# Patient Record
Sex: FEMALE | Race: WHITE | NOT HISPANIC OR LATINO | Employment: PART TIME | URBAN - METROPOLITAN AREA
[De-identification: names, ages, dates, MRNs, and addresses within clinical notes are randomized per-mention and may not be internally consistent; named-entity substitution may affect disease eponyms.]

---

## 2017-01-18 ENCOUNTER — APPOINTMENT (OUTPATIENT)
Dept: LAB | Facility: MEDICAL CENTER | Age: 37
End: 2017-01-18
Attending: NURSE PRACTITIONER
Payer: MEDICAID

## 2017-01-23 ENCOUNTER — HOSPITAL ENCOUNTER (OUTPATIENT)
Dept: LAB | Facility: MEDICAL CENTER | Age: 37
End: 2017-01-23
Attending: NURSE PRACTITIONER
Payer: MEDICAID

## 2017-01-23 LAB
ALBUMIN SERPL BCP-MCNC: 4.3 G/DL (ref 3.2–4.9)
ALBUMIN/GLOB SERPL: 1.3 G/DL
ALP SERPL-CCNC: 44 U/L (ref 30–99)
ALT SERPL-CCNC: 9 U/L (ref 2–50)
ANION GAP SERPL CALC-SCNC: 6 MMOL/L (ref 0–11.9)
AST SERPL-CCNC: 12 U/L (ref 12–45)
BASOPHILS # BLD AUTO: 0.03 K/UL (ref 0–0.12)
BASOPHILS NFR BLD AUTO: 0.6 % (ref 0–1.8)
BILIRUB SERPL-MCNC: 0.3 MG/DL (ref 0.1–1.5)
BUN SERPL-MCNC: 10 MG/DL (ref 8–22)
CALCIUM SERPL-MCNC: 9.1 MG/DL (ref 8.5–10.5)
CHLORIDE SERPL-SCNC: 107 MMOL/L (ref 96–112)
CHOLEST SERPL-MCNC: 222 MG/DL (ref 100–199)
CO2 SERPL-SCNC: 22 MMOL/L (ref 20–33)
CREAT SERPL-MCNC: 0.82 MG/DL (ref 0.5–1.4)
EOSINOPHIL # BLD: 0.07 K/UL (ref 0–0.51)
EOSINOPHIL NFR BLD AUTO: 1.3 % (ref 0–6.9)
ERYTHROCYTE [DISTWIDTH] IN BLOOD BY AUTOMATED COUNT: 44.8 FL (ref 35.9–50)
GLOBULIN SER CALC-MCNC: 3.2 G/DL (ref 1.9–3.5)
GLUCOSE SERPL-MCNC: 78 MG/DL (ref 65–99)
HCT VFR BLD AUTO: 40 % (ref 37–47)
HDLC SERPL-MCNC: 56 MG/DL
HGB BLD-MCNC: 12.7 G/DL (ref 12–16)
IMM GRANULOCYTES # BLD AUTO: 0.02 K/UL (ref 0–0.11)
IMM GRANULOCYTES NFR BLD AUTO: 0.4 % (ref 0–0.9)
LDLC SERPL CALC-MCNC: 127 MG/DL
LYMPHOCYTES # BLD: 1.78 K/UL (ref 1–4.8)
LYMPHOCYTES NFR BLD AUTO: 33 % (ref 22–41)
MCH RBC QN AUTO: 28.9 PG (ref 27–33)
MCHC RBC AUTO-ENTMCNC: 31.8 G/DL (ref 33.6–35)
MCV RBC AUTO: 90.9 FL (ref 81.4–97.8)
MONOCYTES # BLD: 0.57 K/UL (ref 0–0.85)
MONOCYTES NFR BLD AUTO: 10.6 % (ref 0–13.4)
NEUTROPHILS # BLD: 2.93 K/UL (ref 2–7.15)
NEUTROPHILS NFR BLD AUTO: 54.1 % (ref 44–72)
NRBC # BLD AUTO: 0 K/UL
NRBC BLD-RTO: 0 /100 WBC
PLATELET # BLD AUTO: 254 K/UL (ref 164–446)
PMV BLD AUTO: 10.8 FL (ref 9–12.9)
POTASSIUM SERPL-SCNC: 4.2 MMOL/L (ref 3.6–5.5)
PROT SERPL-MCNC: 7.5 G/DL (ref 6–8.2)
RBC # BLD AUTO: 4.4 M/UL (ref 4.2–5.4)
SODIUM SERPL-SCNC: 135 MMOL/L (ref 135–145)
T4 FREE SERPL-MCNC: 0.69 NG/DL (ref 0.53–1.43)
TRIGL SERPL-MCNC: 194 MG/DL (ref 0–149)
TSH SERPL DL<=0.005 MIU/L-ACNC: 1.43 UIU/ML (ref 0.3–3.7)
WBC # BLD AUTO: 5.4 K/UL (ref 4.8–10.8)

## 2017-01-23 PROCEDURE — 36415 COLL VENOUS BLD VENIPUNCTURE: CPT

## 2017-01-23 PROCEDURE — 80053 COMPREHEN METABOLIC PANEL: CPT

## 2017-01-23 PROCEDURE — 84443 ASSAY THYROID STIM HORMONE: CPT

## 2017-01-23 PROCEDURE — 85025 COMPLETE CBC W/AUTO DIFF WBC: CPT

## 2017-01-23 PROCEDURE — 80061 LIPID PANEL: CPT

## 2017-01-23 PROCEDURE — 84439 ASSAY OF FREE THYROXINE: CPT

## 2017-04-21 ENCOUNTER — HOSPITAL ENCOUNTER (OUTPATIENT)
Dept: LAB | Facility: MEDICAL CENTER | Age: 37
End: 2017-04-21
Payer: MEDICAID

## 2017-04-21 LAB
HBV SURFACE AG SER QL: NEGATIVE
HCV AB SER QL: NEGATIVE
HIV 1+2 AB+HIV1 P24 AG SERPL QL IA: NON REACTIVE
TREPONEMA PALLIDUM IGG+IGM AB [PRESENCE] IN SERUM OR PLASMA BY IMMUNOASSAY: NON REACTIVE

## 2017-04-21 PROCEDURE — 86803 HEPATITIS C AB TEST: CPT

## 2017-04-21 PROCEDURE — 87389 HIV-1 AG W/HIV-1&-2 AB AG IA: CPT

## 2017-04-21 PROCEDURE — 87340 HEPATITIS B SURFACE AG IA: CPT

## 2017-04-21 PROCEDURE — 86780 TREPONEMA PALLIDUM: CPT

## 2017-04-21 PROCEDURE — 86694 HERPES SIMPLEX NES ANTBDY: CPT | Mod: 91

## 2017-04-21 PROCEDURE — 36415 COLL VENOUS BLD VENIPUNCTURE: CPT

## 2017-04-24 LAB
HSV1+2 IGG SER IA-ACNC: >22.4 IV
HSV1+2 IGM SER IA-ACNC: 0.96 IV

## 2017-05-09 ENCOUNTER — HOSPITAL ENCOUNTER (EMERGENCY)
Facility: MEDICAL CENTER | Age: 37
End: 2017-05-09
Attending: EMERGENCY MEDICINE
Payer: MEDICAID

## 2017-05-09 VITALS
OXYGEN SATURATION: 99 % | BODY MASS INDEX: 19.56 KG/M2 | DIASTOLIC BLOOD PRESSURE: 67 MMHG | HEIGHT: 60 IN | SYSTOLIC BLOOD PRESSURE: 106 MMHG | RESPIRATION RATE: 18 BRPM | WEIGHT: 99.65 LBS | TEMPERATURE: 97.9 F | HEART RATE: 75 BPM

## 2017-05-09 DIAGNOSIS — N39.0 URINARY TRACT INFECTION WITH HEMATURIA, SITE UNSPECIFIED: ICD-10-CM

## 2017-05-09 DIAGNOSIS — R31.9 URINARY TRACT INFECTION WITH HEMATURIA, SITE UNSPECIFIED: ICD-10-CM

## 2017-05-09 LAB
ALBUMIN SERPL BCP-MCNC: 4.3 G/DL (ref 3.2–4.9)
ALBUMIN/GLOB SERPL: 1.7 G/DL
ALP SERPL-CCNC: 45 U/L (ref 30–99)
ALT SERPL-CCNC: 5 U/L (ref 2–50)
ANION GAP SERPL CALC-SCNC: 8 MMOL/L (ref 0–11.9)
APPEARANCE UR: CLEAR
AST SERPL-CCNC: 9 U/L (ref 12–45)
BASOPHILS # BLD AUTO: 0.5 % (ref 0–1.8)
BASOPHILS # BLD: 0.04 K/UL (ref 0–0.12)
BILIRUB SERPL-MCNC: 0.2 MG/DL (ref 0.1–1.5)
BUN SERPL-MCNC: 18 MG/DL (ref 8–22)
CALCIUM SERPL-MCNC: 9.4 MG/DL (ref 8.5–10.5)
CHLORIDE SERPL-SCNC: 105 MMOL/L (ref 96–112)
CO2 SERPL-SCNC: 23 MMOL/L (ref 20–33)
COLOR UR AUTO: YELLOW
CREAT SERPL-MCNC: 0.65 MG/DL (ref 0.5–1.4)
EOSINOPHIL # BLD AUTO: 0.07 K/UL (ref 0–0.51)
EOSINOPHIL NFR BLD: 0.9 % (ref 0–6.9)
ERYTHROCYTE [DISTWIDTH] IN BLOOD BY AUTOMATED COUNT: 43.7 FL (ref 35.9–50)
GFR SERPL CREATININE-BSD FRML MDRD: >60 ML/MIN/1.73 M 2
GLOBULIN SER CALC-MCNC: 2.6 G/DL (ref 1.9–3.5)
GLUCOSE SERPL-MCNC: 91 MG/DL (ref 65–99)
GLUCOSE UR QL STRIP.AUTO: 250 MG/DL
HCG SERPL QL: NEGATIVE
HCT VFR BLD AUTO: 38.4 % (ref 37–47)
HGB BLD-MCNC: 12.7 G/DL (ref 12–16)
IMM GRANULOCYTES # BLD AUTO: 0.02 K/UL (ref 0–0.11)
IMM GRANULOCYTES NFR BLD AUTO: 0.2 % (ref 0–0.9)
KETONES UR QL STRIP.AUTO: >=160 MG/DL
LEUKOCYTE ESTERASE UR QL STRIP.AUTO: ABNORMAL
LYMPHOCYTES # BLD AUTO: 2.07 K/UL (ref 1–4.8)
LYMPHOCYTES NFR BLD: 25.6 % (ref 22–41)
MCH RBC QN AUTO: 28.5 PG (ref 27–33)
MCHC RBC AUTO-ENTMCNC: 33.1 G/DL (ref 33.6–35)
MCV RBC AUTO: 86.3 FL (ref 81.4–97.8)
MONOCYTES # BLD AUTO: 0.3 K/UL (ref 0–0.85)
MONOCYTES NFR BLD AUTO: 3.7 % (ref 0–13.4)
NEUTROPHILS # BLD AUTO: 5.58 K/UL (ref 2–7.15)
NEUTROPHILS NFR BLD: 69.1 % (ref 44–72)
NITRITE UR QL STRIP.AUTO: POSITIVE
NRBC # BLD AUTO: 0 K/UL
NRBC BLD AUTO-RTO: 0 /100 WBC
PH UR STRIP.AUTO: 5 [PH]
PLATELET # BLD AUTO: 330 K/UL (ref 164–446)
PMV BLD AUTO: 9.9 FL (ref 9–12.9)
POTASSIUM SERPL-SCNC: 3 MMOL/L (ref 3.6–5.5)
PROT SERPL-MCNC: 6.9 G/DL (ref 6–8.2)
PROT UR QL STRIP: >=300 MG/DL
RBC # BLD AUTO: 4.45 M/UL (ref 4.2–5.4)
RBC UR QL AUTO: ABNORMAL
SODIUM SERPL-SCNC: 136 MMOL/L (ref 135–145)
SP GR UR: 1.02
WBC # BLD AUTO: 8.1 K/UL (ref 4.8–10.8)

## 2017-05-09 PROCEDURE — 700102 HCHG RX REV CODE 250 W/ 637 OVERRIDE(OP): Performed by: EMERGENCY MEDICINE

## 2017-05-09 PROCEDURE — 96365 THER/PROPH/DIAG IV INF INIT: CPT

## 2017-05-09 PROCEDURE — 84703 CHORIONIC GONADOTROPIN ASSAY: CPT

## 2017-05-09 PROCEDURE — 700105 HCHG RX REV CODE 258: Performed by: EMERGENCY MEDICINE

## 2017-05-09 PROCEDURE — 85025 COMPLETE CBC W/AUTO DIFF WBC: CPT

## 2017-05-09 PROCEDURE — 700111 HCHG RX REV CODE 636 W/ 250 OVERRIDE (IP): Performed by: EMERGENCY MEDICINE

## 2017-05-09 PROCEDURE — 36415 COLL VENOUS BLD VENIPUNCTURE: CPT

## 2017-05-09 PROCEDURE — 87086 URINE CULTURE/COLONY COUNT: CPT

## 2017-05-09 PROCEDURE — 81002 URINALYSIS NONAUTO W/O SCOPE: CPT

## 2017-05-09 PROCEDURE — 80053 COMPREHEN METABOLIC PANEL: CPT

## 2017-05-09 PROCEDURE — A9270 NON-COVERED ITEM OR SERVICE: HCPCS | Performed by: EMERGENCY MEDICINE

## 2017-05-09 PROCEDURE — 99284 EMERGENCY DEPT VISIT MOD MDM: CPT

## 2017-05-09 RX ORDER — OXYCODONE HYDROCHLORIDE AND ACETAMINOPHEN 5; 325 MG/1; MG/1
1 TABLET ORAL ONCE
Status: COMPLETED | OUTPATIENT
Start: 2017-05-09 | End: 2017-05-09

## 2017-05-09 RX ORDER — ONDANSETRON 4 MG/1
4 TABLET, FILM COATED ORAL EVERY 4 HOURS PRN
Qty: 20 TAB | Refills: 0 | Status: SHIPPED | OUTPATIENT
Start: 2017-05-09 | End: 2017-08-03

## 2017-05-09 RX ORDER — SULFAMETHOXAZOLE AND TRIMETHOPRIM 800; 160 MG/1; MG/1
1 TABLET ORAL 2 TIMES DAILY
Qty: 6 TAB | Refills: 0 | Status: SHIPPED | OUTPATIENT
Start: 2017-05-09 | End: 2017-05-12

## 2017-05-09 RX ORDER — CEFTRIAXONE 1 G/1
1 INJECTION, POWDER, FOR SOLUTION INTRAMUSCULAR; INTRAVENOUS ONCE
Status: COMPLETED | OUTPATIENT
Start: 2017-05-09 | End: 2017-05-09

## 2017-05-09 RX ORDER — ONDANSETRON 4 MG/1
4 TABLET, ORALLY DISINTEGRATING ORAL ONCE
Status: COMPLETED | OUTPATIENT
Start: 2017-05-09 | End: 2017-05-09

## 2017-05-09 RX ORDER — SODIUM CHLORIDE 9 MG/ML
1000 INJECTION, SOLUTION INTRAVENOUS ONCE
Status: COMPLETED | OUTPATIENT
Start: 2017-05-09 | End: 2017-05-09

## 2017-05-09 RX ORDER — OXYCODONE HYDROCHLORIDE AND ACETAMINOPHEN 5; 325 MG/1; MG/1
1-2 TABLET ORAL EVERY 4 HOURS PRN
Qty: 20 TAB | Refills: 0 | Status: SHIPPED | OUTPATIENT
Start: 2017-05-09 | End: 2017-08-03

## 2017-05-09 RX ADMIN — ONDANSETRON 4 MG: 4 TABLET, ORALLY DISINTEGRATING ORAL at 13:57

## 2017-05-09 RX ADMIN — OXYCODONE HYDROCHLORIDE AND ACETAMINOPHEN 1 TABLET: 5; 325 TABLET ORAL at 13:57

## 2017-05-09 RX ADMIN — CEFTRIAXONE 1 G: 1 INJECTION, POWDER, FOR SOLUTION INTRAMUSCULAR; INTRAVENOUS at 14:19

## 2017-05-09 RX ADMIN — SODIUM CHLORIDE 1000 ML: 9 INJECTION, SOLUTION INTRAVENOUS at 14:19

## 2017-05-09 NOTE — ED AVS SNAPSHOT
5/9/2017    Danielle Bacrenas  [address]  [place] [address]    Dear Danielle:    [facility] wants to ensure your discharge home is safe and you or your loved ones have had all of your questions answered regarding your care after you leave the hospital.    Below is a list of resources and contact information should you have any questions regarding your hospital stay, follow-up instructions, or active medical symptoms.    Questions or Concerns Regarding… Contact   Medical Questions Related to Your Discharge  (7 days a week, 8am-5pm) Contact a Nurse Care Coordinator   [phone]   Medical Questions Not Related to Your Discharge  (24 hours a day / 7 days a week)  Contact the Nurse Health Line   [phone]    Medications or Discharge Instructions Refer to your discharge packet   or contact your [facility] Primary Care Provider   [phone]   Follow-up Appointment(s) Schedule your appointment via Corebook   or contact Scheduling [phone]   Billing Review your statement via Corebook  or contact Billing [phone]   Medical Records Review your records via Corebook   or contact Medical Records [phone]     You may receive a telephone call within two days of discharge. This call is to make certain you understand your discharge instructions and have the opportunity to have any questions answered. You can also easily access your medical information, test results and upcoming appointments via the Corebook free online health management tool. You can learn more and sign up at [employer]/Corebook. For assistance setting up your Corebook account, please call [phone].    Once again, we want to ensure your discharge home is safe and that you have a clear understanding of any next steps in your care. If you have any questions or concerns, please do not hesitate to contact us, we are here for you. Thank you for choosing [facility] for your healthcare needs.    Sincerely,    Your [facility] Healthcare Team

## 2017-05-09 NOTE — ED AVS SNAPSHOT
Ungalli Access Code: [email or web address]  Expires: 6/8/2017  3:31 PM    Ungalli  A secure, online tool to manage your health information     [employer] Ungalli® is a secure, online tool that connects you to your personalized health information from the privacy of your home -- day or night - making it very easy for you to manage your healthcare. Once the activation process is completed, you can even access your medical information using the Ungalli john, which is available for free in the Apple John store or Google Play store.     Ungalli provides the following levels of access (as shown below):   My Chart Features   [facility] Primary Care Doctor [facility]  Specialists [facility]  Urgent  Care Non-[facility]  Primary Care  Doctor   Email your healthcare team securely and privately 24/7 X X X X   Manage appointments: schedule your next appointment; view details of past/upcoming appointments X      Request prescription refills. X      View recent personal medical records, including lab and immunizations X X X X   View health record, including health history, allergies, medications X X X X   Read reports about your outpatient visits, procedures, consult and ER notes X X X X   See your discharge summary, which is a recap of your hospital and/or ER visit that includes your diagnosis, lab results, and care plan. X X       How to register for Ungalli:  1. Go to  [email or web address].  2. Click on the Sign Up Now box, which takes you to the New Member Sign Up page. You will need to provide the following information:  a. Enter your Ungalli Access Code exactly as it appears at the top of this page. (You will not need to use this code after you’ve completed the sign-up process. If you do not sign up before the expiration date, you must request a new code.)   b. Enter your date of birth.   c. Enter your home email address.   d. Click Submit, and follow the next screen’s instructions.  3. Create a Ungalli ID. This will be your Ungalli  login ID and cannot be changed, so think of one that is secure and easy to remember.  4. Create a [employer] password. You can change your password at any time.  5. Enter your Password Reset Question and Answer. This can be used at a later time if you forget your password.   6. Enter your e-mail address. This allows you to receive e-mail notifications when new information is available in [employer].  7. Click Sign Up. You can now view your health information.    For assistance activating your [employer] account, call [phone]

## 2017-05-09 NOTE — DISCHARGE INSTRUCTIONS
Urinary Tract Infection  Urinary tract infections (UTIs) can develop anywhere along your urinary tract. Your urinary tract is your body's drainage system for removing wastes and extra water. Your urinary tract includes two kidneys, two ureters, a bladder, and a urethra. Your kidneys are a pair of bean-shaped organs. Each kidney is about the size of your fist. They are located below your ribs, one on each side of your spine.  CAUSES  Infections are caused by microbes, which are microscopic organisms, including fungi, viruses, and bacteria. These organisms are so small that they can only be seen through a microscope. Bacteria are the microbes that most commonly cause UTIs.  SYMPTOMS   Symptoms of UTIs may vary by age and gender of the patient and by the location of the infection. Symptoms in young women typically include a frequent and intense urge to urinate and a painful, burning feeling in the bladder or urethra during urination. Older women and men are more likely to be tired, shaky, and weak and have muscle aches and abdominal pain. A fever may mean the infection is in your kidneys. Other symptoms of a kidney infection include pain in your back or sides below the ribs, nausea, and vomiting.  DIAGNOSIS  To diagnose a UTI, your caregiver will ask you about your symptoms. Your caregiver also will ask to provide a urine sample. The urine sample will be tested for bacteria and white blood cells. White blood cells are made by your body to help fight infection.  TREATMENT   Typically, UTIs can be treated with medication. Because most UTIs are caused by a bacterial infection, they usually can be treated with the use of antibiotics. The choice of antibiotic and length of treatment depend on your symptoms and the type of bacteria causing your infection.  HOME CARE INSTRUCTIONS  · If you were prescribed antibiotics, take them exactly as your caregiver instructs you. Finish the medication even if you feel better after you  have only taken some of the medication.  · Drink enough water and fluids to keep your urine clear or pale yellow.  · Avoid caffeine, tea, and carbonated beverages. They tend to irritate your bladder.  · Empty your bladder often. Avoid holding urine for long periods of time.  · Empty your bladder before and after sexual intercourse.  · After a bowel movement, women should cleanse from front to back. Use each tissue only once.  SEEK MEDICAL CARE IF:   · You have back pain.  · You develop a fever.  · Your symptoms do not begin to resolve within 3 days.  SEEK IMMEDIATE MEDICAL CARE IF:   · You have severe back pain or lower abdominal pain.  · You develop chills.  · You have nausea or vomiting.  · You have continued burning or discomfort with urination.  MAKE SURE YOU:   · Understand these instructions.  · Will watch your condition.  · Will get help right away if you are not doing well or get worse.     This information is not intended to replace advice given to you by your health care provider. Make sure you discuss any questions you have with your health care provider.     Document Released: 09/27/2006 Document Revised: 01/08/2016 Document Reviewed: 01/25/2013  [employer] Interactive Patient Education ©2016 [employer]

## 2017-05-09 NOTE — ED NOTES
"Pt to triage, c/o \" painful urination \" \" hurts only when I urinate \" , pt states \" I think I have a UIT\" , \" tried OTC azos\" , pt provided urine cup and clean catch instructions in triage   "

## 2017-05-09 NOTE — ED AVS SNAPSHOT
Home Care Instructions                                                                                                                Danielle Barcenas   MRN: [id number]    Department:  [facility], Emergency Dept   Date of Visit:  5/9/2017            [facility], Emergency Dept    [address] [place] [id number]    Phone:  [phone]      You were seen by     Paulo Cornell M.D.      Your Diagnosis Was     Urinary tract infection with hematuria, site unspecified     N39.0, R31.9       These are the medications you received during your hospitalization from 05/09/2017 1237 to 05/09/2017 1532     Date/Time Order Dose Route Action    05/09/2017 1357 oxycodone-acetaminophen (PERCOCET) 5-325 MG per tablet 1 Tab 1 Tab Oral Given    05/09/2017 1357 ondansetron (ZOFRAN ODT) dispertab 4 mg 4 mg Oral Given    05/09/2017 1419 NS infusion 1,000 mL 1,000 mL Intravenous New Bag    05/09/2017 1419 cefTRIAXone (ROCEPHIN) injection 1 g 1 g Intravenous Given      Follow-up Information     1. Follow up with [facility].    Why:  call for follow up to establish a primary care doctor    Contact information    [address] [place] [address] [phone]        2. Follow up with [facility], Emergency Dept.    Specialty:  Emergency Medicine    Why:  If symptoms worsen    Contact information    [address] [place] [id number] [phone]      Medication Information     Review all of your home medications and newly ordered medications with your primary doctor and/or pharmacist as soon as possible. Follow medication instructions as directed by your doctor and/or pharmacist.     Please keep your complete medication list with you and share with your physician. Update the information when medications are discontinued, doses are changed, or new medications (including over-the-counter products) are added; and carry medication information at all  times in the event of emergency situations.               Medication List      START taking these medications        Instructions    Morning Afternoon Evening Bedtime    ondansetron 4 MG Tabs tablet   Commonly known as:  ZOFRAN        Take 1 Tab by mouth every four hours as needed for Nausea/Vomiting.   Dose:  4 mg                        sulfamethoxazole-trimethoprim 800-160 MG tablet   Commonly known as:  BACTRIM DS        Take 1 Tab by mouth 2 times a day for 3 days.   Dose:  1 Tab                          ASK your doctor about these medications        Instructions    Morning Afternoon Evening Bedtime    alprazolam 0.5 MG Tabs   Commonly known as:  XANAX        Take 1 Tab by mouth at bedtime as needed for Sleep.   Dose:  0.5 mg                        cyclobenzaprine 10 MG Tabs   Commonly known as:  FLEXERIL        Take 1 Tab by mouth 3 times a day as needed for Moderate Pain.   Dose:  10 mg                        escitalopram 10 MG Tabs   Commonly known as:  LEXAPRO        Take 1 Tab by mouth every day.   Dose:  10 mg                        * NUVARING VA        Insert  in vagina.                        * ethinyl estradiol-etonogestrel 0.12-0.015 MG/24HR vaginal ring   Commonly known as:  NUVARING        Use as directed                        hydrALAZINE 10 MG Tabs   Commonly known as:  APRESOLINE        Take 10 mg by mouth 3 times a day.   Dose:  10 mg                        * hydrocodone-acetaminophen 5-325 MG Tabs per tablet   Commonly known as:  NORCO        Take 1-2 Tabs by mouth every four hours as needed.   Dose:  1-2 Tab                        * hydrocodone-acetaminophen 5-325 MG Tabs per tablet   Commonly known as:  NORCO        Take 1-2 Tabs by mouth every 6 hours as needed.   Dose:  1-2 Tab                        * hydrocodone/acetaminophen  MG Tabs   Commonly known as:  NORCO        Doctor's comments:  For new neck injuryTo last 30 days, no refills   Take 1 Tab by mouth every 8 hours as needed.      Dose:  1 Tab                        * hydrocodone-acetaminophen 5-325 MG Tabs per tablet   Commonly known as:  NORCO        Take 1 Tab by mouth every four hours as needed.   Dose:  1 Tab                        * hydrocodone/acetaminophen  MG Tabs   Commonly known as:  NORCO        Doctor's comments:  No refills.   Take 1 Tab by mouth every 24 hours as needed for Moderate Pain or Severe Pain.   Dose:  1 Tab                        hydrOXYzine 25 MG Tabs   Commonly known as:  ATARAX        Take 1 Tab by mouth at bedtime as needed for Anxiety (sleep).   Dose:  25 mg                        * ibuprofen 800 MG Tabs   Commonly known as:  MOTRIN        Take 0.5 Tabs by mouth every 8 hours as needed.   Dose:  400 mg                        * ibuprofen 800 MG Tabs   Commonly known as:  MOTRIN        Take 1 Tab by mouth every 8 hours as needed.   Dose:  800 mg                        lorazepam 1 MG Tabs   Commonly known as:  ATIVAN        Take 1 mg by mouth every four hours as needed for Anxiety.   Dose:  1 mg                        methocarbamol 500 MG Tabs   Commonly known as:  ROBAXIN        Take 1 Tab by mouth 3 times a day.   Dose:  500 mg                        * oxycodone-acetaminophen 5-325 MG Tabs   What changed:  Another medication with the same name was added. Make sure you understand how and when to take each.   Last time this was given:  1 Tab on 5/9/2017  1:57 PM   Commonly known as:  PERCOCET   Ask about: Which instructions should I use?        Take 1-2 Tabs by mouth every four hours as needed.   Dose:  1-2 Tab                        * oxycodone-acetaminophen 5-325 MG Tabs   What changed:  You were already taking a medication with the same name, and this prescription was added. Make sure you understand how and when to take each.   Last time this was given:  1 Tab on 5/9/2017  1:57 PM   Commonly known as:  PERCOCET   Ask about: Which instructions should I use?        Take 1-2 Tabs by mouth every four hours  as needed.   Dose:  1-2 Tab                        paroxetine 20 MG Tabs   Commonly known as:  PAXIL        Take 20 mg by mouth every day.   Dose:  20 mg                        propranolol 20 MG Tabs   Commonly known as:  INDERAL        Take 20 mg by mouth 2 times a day.   Dose:  20 mg                        TOPAMAX 50 MG tablet   Generic drug:  topiramate        Take 50 mg by mouth every day.   Dose:  50 mg                        tramadol 50 MG Tabs   Commonly known as:  ULTRAM        Take 1 Tab by mouth every four hours as needed.   Dose:  50 mg                        * Notice:  This list has 11 medication(s) that are the same as other medications prescribed for you. Read the directions carefully, and ask your doctor or other care provider to review them with you.         Where to Get Your Medications      You can get these medications from any pharmacy     Bring a paper prescription for each of these medications    - ondansetron 4 MG Tabs tablet  - oxycodone-acetaminophen 5-325 MG Tabs  - sulfamethoxazole-trimethoprim 800-160 MG tablet            Procedures and tests performed during your visit     CBC WITH DIFFERENTIAL    COMP METABOLIC PANEL    ESTIMATED GFR    HCG QUAL SERUM    IV Saline Lock    POC UA    URINE CULTURE(NEW)        Discharge Instructions       Urinary Tract Infection  Urinary tract infections (UTIs) can develop anywhere along your urinary tract. Your urinary tract is your body's drainage system for removing wastes and extra water. Your urinary tract includes two kidneys, two ureters, a bladder, and a urethra. Your kidneys are a pair of bean-shaped organs. Each kidney is about the size of your fist. They are located below your ribs, one on each side of your spine.  CAUSES  Infections are caused by microbes, which are microscopic organisms, including fungi, viruses, and bacteria. These organisms are so small that they can only be seen through a microscope. Bacteria are the microbes that most  commonly cause UTIs.  SYMPTOMS   Symptoms of UTIs may vary by age and gender of the patient and by the location of the infection. Symptoms in young women typically include a frequent and intense urge to urinate and a painful, burning feeling in the bladder or urethra during urination. Older women and men are more likely to be tired, shaky, and weak and have muscle aches and abdominal pain. A fever may mean the infection is in your kidneys. Other symptoms of a kidney infection include pain in your back or sides below the ribs, nausea, and vomiting.  DIAGNOSIS  To diagnose a UTI, your caregiver will ask you about your symptoms. Your caregiver also will ask to provide a urine sample. The urine sample will be tested for bacteria and white blood cells. White blood cells are made by your body to help fight infection.  TREATMENT   Typically, UTIs can be treated with medication. Because most UTIs are caused by a bacterial infection, they usually can be treated with the use of antibiotics. The choice of antibiotic and length of treatment depend on your symptoms and the type of bacteria causing your infection.  HOME CARE INSTRUCTIONS  · If you were prescribed antibiotics, take them exactly as your caregiver instructs you. Finish the medication even if you feel better after you have only taken some of the medication.  · Drink enough water and fluids to keep your urine clear or pale yellow.  · Avoid caffeine, tea, and carbonated beverages. They tend to irritate your bladder.  · Empty your bladder often. Avoid holding urine for long periods of time.  · Empty your bladder before and after sexual intercourse.  · After a bowel movement, women should cleanse from front to back. Use each tissue only once.  SEEK MEDICAL CARE IF:   · You have back pain.  · You develop a fever.  · Your symptoms do not begin to resolve within 3 days.  SEEK IMMEDIATE MEDICAL CARE IF:   · You have severe back pain or lower abdominal pain.  · You develop  chills.  · You have nausea or vomiting.  · You have continued burning or discomfort with urination.  MAKE SURE YOU:   · Understand these instructions.  · Will watch your condition.  · Will get help right away if you are not doing well or get worse.     This information is not intended to replace advice given to you by your health care provider. Make sure you discuss any questions you have with your health care provider.     Document Released: 09/27/2006 Document Revised: 01/08/2016 Document Reviewed: 01/25/2013  Elsevier Interactive Patient Education ©2016 [employer]            Patient Information     Patient Information    Following emergency treatment: all patient requiring follow-up care must return either to a private physician or a clinic if your condition worsens before you are able to obtain further medical attention, please return to the emergency room.     Billing Information    At [facility], we work to make the billing process streamlined for our patients.  Our Representatives are here to answer any questions you may have regarding your hospital bill.  If you have insurance coverage and have supplied your insurance information to us, we will submit a claim to your insurer on your behalf.  Should you have any questions regarding your bill, we can be reached online or by phone as follows:  Online: You are able pay your bills online or live chat with our representatives about any billing questions you may have. We are here to help Monday - Friday from 8:00am to 7:30pm and 9:00am - 12:00pm on Saturdays.  Please visit [email or web address]  for more information.   Phone:  [phone] or [phone]    Please note that your emergency physician, surgeon, pathologist, radiologist, anesthesiologist, and other specialists are not employed by [facility] and will therefore bill separately for their services.  Please contact them directly for any questions concerning their bills  at the numbers below:     Emergency Physician Services:  [phone]  [facility]:  [phone]  [facility]:  [phone]  [facility]:  [phone]    1. Your final bill may vary from the amount quoted upon discharge if all procedures are not complete at that time, or if your doctor has additional procedures of which we are not aware. You will receive an additional bill if you return to the Emergency Department at [facility] for suture removal regardless of the facility of which the sutures were placed.     2. Please arrange for settlement of this account at the emergency registration.    3. All self-pay accounts are due in full at the time of treatment.  If you are unable to meet this obligation then payment is expected within 4-5 days.     4. If you have had radiology studies (CT, X-ray, Ultrasound, MRI), you have received a preliminary result during your emergency department visit. Please contact the radiology department [phone] to receive a copy of your final result. Please discuss the Final result with your primary physician or with the follow up physician provided.     Crisis Hotline:  [place] Crisis Hotline:  0-455-FXVDTPV or 1-431.745.2926  [place] Crisis Hotline:    1-911.745.3430 or 777-494-4406         ED Discharge Follow Up Questions    1. In order to provide you with very good care, we would like to follow up with a phone call in the next few days.  May we have your permission to contact you?     YES /  NO    2. What is the best phone number to call you? (       )_____-__________    3. What is the best time to call you?      Morning  /  Afternoon  /  Evening                   Patient Signature:  ____________________________________________________________    Date:  ____________________________________________________________

## 2017-05-09 NOTE — ED PROVIDER NOTES
ED Provider Note    Scribed for Paulo Cornell M.D. by Ck Jimenez. 5/9/2017, 1:46 PM.    Primary care provider: Pcp Pt States None  Means of arrival: Walk-in  History obtained from: Patient  History limited by: None    CHIEF COMPLAINT  Chief Complaint   Patient presents with   • Difficulty Urinating       HPI  Danielle Barcenas is a 36 y.o. female who presents to the Emergency Department with difficulty urinating over the past three weeks.  The patient states that she has been looking for a primary care physician, but has not been able to be seen.  She has had associated fevers, abdominal pain, back pain, nausea and vomiting but no headaches.  The patient has never had any type of urinary infection.  The pain is localized, and does not radiate.  She notes that pregnancy is possible, but has not taken any pregnancy tests recently.  The patient's last menstrual period was two weeks ago which she reports was normal.  Since the event she has participated in sexual intercourse.  She has been taking AZO at home but has had very little relief of her symptoms.    REVIEW OF SYSTEMS  Pertinent positives include difficulty urinating, fevers, abdominal pain, back pain, nausea, vomiting. Pertinent negatives include no headaches. As above, all other systems reviewed and are negative.   See HPI for further details.     PAST MEDICAL HISTORY   No significant past medical history    SURGICAL HISTORY   has past surgical history that includes wrist orif (Left); gyn surgery; and other orthopedic surgery.    SOCIAL HISTORY  Social History   Substance Use Topics   • Smoking status: Never Smoker    • Alcohol Use: No      History   Drug Use No       FAMILY HISTORY  Family History   Problem Relation Age of Onset   • Stroke Maternal Grandfather        CURRENT MEDICATIONS  No current facility-administered medications on file prior to encounter.     Current Outpatient Prescriptions on File Prior to Encounter   Medication Sig  Dispense Refill   • propranolol (INDERAL) 20 MG Tab Take 20 mg by mouth 2 times a day.     • topiramate (TOPAMAX) 50 MG tablet Take 50 mg by mouth every day.     • paroxetine (PAXIL) 20 MG Tab Take 20 mg by mouth every day.     • hydrocodone/acetaminophen (NORCO)  MG Tab Take 1 Tab by mouth every 24 hours as needed for Moderate Pain or Severe Pain. 30 Tab 0   • lorazepam (ATIVAN) 1 MG Tab Take 1 mg by mouth every four hours as needed for Anxiety.     • hydrocodone-acetaminophen (NORCO) 5-325 MG Tab per tablet Take 1-2 Tabs by mouth every four hours as needed.     • hydrALAZINE (APRESOLINE) 10 MG Tab Take 10 mg by mouth 3 times a day.     • Etonogestrel-Ethinyl Estradiol (NUVARING VA) Insert  in vagina.     • cyclobenzaprine (FLEXERIL) 10 MG Tab Take 1 Tab by mouth 3 times a day as needed for Moderate Pain. 20 Tab 0   • ibuprofen (MOTRIN) 800 MG Tab Take 1 Tab by mouth every 8 hours as needed. 30 Tab 3   • hydrocodone-acetaminophen (NORCO) 5-325 MG Tab per tablet Take 1 Tab by mouth every four hours as needed. 6 Tab 0   • methocarbamol (ROBAXIN) 500 MG Tab Take 1 Tab by mouth 3 times a day. 90 Tab 1   • ibuprofen (MOTRIN) 800 MG Tab Take 0.5 Tabs by mouth every 8 hours as needed. 30 Tab 3   • hydrocodone/acetaminophen (NORCO)  MG Tab Take 1 Tab by mouth every 8 hours as needed. 45 Tab 0   • oxycodone-acetaminophen (PERCOCET) 5-325 MG Tab Take 1-2 Tabs by mouth every four hours as needed. 20 Tab 0   • hydrOXYzine (ATARAX) 25 MG Tab Take 1 Tab by mouth at bedtime as needed for Anxiety (sleep). 30 Tab 1   • alprazolam (XANAX) 0.5 MG Tab Take 1 Tab by mouth at bedtime as needed for Sleep. 12 Tab 0   • escitalopram (LEXAPRO) 10 MG Tab Take 1 Tab by mouth every day. 30 Tab 1   • ethinyl estradiol-etonogestrel (NUVARING) 0.12-0.015 MG/24HR vaginal ring Use as directed 1 Each 1   • tramadol (ULTRAM) 50 MG Tab Take 1 Tab by mouth every four hours as needed. 30 Tab 0   • hydrocodone-acetaminophen (NORCO) 5-325 MG  Tab per tablet Take 1-2 Tabs by mouth every 6 hours as needed. 30 Tab 0       ALLERGIES  No Known Allergies    PHYSICAL EXAM  VITAL SIGNS: /67 mmHg  Pulse 87  Temp(Src) 36.6 °C (97.9 °F) (Temporal)  Resp 18  Ht 1.524 m (5')  Wt 45.2 kg (99 lb 10.4 oz)  BMI 19.46 kg/m2  SpO2 95%  Vitals reviewed.  Constitutional: Alert in no apparent distress.  HENT: No signs of trauma, Bilateral external ears normal, Nose normal.   Eyes: Pupils are equal and reactive, Conjunctiva normal, Non-icteric.   Neck: Normal range of motion, No tenderness, Supple, No stridor.   Lymphatic: No lymphadenopathy noted.   Cardiovascular: Regular rate and rhythm, no murmurs.   Thorax & Lungs: Normal breath sounds, No respiratory distress, No wheezing, No chest tenderness.   Abdomen: Bowel sounds normal, Soft, No tenderness, No peritoneal signs, No masses, No pulsatile masses.   Skin: Warm, Dry, No erythema, No rash.   Back: No bony tenderness, No CVA tenderness.   Extremities: Intact distal pulses, No edema, No tenderness, No cyanosis  Musculoskeletal: Good range of motion in all major joints. No tenderness to palpation or major deformities noted.   Neurologic: Alert , Normal motor function, Normal sensory function, No focal deficits noted.   Psychiatric: Affect normal, Judgment normal, Mood normal.     DIAGNOSTIC STUDIES / PROCEDURES    LABS  Labs Reviewed   CBC WITH DIFFERENTIAL - Abnormal; Notable for the following:     MCHC 33.1 (*)     All other components within normal limits   COMP METABOLIC PANEL - Abnormal; Notable for the following:     Potassium 3.0 (*)     AST(SGOT) 9 (*)     All other components within normal limits   POC UA - Abnormal; Notable for the following:     POC Glucose 250 (*)     POC Ketones >=160 (*)     POC Blood Trace-intact (*)     POC Protein >=300 (*)     POC Nitrites Positive (*)     POC Leukocyte Esterase Large (*)     All other components within normal limits   URINE CULTURE(NEW)    Narrative:      Indication for culture:->Emergency Room Patient   HCG QUAL SERUM   ESTIMATED GFR   POC URINALYSIS      All labs reviewed by me.    COURSE & MEDICAL DECISION MAKING  Nursing notes, VS, PMSFHx reviewed in chart.  Differential diagnoses include but not limited to: pregnancy, pylonephritis, ectopic pregnancy, intrauterine pregnancy, dehydration     1:46 PM Patient seen and examined at bedside. Ordered for a Urine Culture, POC UA, POC Urinalysis to evaluate. Patient will be treated with Percocet 5-325 mg 1 tab, Zofran 4 mg for her symptoms.      2:03 PM - The test on her urine was not accurate due to the AZO she has been taking.  We will perform a blood draw at this time.  Ordered NS 1000 mL, Rocephin 1 g,  CBC with differential, CMP, HCG Qual Serum    The patient will return for new or worsening symptoms and is stable at the time of discharge.      DISPOSITION:  Patient will be discharged home in stable condition.    FOLLOW UP:  [address] [id number] [phone]    call for follow up to establish a primary care doctor    [facility], Emergency Dept  [address] [facility] [address] [phone]    If symptoms worsen      OUTPATIENT MEDICATIONS:  New Prescriptions    ONDANSETRON (ZOFRAN) 4 MG TAB TABLET    Take 1 Tab by mouth every four hours as needed for Nausea/Vomiting.    OXYCODONE-ACETAMINOPHEN (PERCOCET) 5-325 MG TAB    Take 1-2 Tabs by mouth every four hours as needed.    SULFAMETHOXAZOLE-TRIMETHOPRIM (BACTRIM DS) 800-160 MG TABLET    Take 1 Tab by mouth 2 times a day for 3 days.           FINAL IMPRESSION  1. Urinary tract infection with hematuria, site unspecified          Ck BARBOZA (Eugenio), am scribing for, and in the presence of, Paulo Cornell M.D..    Electronically signed by: Ck Wakefield), 5/9/2017    Paulo BARBOZA M.D. personally performed the services described in this documentation, as scribed by  Ck Jimenez in my presence, and it is both accurate and complete.    The note accurately reflects work and decisions made by me.  Paulo Cornell  5/9/2017  3:32 PM

## 2017-05-11 LAB
BACTERIA UR CULT: NORMAL
SIGNIFICANT IND 70042: NORMAL
SITE SITE: NORMAL
SOURCE SOURCE: NORMAL

## 2017-07-19 ENCOUNTER — HOSPITAL ENCOUNTER (EMERGENCY)
Dept: HOSPITAL 8 - ED | Age: 37
Discharge: HOME | End: 2017-07-19
Payer: MEDICAID

## 2017-07-19 VITALS — SYSTOLIC BLOOD PRESSURE: 103 MMHG | DIASTOLIC BLOOD PRESSURE: 66 MMHG

## 2017-07-19 VITALS — WEIGHT: 94.8 LBS | HEIGHT: 60 IN | BODY MASS INDEX: 18.61 KG/M2
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
## 2017-07-19 DIAGNOSIS — S60.212A: Primary | ICD-10-CM
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
## 2017-07-19 PROCEDURE — 99284 EMERGENCY DEPT VISIT MOD MDM: CPT

## 2017-08-03 ENCOUNTER — OFFICE VISIT (OUTPATIENT)
Dept: MEDICAL GROUP | Facility: MEDICAL CENTER | Age: 37
End: 2017-08-03
Attending: NURSE PRACTITIONER
Payer: MEDICAID

## 2017-08-03 VITALS
WEIGHT: 93 LBS | HEIGHT: 60 IN | HEART RATE: 82 BPM | RESPIRATION RATE: 20 BRPM | TEMPERATURE: 98.1 F | OXYGEN SATURATION: 97 % | BODY MASS INDEX: 18.26 KG/M2

## 2017-08-03 DIAGNOSIS — Z78.9 HISTORY OF MEASLES, MUMPS, RUBELLA (MMR) VACCINATION UNKNOWN: ICD-10-CM

## 2017-08-03 DIAGNOSIS — Z23 NEED FOR TDAP VACCINATION: ICD-10-CM

## 2017-08-03 DIAGNOSIS — M25.532 CHRONIC PAIN OF LEFT WRIST: ICD-10-CM

## 2017-08-03 DIAGNOSIS — F99 PSYCHIATRIC DISTURBANCE: ICD-10-CM

## 2017-08-03 DIAGNOSIS — G89.29 CHRONIC PAIN OF LEFT WRIST: ICD-10-CM

## 2017-08-03 DIAGNOSIS — Z00.00 ROUTINE HEALTH MAINTENANCE: ICD-10-CM

## 2017-08-03 PROCEDURE — 90715 TDAP VACCINE 7 YRS/> IM: CPT | Performed by: NURSE PRACTITIONER

## 2017-08-03 PROCEDURE — 90471 IMMUNIZATION ADMIN: CPT | Performed by: NURSE PRACTITIONER

## 2017-08-03 PROCEDURE — 99214 OFFICE O/P EST MOD 30 MIN: CPT | Mod: 25 | Performed by: NURSE PRACTITIONER

## 2017-08-03 PROCEDURE — 99212 OFFICE O/P EST SF 10 MIN: CPT | Mod: 25 | Performed by: NURSE PRACTITIONER

## 2017-08-03 RX ORDER — CLONAZEPAM 0.5 MG/1
0.25 TABLET ORAL 2 TIMES DAILY
COMMUNITY
End: 2017-08-03 | Stop reason: SDUPTHER

## 2017-08-03 RX ORDER — PAROXETINE HYDROCHLORIDE 20 MG/1
20 TABLET, FILM COATED ORAL DAILY
Qty: 30 TAB | Refills: 0 | Status: SHIPPED | OUTPATIENT
Start: 2017-08-03 | End: 2018-09-24 | Stop reason: SDUPTHER

## 2017-08-03 RX ORDER — CLONAZEPAM 0.5 MG/1
0.25 TABLET ORAL 2 TIMES DAILY
Qty: 60 TAB | Refills: 0 | Status: SHIPPED | OUTPATIENT
Start: 2017-08-03 | End: 2019-11-12

## 2017-08-03 RX ORDER — QUETIAPINE FUMARATE 25 MG/1
25 TABLET, FILM COATED ORAL 2 TIMES DAILY
COMMUNITY
End: 2017-08-03 | Stop reason: SDUPTHER

## 2017-08-03 RX ORDER — QUETIAPINE FUMARATE 25 MG/1
25 TABLET, FILM COATED ORAL 2 TIMES DAILY
Qty: 60 TAB | Refills: 0 | Status: SHIPPED | OUTPATIENT
Start: 2017-08-03 | End: 2018-09-24 | Stop reason: SDUPTHER

## 2017-08-03 RX ORDER — TOPIRAMATE 50 MG/1
50 TABLET, FILM COATED ORAL DAILY
Qty: 60 TAB | Refills: 0 | Status: SHIPPED | OUTPATIENT
Start: 2017-08-03 | End: 2018-09-24 | Stop reason: SDUPTHER

## 2017-08-03 ASSESSMENT — PATIENT HEALTH QUESTIONNAIRE - PHQ9: CLINICAL INTERPRETATION OF PHQ2 SCORE: 0

## 2017-08-03 NOTE — MR AVS SNAPSHOT
Danielle Pulliam Narinder   8/3/2017 3:10 PM   Office Visit   MRN: [id number]    [facility]   Dept Phone:  [phone]    Description:  Female : 1980   Provider:  SILVINO Valladares           Reason for Visit     Medication Refill     Anxiety     Depression     Difficulty Sleeping           Allergies as of 8/3/2017     No Known Allergies      You were diagnosed with     Psychiatric disturbance   [739016]       History of measles, mumps, rubella (MMR) vaccination unknown   [8655332]       Need for Tdap vaccination   [469470]       Routine health maintenance   [904306]       Chronic pain of left wrist   [8489943]         Vital Signs     Pulse Temperature Respirations Height Weight Body Mass Index    82 36.7 °C (98.1 °F) 20 1.524 m (5') 42.185 kg (93 lb) 18.16 kg/m2    Oxygen Saturation Last Menstrual Period Breastfeeding? Smoking Status          97% 2017 No Never Smoker         Basic Information     Date Of Birth Sex Race Ethnicity Preferred Language    1980 Female White Non- English      Problem List              ICD-10-CM Priority Class Noted - Resolved    Routine health maintenance Z00.00   2016 - Present    Anxiety F41.9   2016 - Present    Encounter for birth control Z30.9   2016 - Present    Chronic pain of left wrist M25.532, G89.29   2016 - Present    Neck pain, acute M54.2   2016 - Present    Psychiatric disturbance F99   8/3/2017 - Present      Health Maintenance        Date Due Completion Dates    PAP SMEAR 2003 ---    IMM INFLUENZA (1) 2016    IMM DTaP/Tdap/Td Vaccine (2 - Td) 8/3/2027 8/3/2017            Current Immunizations     Influenza Vaccine Adult HD 2016    Tdap Vaccine 8/3/2017    Tuberculin Skin Test 2016      Below and/or attached are the medications your provider expects you to take. Review all of your home medications and newly ordered medications with your provider and/or pharmacist.  Follow medication instructions as directed by your provider and/or pharmacist. Please keep your medication list with you and share with your provider. Update the information when medications are discontinued, doses are changed, or new medications (including over-the-counter products) are added; and carry medication information at all times in the event of emergency situations     Allergies:  No Known Allergies          Medications  Valid as of: August 03, 2017 -  4:13 PM    Generic Name Brand Name Tablet Size Instructions for use    ClonazePAM (Tab) KLONOPIN 0.5 MG Take 0.5 Tabs by mouth 2 times a day.        PARoxetine HCl (Tab) PAXIL 20 MG Take 1 Tab by mouth every day.        QUEtiapine Fumarate (Tab) SEROQUEL 25 MG Take 1 Tab by mouth 2 times a day.        Topiramate (Tab) TOPAMAX 50 MG Take 1 Tab by mouth every day.        TraMADol HCl (Tab) ULTRAM 50 MG Take 1 Tab by mouth every four hours as needed.        .                 Medicines prescribed today were sent to:     [address] [address]    Phone: [phone] Fax: [phone]    Open 24 Hours?: No      Medication refill instructions:       If your prescription bottle indicates you have medication refills left, it is not necessary to call your provider’s office. Please contact your pharmacy and they will refill your medication.    If your prescription bottle indicates you do not have any refills left, you may request refills at any time through one of the following ways: The online [facility] system (except Urgent Care), by calling your provider’s office, or by asking your pharmacy to contact your provider’s office with a refill request. Medication refills are processed only during regular business hours and may not be available until the next business day. Your provider may request additional information or to have a follow-up visit with you prior to refilling your medication.   *Please Note:  Medication refills are assigned a new Rx number when refilled electronically. Your pharmacy may indicate that no refills were authorized even though a new prescription for the same medication is available at the pharmacy. Please request the medicine by name with the pharmacy before contacting your provider for a refill.        Your To Do List     Future Labs/Procedures Complete By Expires    MISCELLANEOUS LAB TEST ([facility]/Other)  As directed 8/3/2018    Comments:    Mumps measles rubella titer      Referral     A referral request has been sent to our patient care coordination department. Please allow 3-5 business days for us to process this request and contact you either by phone or mail. If you do not hear from us by the 5th business day, please call us at [phone].        Other Notes About Your Plan     12/14/16 UDS pos for Paxil only.  8/31/16 [facility] UDS shows: Positive for Lexapro, Norco, Percocet, Tramadol, Xanax, Nortriptyline.  12/13/16  UDS pending           [facility] Access Code: Activation code not generated  Current [facility] Status: Active

## 2017-08-03 NOTE — ASSESSMENT & PLAN NOTE
Pt has hx of Anxiety, Depression, Insomnia.  Previously with Psychiatry-Atilio [facility] and Abilio  shows  Rx for pain meds (Tramadol) and Clonazepam prescribed by him. See Abilio   In addition she has been on Paxil, Seroquel, Toparimate, and as late as 7/31/17 Xanax.  Pt reports she lost her ability to see Atilio for Psychiatry (and pain meds) due to him no longer accepting her insurance.  Reports out of her meds for about 5 days.    Has not seen Atilio since July.    I instructed her I would fill her Psych meds for 1 month, but if she needed another months worth prior to re-establishing with Atilio under her anticipated change to HPN, she would need a return appt here and I would recommend a decrease in her Clonazepam.  Pt shows understanding.

## 2017-08-03 NOTE — ASSESSMENT & PLAN NOTE
Pt reports she has been getting Tramadol from Atilio for her chronic left wrist pain, but since he no longer accepts her insurance  She last filledTramadol 50 mg # 120 on 6/29/17. She is asking for refill until she has change in Insurance to HPN and plans to go back   And see Atilio as her provide anticipated for 9/1/17.  I instructed her that I would not be filling it today as she is on multiple psychiatric meds and a benzodiazapine and I do not believe it is a safe combination. In addition I previously had recommended both Ortho and Pain Management, but Danielle had chose to seek pain meds from her Psychiatry provider instead of these specialists.
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
## 2017-08-03 NOTE — ASSESSMENT & PLAN NOTE
"Pt reports 2 weeks ago had vaginal swabs, urine and \"Pap Smear\" done at [facility] as she was  Having slight 'funny smell\" and itching. States was told tests were negative for STD, but awaiting Pap Smear and HPV results.  Was given Diflucan and has not taken yet.  Encouraged her to take medicine as directed by the urgent care provider and call for results of pap and if needed   Any assistance from us to call here.  "

## 2017-08-27 ENCOUNTER — HOSPITAL ENCOUNTER (EMERGENCY)
Facility: MEDICAL CENTER | Age: 37
End: 2017-08-28
Attending: EMERGENCY MEDICINE
Payer: MEDICAID

## 2017-08-27 ENCOUNTER — APPOINTMENT (OUTPATIENT)
Dept: RADIOLOGY | Facility: MEDICAL CENTER | Age: 37
End: 2017-08-27
Attending: EMERGENCY MEDICINE
Payer: MEDICAID

## 2017-08-27 DIAGNOSIS — R10.84 GENERALIZED ABDOMINAL PAIN: ICD-10-CM

## 2017-08-27 LAB
ALBUMIN SERPL BCP-MCNC: 4.3 G/DL (ref 3.2–4.9)
ALBUMIN/GLOB SERPL: 1.5 G/DL
ALP SERPL-CCNC: 33 U/L (ref 30–99)
ALT SERPL-CCNC: 6 U/L (ref 2–50)
ANION GAP SERPL CALC-SCNC: 8 MMOL/L (ref 0–11.9)
APPEARANCE UR: ABNORMAL
APPEARANCE UR: CLEAR
AST SERPL-CCNC: 10 U/L (ref 12–45)
BACTERIA #/AREA URNS HPF: ABNORMAL /HPF
BASOPHILS # BLD AUTO: 0.4 % (ref 0–1.8)
BASOPHILS # BLD: 0.03 K/UL (ref 0–0.12)
BILIRUB SERPL-MCNC: 0.3 MG/DL (ref 0.1–1.5)
BILIRUB UR QL STRIP.AUTO: NEGATIVE
BILIRUB UR QL STRIP.AUTO: NEGATIVE
BUN SERPL-MCNC: 19 MG/DL (ref 8–22)
CALCIUM SERPL-MCNC: 9.4 MG/DL (ref 8.5–10.5)
CHLORIDE SERPL-SCNC: 114 MMOL/L (ref 96–112)
CO2 SERPL-SCNC: 20 MMOL/L (ref 20–33)
COLOR UR: YELLOW
COLOR UR: YELLOW
CREAT SERPL-MCNC: 0.84 MG/DL (ref 0.5–1.4)
CULTURE IF INDICATED INDCX: NO UA CULTURE
CULTURE IF INDICATED INDCX: YES UA CULTURE
EOSINOPHIL # BLD AUTO: 0.02 K/UL (ref 0–0.51)
EOSINOPHIL NFR BLD: 0.3 % (ref 0–6.9)
EPI CELLS #/AREA URNS HPF: ABNORMAL /HPF
ERYTHROCYTE [DISTWIDTH] IN BLOOD BY AUTOMATED COUNT: 43 FL (ref 35.9–50)
GFR SERPL CREATININE-BSD FRML MDRD: >60 ML/MIN/1.73 M 2
GLOBULIN SER CALC-MCNC: 2.9 G/DL (ref 1.9–3.5)
GLUCOSE SERPL-MCNC: 92 MG/DL (ref 65–99)
GLUCOSE UR STRIP.AUTO-MCNC: NEGATIVE MG/DL
GLUCOSE UR STRIP.AUTO-MCNC: NEGATIVE MG/DL
HCG UR QL: NEGATIVE
HCT VFR BLD AUTO: 36.8 % (ref 37–47)
HGB BLD-MCNC: 12 G/DL (ref 12–16)
IMM GRANULOCYTES # BLD AUTO: 0.03 K/UL (ref 0–0.11)
IMM GRANULOCYTES NFR BLD AUTO: 0.4 % (ref 0–0.9)
KETONES UR STRIP.AUTO-MCNC: ABNORMAL MG/DL
KETONES UR STRIP.AUTO-MCNC: NEGATIVE MG/DL
LEUKOCYTE ESTERASE UR QL STRIP.AUTO: ABNORMAL
LEUKOCYTE ESTERASE UR QL STRIP.AUTO: NEGATIVE
LIPASE SERPL-CCNC: 26 U/L (ref 11–82)
LYMPHOCYTES # BLD AUTO: 1.89 K/UL (ref 1–4.8)
LYMPHOCYTES NFR BLD: 26.2 % (ref 22–41)
MCH RBC QN AUTO: 29.1 PG (ref 27–33)
MCHC RBC AUTO-ENTMCNC: 32.6 G/DL (ref 33.6–35)
MCV RBC AUTO: 89.1 FL (ref 81.4–97.8)
MICRO URNS: ABNORMAL
MICRO URNS: ABNORMAL
MONOCYTES # BLD AUTO: 0.45 K/UL (ref 0–0.85)
MONOCYTES NFR BLD AUTO: 6.2 % (ref 0–13.4)
MUCOUS THREADS #/AREA URNS HPF: ABNORMAL /HPF
NEUTROPHILS # BLD AUTO: 4.8 K/UL (ref 2–7.15)
NEUTROPHILS NFR BLD: 66.5 % (ref 44–72)
NITRITE UR QL STRIP.AUTO: NEGATIVE
NITRITE UR QL STRIP.AUTO: NEGATIVE
NRBC # BLD AUTO: 0 K/UL
NRBC BLD AUTO-RTO: 0 /100 WBC
PH UR STRIP.AUTO: 6 [PH]
PH UR STRIP.AUTO: 7 [PH]
PLATELET # BLD AUTO: 328 K/UL (ref 164–446)
PMV BLD AUTO: 9.9 FL (ref 9–12.9)
POTASSIUM SERPL-SCNC: 3.5 MMOL/L (ref 3.6–5.5)
PROT SERPL-MCNC: 7.2 G/DL (ref 6–8.2)
PROT UR QL STRIP: NEGATIVE MG/DL
PROT UR QL STRIP: NEGATIVE MG/DL
RBC # BLD AUTO: 4.13 M/UL (ref 4.2–5.4)
RBC # URNS HPF: ABNORMAL /HPF
RBC UR QL AUTO: NEGATIVE
RBC UR QL AUTO: NEGATIVE
SODIUM SERPL-SCNC: 142 MMOL/L (ref 135–145)
SP GR UR REFRACTOMETRY: 1.01
SP GR UR STRIP.AUTO: 1.01
SP GR UR STRIP.AUTO: 1.02
UROBILINOGEN UR STRIP.AUTO-MCNC: 1 MG/DL
UROBILINOGEN UR STRIP.AUTO-MCNC: NORMAL MG/DL
WBC # BLD AUTO: 7.2 K/UL (ref 4.8–10.8)
WBC #/AREA URNS HPF: ABNORMAL /HPF

## 2017-08-27 PROCEDURE — 96374 THER/PROPH/DIAG INJ IV PUSH: CPT | Mod: XU

## 2017-08-27 PROCEDURE — 74177 CT ABD & PELVIS W/CONTRAST: CPT

## 2017-08-27 PROCEDURE — 85025 COMPLETE CBC W/AUTO DIFF WBC: CPT

## 2017-08-27 PROCEDURE — 81001 URINALYSIS AUTO W/SCOPE: CPT

## 2017-08-27 PROCEDURE — 99285 EMERGENCY DEPT VISIT HI MDM: CPT

## 2017-08-27 PROCEDURE — 81025 URINE PREGNANCY TEST: CPT

## 2017-08-27 PROCEDURE — 87086 URINE CULTURE/COLONY COUNT: CPT

## 2017-08-27 PROCEDURE — 700111 HCHG RX REV CODE 636 W/ 250 OVERRIDE (IP): Performed by: EMERGENCY MEDICINE

## 2017-08-27 PROCEDURE — 36415 COLL VENOUS BLD VENIPUNCTURE: CPT

## 2017-08-27 PROCEDURE — 80053 COMPREHEN METABOLIC PANEL: CPT

## 2017-08-27 PROCEDURE — 83690 ASSAY OF LIPASE: CPT

## 2017-08-27 PROCEDURE — 81003 URINALYSIS AUTO W/O SCOPE: CPT | Mod: 59

## 2017-08-27 RX ADMIN — FENTANYL CITRATE 50 MCG: 50 INJECTION, SOLUTION INTRAMUSCULAR; INTRAVENOUS at 21:05

## 2017-08-27 ASSESSMENT — LIFESTYLE VARIABLES: DO YOU DRINK ALCOHOL: NO

## 2017-08-27 ASSESSMENT — PAIN SCALES - GENERAL: PAINLEVEL_OUTOF10: 7

## 2017-08-27 NOTE — ED NOTES
Pt comes in complaining of neck pain, trouble sleeping, and lower pelvic pain. Pt also reporting trouble with urination. Pt also reporting no BM for 4 days.

## 2017-08-28 VITALS
WEIGHT: 95.46 LBS | BODY MASS INDEX: 18.74 KG/M2 | OXYGEN SATURATION: 97 % | HEIGHT: 60 IN | DIASTOLIC BLOOD PRESSURE: 67 MMHG | HEART RATE: 61 BPM | SYSTOLIC BLOOD PRESSURE: 108 MMHG | RESPIRATION RATE: 14 BRPM | TEMPERATURE: 99.6 F

## 2017-08-28 LAB
BACTERIA UR CULT: NORMAL
SIGNIFICANT IND 70042: NORMAL
SITE SITE: NORMAL
SOURCE SOURCE: NORMAL

## 2017-08-28 RX ORDER — DOCUSATE SODIUM 100 MG/1
100 CAPSULE, LIQUID FILLED ORAL 2 TIMES DAILY
Qty: 60 CAP | Refills: 0 | Status: SHIPPED | OUTPATIENT
Start: 2017-08-28 | End: 2019-11-12

## 2017-08-28 NOTE — ED PROVIDER NOTES
ED Provider Note    CHIEF COMPLAINT  Chief Complaint   Patient presents with   • Painful Urination   • Weakness   • Neck Pain       HPI  Danielle Barcenas is a 37 y.o. female who presentsTo the emergency department with abdominal discomfort. The patient states over the last several days she's had cramping abdominal pain. She states the pain is in the lower quadrants. She has been constipated and has not stooled in that time.. She also has associated nausea as well as periodic fevers. The patient states she also has pain with urination. She is not having irregular vaginal discharge nor bleeding. She is also some pain throughout her neck. She does not have any headaches. She says she's also had some generalized weakness.    REVIEW OF SYSTEMS  See HPI for further details. All other systems are negative.     PAST MEDICAL HISTORY  No past medical history on file.    SOCIAL HISTORY  Social History     Social History   • Marital status: Single     Spouse name: N/A   • Number of children: N/A   • Years of education: N/A     Social History Main Topics   • Smoking status: Current Every Day Smoker     Packs/day: 0.50     Types: Cigarettes   • Smokeless tobacco: Current User   • Alcohol use No   • Drug use: No   • Sexual activity: Yes     Other Topics Concern   • Not on file     Social History Narrative    ** Merged History Encounter **                PHYSICAL EXAM  VITAL SIGNS: /67   Pulse 73   Temp 37.6 °C (99.6 °F) (Temporal)   Resp 14   Ht 1.524 m (5')   Wt 43.3 kg (95 lb 7.4 oz)   LMP 08/01/2017 (Within Days)   SpO2 100%   BMI 18.64 kg/m²   Constitutional: Well developed, Well nourished, No acute distress, Non-toxic appearance.   HENT: Normocephalic, Atraumatic, tympanic membranes are intact and nonerythematous bilaterally, Oropharynx moist without exudates or erythema, Nose normal.   Eyes: PERRLA, EOMI, Conjunctiva normal.  Neck: Supple without meningismus  Lymphatic: No lymphadenopathy noted.    Cardiovascular: Normal heart rate, Normal rhythm, No murmurs, No rubs, No gallops.   Thorax & Lungs: Normal breath sounds, No respiratory distress, No wheezing, No chest tenderness.   Abdomen:Diffuse abdominal discomfort, no rebound, no guarding, normal bowel sounds   Skin: Warm, Dry, No erythema, No rash.   Back: No tenderness, No CVA tenderness.   Extremities: Atraumatic with symmetric distal pulses, No edema, No tenderness, No cyanosis, No clubbing.   Neurologic: Alert & oriented x 3, cranial nerves II through XII are intact, Normal motor function, Normal sensory function, No focal deficits noted.   Psychiatric: Affect normal, Judgment normal, Mood normal.     RADIOLOGY/PROCEDURES  CT-ABDOMEN-PELVIS WITH   Final Result      1.  Moderate amount of stool in the sigmoid colon.            COURSE & MEDICAL DECISION MAKING  Pertinent Labs & Imaging studies reviewed. (See chart for details)  This a 37-year-old female who presents with abdominal discomfort. As for the source suspect this is from constipation as she does have a large amount of stool in her colon. The patient does not have a leukocytosis or otherwise inflammatory processes visualized on the CT scan. Initially her urinalysis was contaminated and therefore repeated the urinalysis which appears clear. The patient is not pregnant. The patient will be discharged on Colace with instructions to utilize enemas. She will follow up with GI consult and says she has had recurrent abdominal pain for quite some time. She'll return to the emergency department if she is acutely worse.    FINAL IMPRESSION  1. Abdominal pain  2. Suspect secondary to constipation     Disposition  The patient will be discharged in stable condition    Electronically signed by: Barak Gorman, 8/27/2017 8:38 PM

## 2017-08-28 NOTE — DISCHARGE INSTRUCTIONS
Abdominal Pain (Nonspecific)  Your exam might not show the exact reason you have abdominal pain. Since there are many different causes of abdominal pain, another checkup and more tests may be needed. It is very important to follow up for lasting (persistent) or worsening symptoms. A possible cause of abdominal pain in any person who still has his or her appendix is acute appendicitis. Appendicitis is often hard to diagnose. Normal blood tests, urine tests, ultrasound, and CT scans do not completely rule out early appendicitis or other causes of abdominal pain. Sometimes, only the changes that happen over time will allow appendicitis and other causes of abdominal pain to be determined. Other potential problems that may require surgery may also take time to become more apparent. Because of this, it is important that you follow all of the instructions below.  HOME CARE INSTRUCTIONS   · Rest as much as possible.   · Do not eat solid food until your pain is gone.   · While adults or children have pain: A diet of water, weak decaffeinated tea, broth or bouillon, gelatin, oral rehydration solutions (ORS), frozen ice pops, or ice chips may be helpful.   · When pain is gone in adults or children: Start a light diet (dry toast, crackers, applesauce, or white rice). Increase the diet slowly as long as it does not bother you. Eat no dairy products (including cheese and eggs) and no spicy, fatty, fried, or high-fiber foods.   · Use no alcohol, caffeine, or cigarettes.   · Take your regular medicines unless your caregiver told you not to.   · Take any prescribed medicine as directed.   · Only take over-the-counter or prescription medicines for pain, discomfort, or fever as directed by your caregiver. Do not give aspirin to children.   If your caregiver has given you a follow-up appointment, it is very important to keep that appointment. Not keeping the appointment could result in a permanent injury and/or lasting (chronic) pain  and/or disability. If there is any problem keeping the appointment, you must call to reschedule.   SEEK IMMEDIATE MEDICAL CARE IF:   · Your pain is not gone in 24 hours.   · Your pain becomes worse, changes location, or feels different.   · You or your child has an oral temperature above 102° F (38.9° C), not controlled by medicine.   · Your baby is older than 3 months with a rectal temperature of 102° F (38.9° C) or higher.   · Your baby is 3 months old or younger with a rectal temperature of 100.4° F (38° C) or higher.   · You have shaking chills.   · You keep throwing up (vomiting) or cannot drink liquids.   · There is blood in your vomit or you see blood in your bowel movements.   · Your bowel movements become dark or black.   · You have frequent bowel movements.   · Your bowel movements stop (become blocked) or you cannot pass gas.   · You have bloody, frequent, or painful urination.   · You have yellow discoloration in the skin or whites of the eyes.   · Your stomach becomes bloated or bigger.   · You have dizziness or fainting.   · You have chest or back pain.   MAKE SURE YOU:   · Understand these instructions.   · Will watch your condition.   · Will get help right away if you are not doing well or get worse.   Document Released: 12/18/2006 Document Revised: 03/11/2013 Document Reviewed: 11/15/2010  Exit[employer]® Patient Information ©2013 [employer].

## 2017-08-28 NOTE — ED NOTES
Pt discharged home. Explained discharge and medication instructions. Questions and comments addressed. Pt verbalized understanding of instructions. Pt advised to follow-up with GI consultants or return to ED for any new or worsening of symptoms. Pt is ambulating well and steady on feet. VS stable. Pt ambulatory to ED lobby now.

## 2017-08-28 NOTE — ED NOTES
Pt ambulated to [facility]. Pt changed into gown and placed on monitor.  Agree with triage note.   Chart up and ready for ERP.

## 2017-10-03 ENCOUNTER — HOSPITAL ENCOUNTER (EMERGENCY)
Dept: HOSPITAL 8 - ED | Age: 37
Discharge: HOME | End: 2017-10-03
Payer: MEDICAID

## 2017-10-03 VITALS — BODY MASS INDEX: 17.79 KG/M2 | WEIGHT: 90.61 LBS | HEIGHT: 60 IN

## 2017-10-03 VITALS — DIASTOLIC BLOOD PRESSURE: 62 MMHG | SYSTOLIC BLOOD PRESSURE: 95 MMHG

## 2017-10-03 DIAGNOSIS — F17.210: ICD-10-CM
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
## 2017-10-03 DIAGNOSIS — S20.212A: Primary | ICD-10-CM

## 2017-10-03 PROCEDURE — 99284 EMERGENCY DEPT VISIT MOD MDM: CPT

## 2017-10-03 PROCEDURE — 71020: CPT

## 2018-02-01 ENCOUNTER — TELEPHONE (OUTPATIENT)
Dept: RADIOLOGY | Facility: MEDICAL CENTER | Age: 38
End: 2018-02-01

## 2018-02-02 ENCOUNTER — HOSPITAL ENCOUNTER (OUTPATIENT)
Dept: RADIOLOGY | Facility: MEDICAL CENTER | Age: 38
End: 2018-02-02
Attending: NURSE PRACTITIONER
Payer: MEDICAID

## 2018-02-02 DIAGNOSIS — N63.22 BREAST LUMP ON LEFT SIDE AT 10 O'CLOCK POSITION: ICD-10-CM

## 2018-02-02 DIAGNOSIS — N63.11 BREAST LUMP ON RIGHT SIDE AT 10 O'CLOCK POSITION: ICD-10-CM

## 2018-02-02 PROCEDURE — 77066 DX MAMMO INCL CAD BI: CPT

## 2018-02-02 PROCEDURE — 76642 ULTRASOUND BREAST LIMITED: CPT | Mod: RT

## 2018-04-18 ENCOUNTER — EH NON-PROVIDER (OUTPATIENT)
Dept: OCCUPATIONAL MEDICINE | Facility: CLINIC | Age: 38
End: 2018-04-18

## 2018-04-18 DIAGNOSIS — Z02.1 PRE-EMPLOYMENT HEALTH SCREENING EXAMINATION: ICD-10-CM

## 2018-04-18 DIAGNOSIS — Z02.1 PRE-EMPLOYMENT DRUG SCREENING: ICD-10-CM

## 2018-04-18 LAB
AMP AMPHETAMINE: NORMAL
BAR BARBITURATES: NORMAL
BZO BENZODIAZEPINES: NORMAL
COC COCAINE: NORMAL
INT CON NEG: NORMAL
INT CON POS: NORMAL
MDMA ECSTASY: NORMAL
MET METHAMPHETAMINES: NORMAL
MTD METHADONE: NORMAL
OPI OPIATES: NORMAL
OXY OXYCODONE: NORMAL
PCP PHENCYCLIDINE: NORMAL
POC URINE DRUG SCREEN OCDRS: NORMAL
THC: NORMAL

## 2018-04-18 PROCEDURE — 80305 DRUG TEST PRSMV DIR OPT OBS: CPT | Performed by: PREVENTIVE MEDICINE

## 2018-04-27 ENCOUNTER — EMPLOYEE HEALTH (OUTPATIENT)
Dept: OCCUPATIONAL MEDICINE | Facility: CLINIC | Age: 38
End: 2018-04-27

## 2018-04-27 ENCOUNTER — HOSPITAL ENCOUNTER (OUTPATIENT)
Facility: MEDICAL CENTER | Age: 38
End: 2018-04-27
Attending: PREVENTIVE MEDICINE
Payer: COMMERCIAL

## 2018-04-27 ENCOUNTER — EH NON-PROVIDER (OUTPATIENT)
Dept: OCCUPATIONAL MEDICINE | Facility: CLINIC | Age: 38
End: 2018-04-27

## 2018-04-27 VITALS
TEMPERATURE: 97.9 F | RESPIRATION RATE: 12 BRPM | SYSTOLIC BLOOD PRESSURE: 98 MMHG | DIASTOLIC BLOOD PRESSURE: 68 MMHG | BODY MASS INDEX: 18.26 KG/M2 | OXYGEN SATURATION: 98 % | WEIGHT: 93 LBS | HEART RATE: 70 BPM | HEIGHT: 60 IN

## 2018-04-27 DIAGNOSIS — Z02.1 ENCOUNTER FOR PRE-EMPLOYMENT HEALTH SCREENING EXAMINATION: ICD-10-CM

## 2018-04-27 DIAGNOSIS — Z02.89 VISIT FOR OCCUPATIONAL HEALTH EXAMINATION: ICD-10-CM

## 2018-04-27 LAB
MEV IGG SER IA-ACNC: 2.97
MUV IGG SER IA-ACNC: 0.76
RUBV AB SER QL: 175.4 IU/ML

## 2018-04-27 PROCEDURE — 86735 MUMPS ANTIBODY: CPT | Performed by: PREVENTIVE MEDICINE

## 2018-04-27 PROCEDURE — 86480 TB TEST CELL IMMUN MEASURE: CPT | Performed by: PREVENTIVE MEDICINE

## 2018-04-27 PROCEDURE — 8915 PR COMPREHENSIVE PHYSICAL: Performed by: PREVENTIVE MEDICINE

## 2018-04-27 PROCEDURE — 86762 RUBELLA ANTIBODY: CPT | Performed by: PREVENTIVE MEDICINE

## 2018-04-27 PROCEDURE — 86765 RUBEOLA ANTIBODY: CPT | Performed by: PREVENTIVE MEDICINE

## 2018-04-28 LAB
M TB TUBERC IFN-G BLD QL: NEGATIVE
M TB TUBERC IFN-G/MITOGEN IGNF BLD: 0.02
M TB TUBERC IGNF/MITOGEN IGNF CONTROL: 44.9 [IU]/ML
MITOGEN IGNF BCKGRD COR BLD-ACNC: 0.02 [IU]/ML

## 2018-04-30 ENCOUNTER — DOCUMENTATION (OUTPATIENT)
Dept: OCCUPATIONAL MEDICINE | Facility: CLINIC | Age: 38
End: 2018-04-30

## 2018-04-30 NOTE — PROGRESS NOTES
Pre-employment medical surveillance reviewed and signed. Quantiferon result documented in immunizations. Document scanned and returned to monthly assigned MA.

## 2018-08-14 ENCOUNTER — TELEPHONE (OUTPATIENT)
Dept: RADIOLOGY | Facility: MEDICAL CENTER | Age: 38
End: 2018-08-14

## 2018-08-22 ENCOUNTER — HOSPITAL ENCOUNTER (OUTPATIENT)
Dept: RADIOLOGY | Facility: MEDICAL CENTER | Age: 38
End: 2018-08-22
Attending: NURSE PRACTITIONER
Payer: MEDICAID

## 2018-08-22 DIAGNOSIS — D24.9 BENIGN NEOPLASM OF BREAST, UNSPECIFIED LATERALITY: ICD-10-CM

## 2018-08-22 PROCEDURE — 76642 ULTRASOUND BREAST LIMITED: CPT | Mod: LT

## 2018-09-24 ENCOUNTER — OFFICE VISIT (OUTPATIENT)
Dept: INTERNAL MEDICINE | Facility: MEDICAL CENTER | Age: 38
End: 2018-09-24
Payer: MEDICAID

## 2018-09-24 VITALS
SYSTOLIC BLOOD PRESSURE: 115 MMHG | HEART RATE: 74 BPM | DIASTOLIC BLOOD PRESSURE: 79 MMHG | BODY MASS INDEX: 18.46 KG/M2 | HEIGHT: 60 IN | OXYGEN SATURATION: 97 % | WEIGHT: 94 LBS | TEMPERATURE: 99.6 F

## 2018-09-24 DIAGNOSIS — G47.00 INSOMNIA, UNSPECIFIED TYPE: ICD-10-CM

## 2018-09-24 DIAGNOSIS — N93.0 PCB (POST COITAL BLEEDING): ICD-10-CM

## 2018-09-24 DIAGNOSIS — F99 PSYCHIATRIC DISTURBANCE: ICD-10-CM

## 2018-09-24 DIAGNOSIS — G43.809 OTHER MIGRAINE WITHOUT STATUS MIGRAINOSUS, NOT INTRACTABLE: ICD-10-CM

## 2018-09-24 PROCEDURE — 99204 OFFICE O/P NEW MOD 45 MIN: CPT | Mod: GC | Performed by: INTERNAL MEDICINE

## 2018-09-24 RX ORDER — PAROXETINE HYDROCHLORIDE 20 MG/1
20 TABLET, FILM COATED ORAL DAILY
Qty: 30 TAB | Refills: 1 | Status: SHIPPED | OUTPATIENT
Start: 2018-09-24 | End: 2019-11-12

## 2018-09-24 RX ORDER — QUETIAPINE FUMARATE 25 MG/1
25 TABLET, FILM COATED ORAL
Qty: 30 TAB | Refills: 1 | Status: SHIPPED | OUTPATIENT
Start: 2018-09-24 | End: 2019-11-12

## 2018-09-24 RX ORDER — TOPIRAMATE 50 MG/1
50 TABLET, FILM COATED ORAL DAILY
Qty: 30 TAB | Refills: 2 | Status: SHIPPED | OUTPATIENT
Start: 2018-09-24 | End: 2019-12-19 | Stop reason: SDUPTHER

## 2018-09-24 ASSESSMENT — PATIENT HEALTH QUESTIONNAIRE - PHQ9
CLINICAL INTERPRETATION OF PHQ2 SCORE: 2
5. POOR APPETITE OR OVEREATING: 0 - NOT AT ALL
SUM OF ALL RESPONSES TO PHQ QUESTIONS 1-9: 6

## 2018-09-24 NOTE — PATIENT INSTRUCTIONS
Refills provided for paxil, topamax, seroquel.  Follow up with psychiatry as scheduled.  Referral given to gynecology  Follow up in 5 weeks for a long visit.

## 2018-09-24 NOTE — PROGRESS NOTES
Established Patient    Danielle presents today with the following:    CC: Follow up for depression, med refills.    HPI: , 38 year old female with past medical history significant for migraine headaches, insomnia, depression with anxiety presents to the clinic for a follow up visit.    Reports that she wants to establish with a new PCP, as her doctor now is a male and would like to switch to a female physician. Her concerns today are medication refills. Her psychiatrist has retired , she had recently established with a new psychiatrist, however is still in the phase of intake and have not seen the doctor in person.    Depression : Reports that it was well controlled on paxil, has run out of it a month ago. Was unable to refill it with primary. Requesting refills as she feels better when she takes the medication. Denies SI/HI.    Post-coital bleeding : Reports recent episodes of dyspareunia and post-coital bleeding 2 weeks and a week ago, lasted a day after the intercourse. Would like to see a gynecologist, needs a referral.    Insomnia : Takes seroquel every night, helps her rest better. Requesting refills.    Migraine headaches : Well-controlled on topamax. No frequent episodes. Requesting refills.     Does not currently work, has a 4 year old boy, lives in [place]. Does not smoke, does not drink alcohol, does not use recreational drugs. LMP was 8/28, regular with normal flow.      Patient Active Problem List    Diagnosis Date Noted   • Psychiatric disturbance 08/03/2017   • Neck pain, acute 08/30/2016   • Routine health maintenance 08/08/2016   • Anxiety 08/08/2016   • Encounter for birth control 08/08/2016   • Chronic pain of left wrist 08/08/2016       Current Outpatient Prescriptions   Medication Sig Dispense Refill   • QUEtiapine (SEROQUEL) 25 MG Tab Take 1 Tab by mouth every bedtime. 30 Tab 1   • topiramate (TOPAMAX) 50 MG tablet Take 1 Tab by mouth every day. 30 Tab 2   • PARoxetine (PAXIL) 20 MG  Tab Take 1 Tab by mouth every day. 30 Tab 1   • clonazepam (KLONOPIN) 0.5 MG Tab Take 0.5 Tabs by mouth 2 times a day. 60 Tab 0   • docusate sodium (COLACE) 100 MG Cap Take 1 Cap by mouth 2 times a day. 60 Cap 0   • tramadol (ULTRAM) 50 MG Tab Take 1 Tab by mouth every four hours as needed. 30 Tab 0     No current facility-administered medications for this visit.        ROS: As per HPI. Additional pertinent symptoms as noted below.    All others negative    /79 (BP Location: Left arm, Patient Position: Sitting, BP Cuff Size: Small adult)   Pulse 74   Temp 37.6 °C (99.6 °F) (Temporal)   Ht 1.524 m (5')   Wt 42.6 kg (94 lb)   SpO2 97%   BMI 18.36 kg/m²     Physical Exam   Constitutional:  oriented to person, place, and time. No distress.   Eyes: Pupils are equal, round, and reactive to light. No scleral icterus.  Neck: Neck supple. No thyromegaly present.   Cardiovascular: Normal rate, regular rhythm and normal heart sounds.  Exam reveals no gallop and no friction rub.  No murmur heard.  Pulmonary/Chest: Breath sounds normal. Chest wall is not dull to percussion.   Musculoskeletal:   no edema.   Lymphadenopathy: no cervical adenopathy  Neurological: alert and oriented to person, place, and time.   Skin: No cyanosis. Nails show no clubbing.    Assessment and Plan    # PCB (post coital bleeding)  - 2 episodes lasting a day after intercourse in the past month  - referral given to gynecology    # Psychiatric disturbance/ Depression with anxiety  - well- controlled with Paxil, denies SI/HI  - PHQ9 score - 6  - refills provided for Paxil  - follow up with psychiatry as scheduled    # Other migraine without status migrainosus, not intractable  - well- controlled with medication  - refills provided for topamax    # Insomnia, unspecified type  - takes seroquel once every night  - refills provided.    Follow up in 5 weeks for a long visit to establish care.     Signed by: Mitzi English M.D.

## 2019-11-05 ENCOUNTER — TELEPHONE (OUTPATIENT)
Dept: SCHEDULING | Facility: IMAGING CENTER | Age: 39
End: 2019-11-05

## 2019-11-12 ENCOUNTER — TELEPHONE (OUTPATIENT)
Dept: MEDICAL GROUP | Facility: CLINIC | Age: 39
End: 2019-11-12

## 2019-11-12 ENCOUNTER — HOSPITAL ENCOUNTER (OUTPATIENT)
Facility: MEDICAL CENTER | Age: 39
End: 2019-11-12
Attending: PHYSICIAN ASSISTANT
Payer: MEDICAID

## 2019-11-12 ENCOUNTER — OFFICE VISIT (OUTPATIENT)
Dept: MEDICAL GROUP | Facility: CLINIC | Age: 39
End: 2019-11-12
Payer: MEDICAID

## 2019-11-12 VITALS
RESPIRATION RATE: 18 BRPM | OXYGEN SATURATION: 100 % | HEART RATE: 52 BPM | HEIGHT: 61 IN | DIASTOLIC BLOOD PRESSURE: 64 MMHG | SYSTOLIC BLOOD PRESSURE: 122 MMHG | TEMPERATURE: 99.1 F | BODY MASS INDEX: 21.52 KG/M2 | WEIGHT: 114 LBS

## 2019-11-12 DIAGNOSIS — Z00.00 ENCOUNTER FOR MEDICAL EXAMINATION TO ESTABLISH CARE: ICD-10-CM

## 2019-11-12 DIAGNOSIS — N94.6 MENSTRUAL PAIN: ICD-10-CM

## 2019-11-12 DIAGNOSIS — R51.9 CHRONIC INTRACTABLE HEADACHE, UNSPECIFIED HEADACHE TYPE: ICD-10-CM

## 2019-11-12 DIAGNOSIS — N94.10 DYSPAREUNIA IN FEMALE: ICD-10-CM

## 2019-11-12 DIAGNOSIS — N92.0 MENORRHAGIA WITH REGULAR CYCLE: ICD-10-CM

## 2019-11-12 DIAGNOSIS — G89.29 CHRONIC INTRACTABLE HEADACHE, UNSPECIFIED HEADACHE TYPE: ICD-10-CM

## 2019-11-12 DIAGNOSIS — Z30.011 ENCOUNTER FOR INITIAL PRESCRIPTION OF CONTRACEPTIVE PILLS: ICD-10-CM

## 2019-11-12 DIAGNOSIS — K59.00 CONSTIPATION, UNSPECIFIED CONSTIPATION TYPE: ICD-10-CM

## 2019-11-12 LAB
INT CON NEG: NEGATIVE
INT CON POS: POSITIVE
POC URINE PREGNANCY TEST: NEGATIVE

## 2019-11-12 PROCEDURE — 81025 URINE PREGNANCY TEST: CPT | Performed by: PHYSICIAN ASSISTANT

## 2019-11-12 PROCEDURE — 87591 N.GONORRHOEAE DNA AMP PROB: CPT

## 2019-11-12 PROCEDURE — 99204 OFFICE O/P NEW MOD 45 MIN: CPT | Performed by: PHYSICIAN ASSISTANT

## 2019-11-12 PROCEDURE — 87491 CHLMYD TRACH DNA AMP PROBE: CPT

## 2019-11-12 RX ORDER — LEVONORGESTREL / ETHINYL ESTRADIOL AND ETHINYL ESTRADIOL 150-30(84)
1 KIT ORAL DAILY
Qty: 91 TAB | Refills: 1 | Status: SHIPPED | OUTPATIENT
Start: 2019-11-12 | End: 2019-11-19

## 2019-11-12 RX ORDER — LEVONORGESTREL / ETHINYL ESTRADIOL AND ETHINYL ESTRADIOL 150-30(84)
1 KIT ORAL DAILY
Qty: 91 TAB | Refills: 1 | Status: SHIPPED | OUTPATIENT
Start: 2019-11-12 | End: 2019-11-12 | Stop reason: SDUPTHER

## 2019-11-12 NOTE — ASSESSMENT & PLAN NOTE
This has been problematic for the past couple of years, the patient states she wakes up with it every morning, has tried midol with some relief.   She feels that topiramate used to work but after stopping the medicine while pregnant and then after her c section, this is no longer working. She feels the pain is a band across her forehead and goes down her bilateral temples. Around 10am daily it seems to be much worse, as the midol wears off.

## 2019-11-12 NOTE — ASSESSMENT & PLAN NOTE
Since her  section in May 2019, this patient states she has had severe constipation requiring daily stool softeners.  She has increased her vegetable intake, consumes about 6 large glasses of water per day and does walk frequently to attempt to alleviate this without good relief.  She has tried milk of magnesia with relief for 1 day but with constipation following a very crampy evacuation of her bowels.

## 2019-11-12 NOTE — ASSESSMENT & PLAN NOTE
See all other notes from today. Patient wishes to restart birth control to attempt to reduce all associated symptoms with abnormal menstrual cycles.

## 2019-11-12 NOTE — ASSESSMENT & PLAN NOTE
This is a new patient, who lives in [place], [place] and drove about 1.5 hours to this clinic to be seen as this was the first available provider within a 3 month wait.

## 2019-11-12 NOTE — ASSESSMENT & PLAN NOTE
Since her  section in May 2019, this patient has noticed increased sharp pains with sexual intercourse.

## 2019-11-12 NOTE — ASSESSMENT & PLAN NOTE
This patient states that since her  section in May 2019, she has had nearly constant spotting and extremely heavy menstrual cycles with severe cramping.  She had been using the NuvaRing as a hormonal birth control but states that she continued to spot through this hormonal medication.  She states her sister uses Seasonique with excellent control of menstrual cycles.

## 2019-11-12 NOTE — PROGRESS NOTES
Chief Complaint   Patient presents with   • Procedure     scarring from c esction       HISTORY OF THE PRESENT ILLNESS: This is a 39 y.o. female new patient to our practice who presents today for evaluation and management of:    Chronic intractable headache  This has been problematic for the past couple of years, the patient states she wakes up with it every morning, has tried midol with some relief.   She feels that topiramate used to work but after stopping the medicine while pregnant and then after her c section, this is no longer working. She feels the pain is a band across her forehead and goes down her bilateral temples. Around 10am daily it seems to be much worse, as the midol wears off.     Menstrual pain  Since his  section in May 2019, this patient states she has had severe cramping and pain with her menstrual cycles.  She states that this never occurred until her most recent  section and she believes it is due to the type of repair that occurred on her uterus.    Menorrhagia with regular cycle  This patient states that since her  section in May 2019, she has had nearly constant spotting and extremely heavy menstrual cycles with severe cramping.  She had been using the NuvaRing as a hormonal birth control but states that she continued to spot through this hormonal medication.  She states her sister uses Seasonique with excellent control of menstrual cycles.    Dyspareunia in female  Since her  section in May 2019, this patient has noticed increased sharp pains with sexual intercourse.    Constipation  Since her  section in May 2019, this patient states she has had severe constipation requiring daily stool softeners.  She has increased her vegetable intake, consumes about 6 large glasses of water per day and does walk frequently to attempt to alleviate this without good relief.  She has tried milk of magnesia with relief for 1 day but with constipation following a  very crampy evacuation of her bowels.    Encounter for birth control  See all other notes from today. Patient wishes to restart birth control to attempt to reduce all associated symptoms with abnormal menstrual cycles.     Encounter for medical examination to establish care  This is a new patient, who lives in [place], [place] and drove about 1.5 hours to this clinic to be seen as this was the first available provider within a 3 month wait.       No past medical history on file.    Past Surgical History:   Procedure Laterality Date   • GYN SURGERY     • OTHER ORTHOPEDIC SURGERY     • WRIST ORIF Left     orif  after horse riding accident       Family Status   Relation Name Status   • Mo  Alive   • Fa  Alive   • MGFa       Family History   Problem Relation Age of Onset   • Stroke Maternal Grandfather        Social History     Tobacco Use   • Smoking status: Current Every Day Smoker     Packs/day: 0.50     Types: Cigarettes   • Smokeless tobacco: Current User   • Tobacco comment: Vape daily    Substance Use Topics   • Alcohol use: No     Alcohol/week: 0.0 oz   • Drug use: No       Allergies: Toradol    Current Outpatient Medications Ordered in Epic   Medication Sig Dispense Refill   • Levonorgest-Eth Estrad 91-Day 0.15-0.03 &0.01 MG Tab Take 1 Tab by mouth every day. 91 Tab 1   • topiramate (TOPAMAX) 50 MG tablet Take 1 Tab by mouth every day. 30 Tab 2     No current Epic-ordered facility-administered medications on file.      Review of Systems: See HPI above.   Constitutional: Negative for fever, chills, unplanned weight change and malaise/fatigue.   HENT: Negative for ear pain or tinnitus, nosebleeds, congestion, sore throat and neck pain.    Eyes: Negative for blurred or decreased vision.   Respiratory: Negative for cough, sputum production, shortness of breath and wheezing.    Cardiovascular: Negative for chest pain, palpitations, orthopnea, syncope and leg swelling.   Gastrointestinal: Negative for  "heartburn, nausea, vomiting. POsitive for left sided lower abdominal pain and lower frontal abdominal pain  Genitourinary: Negative for dysuria, urgency and frequency. See above.   Musculoskeletal: Negative for myalgias, back pain and joint pain. Positive for cramping as noted above.   Skin: Negative for rash, itching, nail changes or skin changes.   Neurological: Negative for dizziness, tremors, sensory change, focal weakness, tingling and headaches.   Endo/Heme/Allergies: Does not bruise/bleed easily.    Psychiatric/Behavioral: Negative for depression, suicidal ideas and memory loss. The patient is nervous/anxious and does not have insomnia.  Pt does not use recreational drugs or excessive alcohol.       Exam:  /64 (BP Location: Right arm, Patient Position: Sitting, BP Cuff Size: Adult)   Pulse (!) 52   Temp 37.3 °C (99.1 °F)   Resp 18   Ht 1.549 m (5' 1\")   Wt 51.7 kg (114 lb)   SpO2 100%   Body mass index is 21.54 kg/m².  General:  Healthy-Appearing female in NAD  Eyes: Conjunctiva clear, lids without ptosis, pupils equal and reactive to light accommodation.  ENMT: Nose and lips without deformity. Nasal mucosa and septum pink without evidence of purulent drainage.  Neck: Supple without masses upon palpation. Thyroid is not visibly enlarged.  Pulmonary: Normal effort. No rales, ronchi, or wheezing upon auscultation.   Cardiovascular: Regular rate and rhythm without murmur. Carotid and radial pulses are intact and equal bilaterally.   Extremities: No clubbing or cyanosis. Bilateral upper and lower extremities without edema.   GI: Normoactive bowel sounds in all 4 quadrants. nondistended. Without palpable hepatosplenomegaly. Palpable rubbery abnormalities under scar tissue of cesarian section and proximal toward umbilicus.   Neurologic: Deep tendon reflexes 2+/4 bilaterally.   Skin: Warm and dry.  No obvious lesions.  Musculoskeletal: Normal gait.   Psych: Normal mood and affect. Alert and oriented x3. " Judgment and insight is normal.      Medical Decision Making & Discussion:   1. Chronic intractable headache, unspecified headache type  Discussed how this may be hormone related, and will treat as though this is the cause for now because patient was on birth control prior to pregnancy, and is not since headaches have become worse.     2. Menorrhagia with regular cycle    - POCT Pregnancy  - Levonorgest-Eth Estrad 91-Day 0.15-0.03 &0.01 MG Tab; Take 1 Tab by mouth every day.  Dispense: 91 Tab; Refill: 1    3. Menstrual pain    - POCT Pregnancy  - Levonorgest-Eth Estrad 91-Day 0.15-0.03 &0.01 MG Tab; Take 1 Tab by mouth every day.  Dispense: 91 Tab; Refill: 1    4. Dyspareunia in female  Not likely an STD but will check, in case. Likely due to scar tissue post .   - CHLAMYDIA/GC PCR URINE OR SWAB; Future    5. Constipation, unspecified constipation type  Discussed water intake, daily senna tea and vegetable intake. Patient will try this and will not hold her stool in.     6. Encounter for initial prescription of contraceptive pills  - POCT Pregnancy  - Levonorgest-Eth Estrad 91-Day 0.15-0.03 &0.01 MG Tab; Take 1 Tab by mouth every day.  Dispense: 91 Tab; Refill: 1    7. Encounter for medical examination to establish care  I am happy to participate in the care of this pleasant 39 year old Woman.           Please note that this dictation was created using voice recognition software. I have made every reasonable attempt to correct obvious errors, but I expect that there are errors of grammar and possibly content that I did not discover before finalizing the note.    Return in about 6 weeks (around 2019) for headaches and menstraul ssues. .

## 2019-11-12 NOTE — ASSESSMENT & PLAN NOTE
Since his  section in May 2019, this patient states she has had severe cramping and pain with her menstrual cycles.  She states that this never occurred until her most recent  section and she believes it is due to the type of repair that occurred on her uterus.

## 2019-11-12 NOTE — TELEPHONE ENCOUNTER
MEDICATION PRIOR AUTHORIZATION:    1. Name of Medication: Levonorgest-Eth Estrad 91-Day 0.15-0.03 &0.01 MG Tab        2. Requested By (Name of Pharmacy): [facility]     3. Is insurance on file current? yes    4. What is the name & phone number of the 3rd party payor?   Covermymeds  Key: [id number]

## 2019-11-13 DIAGNOSIS — N94.10 DYSPAREUNIA IN FEMALE: ICD-10-CM

## 2019-11-13 LAB
C TRACH DNA SPEC QL NAA+PROBE: NEGATIVE
N GONORRHOEA DNA SPEC QL NAA+PROBE: NEGATIVE
SPECIMEN SOURCE: NORMAL

## 2019-11-14 ENCOUNTER — TELEPHONE (OUTPATIENT)
Dept: MEDICAL GROUP | Facility: CLINIC | Age: 39
End: 2019-11-14

## 2019-11-14 NOTE — TELEPHONE ENCOUNTER
Phone Number Called: [phone] (home)       Call outcome: left message for patient to call back regarding message below    Message: patient advised and will follow up as needed.

## 2019-11-14 NOTE — TELEPHONE ENCOUNTER
----- Message from Gayathri Rivas P.A.-C. sent at 11/14/2019  8:17 AM PST -----  I reviewed STD testing. Everything is within normal limits and looks good. Return for follow up as scheduled.

## 2019-11-18 ENCOUNTER — TELEPHONE (OUTPATIENT)
Dept: MEDICAL GROUP | Facility: CLINIC | Age: 39
End: 2019-11-18

## 2019-11-18 DIAGNOSIS — N94.10 DYSPAREUNIA IN FEMALE: ICD-10-CM

## 2019-11-18 DIAGNOSIS — N92.0 MENORRHAGIA WITH REGULAR CYCLE: ICD-10-CM

## 2019-11-19 RX ORDER — LEVONORGESTREL AND ETHINYL ESTRADIOL 0.15-0.03
1 KIT ORAL DAILY
Qty: 84 TAB | Refills: 3 | Status: SHIPPED | OUTPATIENT
Start: 2019-11-19 | End: 2019-12-19

## 2019-11-19 RX ORDER — LEVONORGESTREL / ETHINYL ESTRADIOL AND ETHINYL ESTRADIOL 150-30(84)
1 KIT ORAL DAILY
Qty: 91 TAB | Refills: 3 | Status: SHIPPED | OUTPATIENT
Start: 2019-11-19 | End: 2019-11-19

## 2019-11-19 NOTE — TELEPHONE ENCOUNTER
DOCUMENTATION OF PAR STATUS:    1. Name of Medication & Dose: Levonorgest-Eth Estrad 91-Day 0.15-0.03 &0.01 MG Tab          2. Name of Prescription Coverage Company & phone #: Medicaid    3. Date Prior Auth Submitted: 11/9/19    4. What information was given to obtain insurance decision? All information on menstrual issues    5. Prior Auth Status? Denied    6. Patient Notified: no    Insurance will not cover this medication, see denial letter in media for covered alternatives

## 2019-11-26 ENCOUNTER — TELEPHONE (OUTPATIENT)
Dept: MEDICAL GROUP | Facility: CLINIC | Age: 39
End: 2019-11-26

## 2019-11-26 DIAGNOSIS — N92.0 MENORRHAGIA WITH REGULAR CYCLE: ICD-10-CM

## 2019-11-26 RX ORDER — LEVONORGESTREL AND ETHINYL ESTRADIOL 0.15-0.03
1 KIT ORAL DAILY
Qty: 84 TAB | Refills: 1 | Status: SHIPPED | OUTPATIENT
Start: 2019-11-26 | End: 2019-12-19

## 2019-11-26 NOTE — TELEPHONE ENCOUNTER
Pt. LVM stating that insurance will not approve levonorgestrel-ethinyl estradiol (SEASONALE) 0.15-0.03 MG per tablet [[id number]]     Called and spoke with pt. Her insurance will approve 3 different medications:   Rafat Hernandez    She would like to try one of these three.    Updated her pharmacy to [address] As requested from patient.

## 2019-11-26 NOTE — TELEPHONE ENCOUNTER
Phone Number Called: [phone] (home)     Call outcome: spoke to patient regarding message below    Message: Pt. Informed. Instructed to call pharmacy first prior to driving down there and to call if she has any other issues with meds.

## 2019-11-26 NOTE — TELEPHONE ENCOUNTER
I have sent the generic of the name brands requested. I'm not sure why it's not covered as a generic.

## 2019-12-19 ENCOUNTER — OFFICE VISIT (OUTPATIENT)
Dept: MEDICAL GROUP | Facility: CLINIC | Age: 39
End: 2019-12-19
Payer: MEDICAID

## 2019-12-19 VITALS
DIASTOLIC BLOOD PRESSURE: 60 MMHG | TEMPERATURE: 97.7 F | HEART RATE: 72 BPM | OXYGEN SATURATION: 100 % | SYSTOLIC BLOOD PRESSURE: 90 MMHG | HEIGHT: 60 IN | BODY MASS INDEX: 22.19 KG/M2 | WEIGHT: 113 LBS

## 2019-12-19 DIAGNOSIS — F41.9 ANXIETY: ICD-10-CM

## 2019-12-19 DIAGNOSIS — F33.1 MODERATE EPISODE OF RECURRENT MAJOR DEPRESSIVE DISORDER (HCC): ICD-10-CM

## 2019-12-19 DIAGNOSIS — G47.00 INSOMNIA, UNSPECIFIED TYPE: ICD-10-CM

## 2019-12-19 DIAGNOSIS — F99 PSYCHIATRIC DISTURBANCE: ICD-10-CM

## 2019-12-19 DIAGNOSIS — N92.0 MENORRHAGIA WITH REGULAR CYCLE: ICD-10-CM

## 2019-12-19 DIAGNOSIS — N94.10 DYSPAREUNIA IN FEMALE: ICD-10-CM

## 2019-12-19 PROBLEM — Z00.00 ENCOUNTER FOR MEDICAL EXAMINATION TO ESTABLISH CARE: Status: RESOLVED | Noted: 2019-11-12 | Resolved: 2019-12-19

## 2019-12-19 PROBLEM — N94.6 MENSTRUAL PAIN: Status: RESOLVED | Noted: 2019-11-12 | Resolved: 2019-12-19

## 2019-12-19 LAB
INT CON NEG: NEGATIVE
INT CON POS: POSITIVE
POC URINE PREGNANCY TEST: NORMAL

## 2019-12-19 PROCEDURE — 81025 URINE PREGNANCY TEST: CPT | Performed by: PHYSICIAN ASSISTANT

## 2019-12-19 PROCEDURE — 99213 OFFICE O/P EST LOW 20 MIN: CPT | Performed by: PHYSICIAN ASSISTANT

## 2019-12-19 RX ORDER — PAROXETINE 10 MG/1
TABLET, FILM COATED ORAL
Qty: 141 TAB | Refills: 0 | Status: SHIPPED | OUTPATIENT
Start: 2019-12-19 | End: 2020-01-21

## 2019-12-19 RX ORDER — TRAZODONE HYDROCHLORIDE 50 MG/1
50-100 TABLET ORAL
Qty: 180 TAB | Refills: 1 | Status: SHIPPED | OUTPATIENT
Start: 2019-12-19 | End: 2020-01-21

## 2019-12-19 RX ORDER — TOPIRAMATE 100 MG/1
50-100 TABLET, FILM COATED ORAL DAILY
Qty: 90 TAB | Refills: 1 | Status: SHIPPED | OUTPATIENT
Start: 2019-12-19 | End: 2020-01-21

## 2019-12-19 RX ORDER — LEVONORGESTREL AND ETHINYL ESTRADIOL 0.15-0.03
KIT ORAL
COMMUNITY
Start: 2019-11-26 | End: 2019-12-19 | Stop reason: SDUPTHER

## 2019-12-19 RX ORDER — NORGESTIMATE AND ETHINYL ESTRADIOL 7DAYSX3 28
1 KIT ORAL DAILY
Qty: 28 TAB | Refills: 5 | Status: SHIPPED | OUTPATIENT
Start: 2019-12-19 | End: 2019-12-19

## 2019-12-19 RX ORDER — LEVONORGESTREL AND ETHINYL ESTRADIOL 0.15-0.03
1 KIT ORAL DAILY
Qty: 84 TAB | Refills: 1 | Status: SHIPPED | OUTPATIENT
Start: 2019-12-19 | End: 2020-01-21

## 2019-12-19 RX ORDER — TOPIRAMATE 100 MG/1
TABLET, FILM COATED ORAL
COMMUNITY
Start: 2019-10-29 | End: 2019-12-19

## 2019-12-19 NOTE — ASSESSMENT & PLAN NOTE
This is a chronic condition, new to this provider and currently not treated with medication.  This patient has a history of being seen by psychiatry for this problem as well as anxiety and insomnia. She is requesting to restart the previous medication she was on, Paxil as she did not have any adverse side effects with this and felt that it worked very well for her.  She denies suicidal ideas at this time.

## 2019-12-19 NOTE — ASSESSMENT & PLAN NOTE
See depression note.  Severe at this time.  Patient states she has had 3 panic attacks in the past 4 days

## 2019-12-19 NOTE — PROGRESS NOTES
Chief Complaint   Patient presents with   • Follow-Up     6 week follow up. Would like to discuss restarting medications.       HISTORY OF PRESENT ILLNESS: Patient is a 39 y.o. female established patient who presents today for evaluation and management of:    Moderate episode of recurrent major depressive disorder (HCC)  This is a chronic condition, new to this provider and currently not treated with medication.  This patient has a history of being seen by psychiatry for this problem as well as anxiety and insomnia. She is requesting to restart the previous medication she was on, Paxil as she did not have any adverse side effects with this and felt that it worked very well for her.  She denies suicidal ideas at this time.    Menorrhagia with regular cycle  Unable to obtain Seasonique due to insurance issues.  kurvelo is a similar medication with monthly dosing rather than 3-month dosing and was approved by her insurance.  She has been taking this for about 4 days without any adverse side effects thus far and wishes to continue this medication at this time.    Anxiety  See depression note.  Severe at this time.  Patient states she has had 3 panic attacks in the past 4 days       Patient Active Problem List    Diagnosis Date Noted   • Chronic intractable headache 11/12/2019   • Menorrhagia with regular cycle 11/12/2019   • Dyspareunia in female 11/12/2019   • Constipation 11/12/2019   • Moderate episode of recurrent major depressive disorder (HCC) 08/03/2017   • Anxiety 08/08/2016       Allergies:Toradol    Current Outpatient Medications   Medication Sig Dispense Refill   • PARoxetine (PAXIL) 10 MG Tab Take 1 Tab by mouth every day for 7 days, THEN 2 Tabs every day for 7 days, THEN 4 Tabs every day for 30 days. 141 Tab 0   • traZODone (DESYREL) 50 MG Tab Take 1-2 Tabs by mouth every bedtime. 180 Tab 1   • topiramate (TOPAMAX) 100 MG Tab Take 0.5-1 Tabs by mouth every day. 90 Tab 1   • levonorgestrel-ethinyl estradiol  (KURVELO) 0.15-30 MG-MCG per tablet Take 1 Tab by mouth every day. 84 Tab 1     No current facility-administered medications for this visit.        Social History     Tobacco Use   • Smoking status: Current Every Day Smoker     Packs/day: 0.50     Types: Cigarettes   • Smokeless tobacco: Current User   • Tobacco comment: Vape daily    Substance Use Topics   • Alcohol use: No     Alcohol/week: 0.0 oz   • Drug use: No       Family Status   Relation Name Status   • Mo  Alive   • Fa  Alive   • MGFa     • Child  Alive   • Child  Alive     Family History   Problem Relation Age of Onset   • Stroke Maternal Grandfather        Review of Systems: See HPI above.   Constitutional: Negative for fever, chills, weight loss.   HENT: Negative for ear pain, nosebleeds, congestion, sore throat and neck pain.    Eyes: Negative for blurred vision.   Respiratory: Negative for shortness of breath  Cardiovascular: Negative for chest pain,   Gastrointestinal: Negative for heartburn, nausea, vomiting and abdominal pain.   Genitourinary: Negative for dysuria, urgency and frequency.   Musculoskeletal: Negative for myalgias, back pain and joint pain.   Skin: Negative for rash and itching.   Neurological: Negative for dizziness, tingling, tremors, sensory change, focal weakness and headaches.   Endo/Heme/Allergies: Does not bruise/bleed easily.   Psychiatric/Behavioral: Positive for depression without suicidal ideas and memory loss.  The patient is nervous/anxious and does have severe insomnia.      Exam:  BP (!) 90/60   Pulse 72   Temp 36.5 °C (97.7 °F)   Ht 1.524 m (5')   Wt 51.3 kg (113 lb)   SpO2 100%   Body mass index is 22.07 kg/m².  General:  Healthy-Appearing female in NAD  Head: is grossly normal.  Neck: Supple without masses. Thyroid is not visibly enlarged.  Pulmonary: Clear to ausculation. Normal effort. No rales, ronchi, or wheezing.  Cardiovascular: Regular rate and rhythm without murmur. Carotid pulses are intact and  equal bilaterally.  Extremities: no clubbing, cyanosis, or edema.  Behavioral: Patient is somewhat tearful, shaky and anxious during history taking today.  She has normal affect with somewhat depressed mood.    Medical decision-making and discussion:  1. Psychiatric disturbance    - PARoxetine (PAXIL) 10 MG Tab; Take 1 Tab by mouth every day for 7 days, THEN 2 Tabs every day for 7 days, THEN 4 Tabs every day for 30 days.  Dispense: 141 Tab; Refill: 0  - topiramate (TOPAMAX) 100 MG Tab; Take 0.5-1 Tabs by mouth every day.  Dispense: 90 Tab; Refill: 1    2. Anxiety    - traZODone (DESYREL) 50 MG Tab; Take 1-2 Tabs by mouth every bedtime.  Dispense: 180 Tab; Refill: 1    3. Moderate episode of recurrent major depressive disorder (HCC)    - traZODone (DESYREL) 50 MG Tab; Take 1-2 Tabs by mouth every bedtime.  Dispense: 180 Tab; Refill: 1    4. Insomnia, unspecified type    - traZODone (DESYREL) 50 MG Tab; Take 1-2 Tabs by mouth every bedtime.  Dispense: 180 Tab; Refill: 1    5. Menorrhagia with regular cycle    - POCT Pregnancy  - levonorgestrel-ethinyl estradiol (KURVELO) 0.15-30 MG-MCG per tablet; Take 1 Tab by mouth every day.  Dispense: 84 Tab; Refill: 1    6. Dyspareunia in female    - POCT Pregnancy      Please note that this dictation was created using voice recognition software. I have made every reasonable attempt to correct obvious errors, but I expect that there are errors of grammar and possibly content that I did not discover before finalizing the note.      Return in about 4 weeks (around 1/16/2020) for Depression.

## 2019-12-19 NOTE — ASSESSMENT & PLAN NOTE
Unable to obtain Seasonique due to insurance issues.  kurvelo is a similar medication with monthly dosing rather than 3-month dosing and was approved by her insurance.  She has been taking this for about 4 days without any adverse side effects thus far and wishes to continue this medication at this time.

## 2019-12-21 ENCOUNTER — PATIENT MESSAGE (OUTPATIENT)
Dept: MEDICAL GROUP | Facility: CLINIC | Age: 39
End: 2019-12-21

## 2019-12-23 NOTE — TELEPHONE ENCOUNTER
From: Danielle Barcenas  To: Gayathri Rivas P.A.-C.  Sent: 12/21/2019 7:40 PM PST  Subject: Prescription Question    I had an appointment with Dr. Rivas last week and my pharmacy did not receive one of the perscriptions she was sending over to treat my depression issues. Also I would like to try a different birth control then the kurvelo that I am currently using.  Thank you

## 2020-01-02 ENCOUNTER — APPOINTMENT (OUTPATIENT)
Dept: RADIOLOGY | Facility: MEDICAL CENTER | Age: 40
End: 2020-01-02
Attending: PHYSICIAN ASSISTANT
Payer: MEDICAID

## 2020-01-21 ENCOUNTER — OFFICE VISIT (OUTPATIENT)
Dept: MEDICAL GROUP | Facility: MEDICAL CENTER | Age: 40
End: 2020-01-21
Attending: INTERNAL MEDICINE
Payer: MEDICAID

## 2020-01-21 ENCOUNTER — PATIENT MESSAGE (OUTPATIENT)
Dept: MEDICAL GROUP | Facility: MEDICAL CENTER | Age: 40
End: 2020-01-21

## 2020-01-21 VITALS
DIASTOLIC BLOOD PRESSURE: 68 MMHG | HEIGHT: 60 IN | TEMPERATURE: 99 F | BODY MASS INDEX: 22.38 KG/M2 | OXYGEN SATURATION: 94 % | RESPIRATION RATE: 16 BRPM | WEIGHT: 114 LBS | HEART RATE: 68 BPM | SYSTOLIC BLOOD PRESSURE: 100 MMHG

## 2020-01-21 DIAGNOSIS — G25.81 RESTLESS LEG SYNDROME: ICD-10-CM

## 2020-01-21 DIAGNOSIS — G89.29 CHRONIC PELVIC PAIN IN FEMALE: ICD-10-CM

## 2020-01-21 DIAGNOSIS — K59.00 CONSTIPATION, UNSPECIFIED CONSTIPATION TYPE: ICD-10-CM

## 2020-01-21 DIAGNOSIS — N94.10 DYSPAREUNIA IN FEMALE: ICD-10-CM

## 2020-01-21 DIAGNOSIS — F41.9 ANXIETY: ICD-10-CM

## 2020-01-21 DIAGNOSIS — F51.01 PRIMARY INSOMNIA: ICD-10-CM

## 2020-01-21 DIAGNOSIS — R10.2 CHRONIC PELVIC PAIN IN FEMALE: ICD-10-CM

## 2020-01-21 DIAGNOSIS — Z30.016 ENCOUNTER FOR INITIAL PRESCRIPTION OF TRANSDERMAL PATCH HORMONAL CONTRACEPTIVE DEVICE: ICD-10-CM

## 2020-01-21 DIAGNOSIS — G43.019 INTRACTABLE MIGRAINE WITHOUT AURA AND WITHOUT STATUS MIGRAINOSUS: ICD-10-CM

## 2020-01-21 DIAGNOSIS — F33.1 MODERATE EPISODE OF RECURRENT MAJOR DEPRESSIVE DISORDER (HCC): ICD-10-CM

## 2020-01-21 PROBLEM — R51.9 CHRONIC INTRACTABLE HEADACHE: Status: RESOLVED | Noted: 2019-11-12 | Resolved: 2020-01-21

## 2020-01-21 PROCEDURE — 99213 OFFICE O/P EST LOW 20 MIN: CPT | Performed by: INTERNAL MEDICINE

## 2020-01-21 PROCEDURE — 99204 OFFICE O/P NEW MOD 45 MIN: CPT | Performed by: INTERNAL MEDICINE

## 2020-01-21 RX ORDER — IBUPROFEN 600 MG/1
600 TABLET ORAL EVERY 8 HOURS PRN
Qty: 30 TAB | Refills: 3 | Status: SHIPPED | OUTPATIENT
Start: 2020-01-21 | End: 2020-09-09 | Stop reason: SDUPTHER

## 2020-01-21 RX ORDER — POLYETHYLENE GLYCOL 3350 17 G/17G
17 POWDER, FOR SOLUTION ORAL DAILY
Qty: 1 BOTTLE | Refills: 3 | Status: SHIPPED | OUTPATIENT
Start: 2020-01-21 | End: 2020-04-22

## 2020-01-21 RX ORDER — FLUOXETINE 20 MG/1
TABLET, FILM COATED ORAL
Qty: 30 TAB | Refills: 1 | Status: SHIPPED | OUTPATIENT
Start: 2020-01-21 | End: 2020-01-21

## 2020-01-21 RX ORDER — M-VIT,TX,IRON,MINS/CALC/FOLIC 27MG-0.4MG
1 TABLET ORAL DAILY
COMMUNITY
End: 2020-04-22

## 2020-01-21 RX ORDER — FLUOXETINE HYDROCHLORIDE 20 MG/1
CAPSULE ORAL
Qty: 30 CAP | Refills: 1 | Status: SHIPPED | OUTPATIENT
Start: 2020-01-21 | End: 2020-04-22

## 2020-01-21 RX ORDER — TOPIRAMATE 100 MG/1
50 TABLET, FILM COATED ORAL DAILY
Qty: 90 TAB | Refills: 1 | Status: SHIPPED | DISCHARGE
Start: 2020-01-21 | End: 2021-02-05

## 2020-01-21 RX ORDER — NORELGESTROMIN AND ETHINYL ESTRADIOL 35; 150 UG/MG; UG/MG
1 PATCH TRANSDERMAL
Qty: 4 PATCH | Refills: 3 | Status: SHIPPED | OUTPATIENT
Start: 2020-01-21 | End: 2020-02-19

## 2020-01-21 RX ORDER — FLUOXETINE 10 MG/1
CAPSULE ORAL
Qty: 7 CAP | Refills: 0 | Status: SHIPPED | OUTPATIENT
Start: 2020-01-21 | End: 2020-02-19

## 2020-01-21 RX ORDER — GABAPENTIN 300 MG/1
300 CAPSULE ORAL NIGHTLY PRN
Qty: 30 CAP | Refills: 2 | Status: SHIPPED | OUTPATIENT
Start: 2020-01-21 | End: 2020-02-19

## 2020-01-21 ASSESSMENT — PATIENT HEALTH QUESTIONNAIRE - PHQ9
7. TROUBLE CONCENTRATING ON THINGS, SUCH AS READING THE NEWSPAPER OR WATCHING TELEVISION: NEARLY EVERY DAY
8. MOVING OR SPEAKING SO SLOWLY THAT OTHER PEOPLE COULD HAVE NOTICED. OR THE OPPOSITE, BEING SO FIGETY OR RESTLESS THAT YOU HAVE BEEN MOVING AROUND A LOT MORE THAN USUAL: NEARLY EVERY DAY
SUM OF ALL RESPONSES TO PHQ9 QUESTIONS 1 AND 2: 6
5. POOR APPETITE OR OVEREATING: NOT AT ALL
SUM OF ALL RESPONSES TO PHQ QUESTIONS 1-9: 17
2. FEELING DOWN, DEPRESSED, IRRITABLE, OR HOPELESS: NEARLY EVERY DAY
9. THOUGHTS THAT YOU WOULD BE BETTER OFF DEAD, OR OF HURTING YOURSELF: NOT AT ALL
3. TROUBLE FALLING OR STAYING ASLEEP OR SLEEPING TOO MUCH: NEARLY EVERY DAY
4. FEELING TIRED OR HAVING LITTLE ENERGY: MORE THAN HALF THE DAYS
1. LITTLE INTEREST OR PLEASURE IN DOING THINGS: NEARLY EVERY DAY
6. FEELING BAD ABOUT YOURSELF - OR THAT YOU ARE A FAILURE OR HAVE LET YOURSELF OR YOUR FAMILY DOWN: NOT AL ALL

## 2020-01-21 ASSESSMENT — PAIN SCALES - GENERAL: PAINLEVEL: 6=MODERATE PAIN

## 2020-01-21 NOTE — ASSESSMENT & PLAN NOTE
Reports significant difficulty sleeping at night.  She was started on trazodone by her last PCP however states she has had very bad nightmares when taking this.  She does sleep 8 hours a night with the trazodone but the nightmares are really bothersome to her.  In the past she was on Seroquel which she states was helpful but made her too sedated and now that she has an 8-month-old daughter she does not want to be on this medication.  She also reports a lot of difficulty stopping it in the past.  She is tried melatonin as well as sleepy time teas without improvement.  She occasionally takes Benadryl for her allergies but reports worsening of her restless leg symptoms on this.  She is trying to avoid any medication that would cause weight gain.

## 2020-01-21 NOTE — ASSESSMENT & PLAN NOTE
She is interested in starting on birth control.  She was taking OCPs but felt like this was causing her to gain weight so she stopped.  She reports heavy menses and painful menses.  In the past she has used the NuvaRing.  When she tried this again she started having a lot of cramping.  She is interested in an alternative method for birth control.  She would like to try the patch and she is open to Nexplanon but she is not interested in an IUD.  She is not interested in Depo-Provera due to weight gain side effect.

## 2020-01-21 NOTE — ASSESSMENT & PLAN NOTE
Reports that since she had her last child 8 months ago, sex has been very painful for her.  She states that her  was complicated and she had to have more extensive suturing of the uterus potentially to the abdominal wall because she was having so much bleeding.

## 2020-01-21 NOTE — ASSESSMENT & PLAN NOTE
She reports a daily migraine headaches.  States she has had daily headaches for several years now.  Currently, she is taking 50 mg of Topamax which she says prevents her from getting the significant photophobia, nausea, and vomiting which she would otherwise get with these headaches.  She is also been taking Advil 400 mg in the morning for the past 6 months.  In the past she has done better with ibuprofen 600 mg and is requesting a prescription for this.  She has never seen neurology.  Recently had her eyes checked and did need glasses.  Has not started wearing these yet.  Denies any changes in hearing, speech, numbness, weakness, tingling throughout her body.

## 2020-01-21 NOTE — PROGRESS NOTES
Danielle Barcenas is a 39 y.o. female here for depression, anxiety, headaches, abdominal pain, difficulty sleeping, chronic medical issues as below, establish care  HPI: Previous PCP was Mora Rivas in [place]    Primary insomnia  Reports significant difficulty sleeping at night.  She was started on trazodone by her last PCP however states she has had very bad nightmares when taking this.  She does sleep 8 hours a night with the trazodone but the nightmares are really bothersome to her.  In the past she was on Seroquel which she states was helpful but made her too sedated and now that she has an 8-month-old daughter she does not want to be on this medication.  She also reports a lot of difficulty stopping it in the past.  She is tried melatonin as well as sleepy time teas without improvement.  She occasionally takes Benadryl for her allergies but reports worsening of her restless leg symptoms on this.  She is trying to avoid any medication that would cause weight gain.     Moderate episode of recurrent major depressive disorder (HCC)  Reports struggling with anxiety and depression her whole life.  She has seen multiple psychiatrists in the past.  The last was in July 2019.  Most recently, she has taken Paxil which had previously been helpful for her however she states that this time around she felt it made her headaches worse so she stopped it after about a month.  She has also been on Lexapro, unsure if it was helpful.  She has taken Zoloft which caused significant nausea and vomiting.  When she was 18 to 22 years old she took Prozac and felt that this was helpful.  She is interested in seeing a therapist as well.  She denies suicidal ideation.      Intractable migraine without aura and without status migrainosus  She reports a daily migraine headaches.  States she has had daily headaches for several years now.  Currently, she is taking 50 mg of Topamax which she says prevents her from getting the  significant photophobia, nausea, and vomiting which she would otherwise get with these headaches.  She is also been taking Advil 400 mg in the morning for the past 6 months.  In the past she has done better with ibuprofen 600 mg and is requesting a prescription for this.  She has never seen neurology.  Recently had her eyes checked and did need glasses.  Has not started wearing these yet.  Denies any changes in hearing, speech, numbness, weakness, tingling throughout her body.      Restless leg syndrome  She reports that with both of her pregnancies, she had significant restless leg symptoms.  Denies anemia during the pregnancies.  She had her daughter 8 months ago and symptoms have persisted.  States that when she lays down she feels like she has to move her legs otherwise she has a very uncomfortable sensation.  She denies any pain in the legs.  This does keep her from sleeping at night.  Denies any skin rashes.  Does not currently take any medication for this.  Noted it did get worse with the Topamax and the trazodone.  She started taking the Topamax in the mornings which has helped.    Chronic pelvic pain in female  She reports that since the delivery of her daughter 8 months ago, she has had chronic pelvic pain.  She felt the pain immediately after the  especially above her  scar and more on the right.  She was told she needed to have sutures placed and they were on the right.  It is unclear exactly what happened during her  procedure but it sounds like it was a complicated procedure with quite a bit of blood loss.  We do not have records for review.  States that since this time her menstrual cycles have also been very painful.      Constipation  Reports constipation for the past several years.  She currently uses milk of magnesia about once a week but it causes a lot of bloating and cramping.  She does drink plenty of water but does not eat a lot of fiber.  She has taken a stool  softener daily in the past but did not notice that it worked very well.  She denies melena, hematochezia.      Dyspareunia in female  Reports that since she had her last child 8 months ago, sex has been very painful for her.  She states that her  was complicated and she had to have more extensive suturing of the uterus potentially to the abdominal wall because she was having so much bleeding.      Anxiety  She reports a longstanding history of anxiety.  States it mostly affects her at night and keeps her from falling asleep.  See discussion of depression above.    Encounter for initial prescription of transdermal patch hormonal contraceptive device  She is interested in starting on birth control.  She was taking OCPs but felt like this was causing her to gain weight so she stopped.  She reports heavy menses and painful menses.  In the past she has used the NuvaRing.  When she tried this again she started having a lot of cramping.  She is interested in an alternative method for birth control.  She would like to try the patch and she is open to Nexplanon but she is not interested in an IUD.  She is not interested in Depo-Provera due to weight gain side effect.    Current medicines (including changes today)  Current Outpatient Medications   Medication Sig Dispense Refill   • topiramate (TOPAMAX) 100 MG Tab Take 0.5 Tabs by mouth every day. 90 Tab 1   • therapeutic multivitamin-minerals (THERAGRAN-M) Tab Take 1 Tab by mouth every day.     • fluoxetine (PROZAC) 20 MG tablet Take 1/2 tab daily for 1 week then increase to 1 tab daily 30 Tab 1   • ibuprofen (MOTRIN) 600 MG Tab Take 1 Tab by mouth every 8 hours as needed for Moderate Pain or Headache. 30 Tab 3   • gabapentin (NEURONTIN) 300 MG Cap Take 1 Cap by mouth at bedtime as needed (insomnia). 30 Cap 2   • norelgestromin-ethinyl estradiol (ORTHO EVRA) 150-35 MCG/24HR patch Apply 1 Patch to skin as directed every 7 days. 4 Patch 3   • polyethylene glycol 3350  (MIRALAX) Powder Take 17 g by mouth every day. 1 Bottle 3     No current facility-administered medications for this visit.      She  has a past medical history of Anxiety, Depression, Insomnia, and Migraine.  She  has a past surgical history that includes wrist orif (Left); other orthopedic surgery; primary c section; and ankle orif (Left).  Social History     Tobacco Use   • Smoking status: Never Smoker   • Smokeless tobacco: Never Used   Substance Use Topics   • Alcohol use: No     Alcohol/week: 0.0 oz   • Drug use: No     Social History     Patient does not qualify to have social determinant information on file (likely too young).   Social History Narrative    ** Merged History Encounter **          Family History   Problem Relation Age of Onset   • Hyperlipidemia Mother    • Hypertension Father    • Heart Disease Father         valve problem   • Stroke Maternal Grandfather    • Diabetes Maternal Grandfather    • Cancer Brother         lymphoma   • Stroke Maternal Grandmother          ROS  As above in HPI  All other systems reviewed and are negative     Objective:     Vitals:    20 0952   BP: 100/68   Pulse: 68   Resp: 16   Temp: 37.2 °C (99 °F)   SpO2: 94%     Body mass index is 22.26 kg/m².  Physical Exam:    Constitutional: Alert, no distress.  Skin: Warm, dry, good turgor, no rashes in visible areas.  Eye: Equal, round and reactive, conjunctiva clear, lids normal.  ENMT: Lips without lesions, good dentition, oropharynx clear, TM's clear bilaterally.  Neck: Trachea midline, no masses, no thyromegaly. No cervical or supraclavicular lymphadenopathy.  Respiratory: Unlabored respiratory effort, lungs clear to auscultation, no wheezes, no ronchi.  Cardiovascular: Regular rate and rhythm, no murmurs appreciated, no lower extremity edema.  Abdomen: Soft, mild to moderate tenderness to palpation over lower abdomen just above  scar without rebound or guarding, no masses, no hepatosplenomegaly.  Psych:  Alert and oriented x3, normal affect and mood.        Assessment and Plan:   The following treatment plan was discussed    1. Primary insomnia  Uncontrolled.  Trazodone has caused worsening of the restless leg symptoms and nightmares.  Seroquel has been too sedating.  Reluctant to start her on Remeron given weight gain is side effect.  We will try her on gabapentin given comorbid restless leg symptoms  - REFERRAL TO PSYCHIATRY  - REFERRAL TO PSYCHOLOGY  - gabapentin (NEURONTIN) 300 MG Cap; Take 1 Cap by mouth at bedtime as needed (insomnia).  Dispense: 30 Cap; Refill: 2  -Follow-up 4 to 5 weeks with up titration if needed    2. Moderate episode of recurrent major depressive disorder (HCC)  Uncontrolled, side effects with Paxil this time around.  Prozac has been helpful in the past and we will restart it for her.  New referrals placed to psychiatry and therapy at her request.  - REFERRAL TO PSYCHIATRY  - REFERRAL TO PSYCHOLOGY  - fluoxetine (PROZAC) 20 MG tablet; Take 1/2 tab daily for 1 week then increase to 1 tab daily  Dispense: 30 Tab; Refill: 1    3. Anxiety  As discussed above  - REFERRAL TO PSYCHIATRY  - REFERRAL TO PSYCHOLOGY  - fluoxetine (PROZAC) 20 MG tablet; Take 1/2 tab daily for 1 week then increase to 1 tab daily  Dispense: 30 Tab; Refill: 1    4. Intractable migraine without aura and without status migrainosus  Uncontrolled.  We will continue the Topamax however also refer her to neurology given chronicity and failure to respond to prophylactic and abortive therapy.  - topiramate (TOPAMAX) 100 MG Tab; Take 0.5 Tabs by mouth every day.  Dispense: 90 Tab; Refill: 1  - ibuprofen (MOTRIN) 600 MG Tab; Take 1 Tab by mouth every 8 hours as needed for Moderate Pain or Headache.  Dispense: 30 Tab; Refill: 3  - REFERRAL TO NEUROLOGY    5. Restless leg syndrome  Uncontrolled.  We will check iron studies and also start gabapentin  - FERRITIN; Future  - IRON/TOTAL IRON BIND; Future  - CBC WITHOUT DIFFERENTIAL;  Future  - gabapentin (NEURONTIN) 300 MG Cap; Take 1 Cap by mouth at bedtime as needed (insomnia).  Dispense: 30 Cap; Refill: 2  - TSH WITH REFLEX TO FT4; Future    6. Chronic pelvic pain in female  Uncontrolled.  She is wishing to see gynecology and I have placed a referral today  - REFERRAL TO GYNECOLOGY    7. Dyspareunia in female  - REFERRAL TO GYNECOLOGY    8. Constipation, unspecified constipation type  Uncontrolled.  We will start her on MiraLAX as needed.  She can continue milk of magnesia if symptoms are severe.  We discussed increasing fiber intake and she will continue with her good hydration  - polyethylene glycol 3350 (MIRALAX) Powder; Take 17 g by mouth every day.  Dispense: 1 Bottle; Refill: 3    9. Encounter for initial prescription of transdermal patch hormonal contraceptive device  - norelgestromin-ethinyl estradiol (ORTHO EVRA) 150-35 MCG/24HR patch; Apply 1 Patch to skin as directed every 7 days.  Dispense: 4 Patch; Refill: 3        Followup: Return in about 5 weeks (around 2/25/2020), or if symptoms worsen or fail to improve, for depression, anxiety, RLS.

## 2020-01-21 NOTE — ASSESSMENT & PLAN NOTE
She reports a longstanding history of anxiety.  States it mostly affects her at night and keeps her from falling asleep.  See discussion of depression above.

## 2020-01-21 NOTE — ASSESSMENT & PLAN NOTE
Reports struggling with anxiety and depression her whole life.  She has seen multiple psychiatrists in the past.  The last was in July 2019.  Most recently, she has taken Paxil which had previously been helpful for her however she states that this time around she felt it made her headaches worse so she stopped it after about a month.  She has also been on Lexapro, unsure if it was helpful.  She has taken Zoloft which caused significant nausea and vomiting.  When she was 18 to 22 years old she took Prozac and felt that this was helpful.  She is interested in seeing a therapist as well.  She denies suicidal ideation.

## 2020-01-21 NOTE — ASSESSMENT & PLAN NOTE
Reports constipation for the past several years.  She currently uses milk of magnesia about once a week but it causes a lot of bloating and cramping.  She does drink plenty of water but does not eat a lot of fiber.  She has taken a stool softener daily in the past but did not notice that it worked very well.  She denies melena, hematochezia.

## 2020-01-21 NOTE — ASSESSMENT & PLAN NOTE
She reports that with both of her pregnancies, she had significant restless leg symptoms.  Denies anemia during the pregnancies.  She had her daughter 8 months ago and symptoms have persisted.  States that when she lays down she feels like she has to move her legs otherwise she has a very uncomfortable sensation.  She denies any pain in the legs.  This does keep her from sleeping at night.  Denies any skin rashes.  Does not currently take any medication for this.  Noted it did get worse with the Topamax and the trazodone.  She started taking the Topamax in the mornings which has helped.

## 2020-01-21 NOTE — ASSESSMENT & PLAN NOTE
She reports that since the delivery of her daughter 8 months ago, she has had chronic pelvic pain.  She felt the pain immediately after the  especially above her  scar and more on the right.  She was told she needed to have sutures placed and they were on the right.  It is unclear exactly what happened during her  procedure but it sounds like it was a complicated procedure with quite a bit of blood loss.  We do not have records for review.  States that since this time her menstrual cycles have also been very painful.

## 2020-01-22 NOTE — PATIENT COMMUNICATION
MEDICATION PRIOR AUTHORIZATION NEEDED:    1. Name of Medication: fluoxetine    2. Requested By (Name of Pharmacy): [facility]      3. Is insurance on file current? yes    4. What is the name & phone number of the 3rd party payor? [facility]     Pharmacy has indicated that this medication requires a prior auth,  [facility] will cover capsules.   Continue with prior?     MEDICATION PRIOR AUTHORIZATION NEEDED:    1. Name of Medication:ORTHO EVRA    2. Requested By (Name of Pharmacy): [facility]      3. Is insurance on file current? yes    4. What is the name & phone number of the 3rd party payor? [employer]

## 2020-01-22 NOTE — TELEPHONE ENCOUNTER
From: Danielle Barcenas  To: Josefina De Leon M.D.  Sent: 1/21/2020 12:32 PM PST  Subject: Prescription Question    Hi there, so Janny contacted me and said my insurance is having an issue with the Prozac and the Birth control. They only want to cover a capsule. Can we just start me out at a lower dose so I won't need to cut it in half? And is there another brand of the Birth control that medicaid will cover?     Thanks,    Danielle Barcenas

## 2020-01-23 ENCOUNTER — GYNECOLOGY VISIT (OUTPATIENT)
Dept: OBGYN | Facility: CLINIC | Age: 40
End: 2020-01-23

## 2020-01-23 VITALS — WEIGHT: 113 LBS | SYSTOLIC BLOOD PRESSURE: 122 MMHG | DIASTOLIC BLOOD PRESSURE: 68 MMHG | BODY MASS INDEX: 22.07 KG/M2

## 2020-01-23 DIAGNOSIS — N92.6 MISSED MENSES: ICD-10-CM

## 2020-01-23 DIAGNOSIS — F19.10 POLYSUBSTANCE ABUSE (HCC): ICD-10-CM

## 2020-01-23 DIAGNOSIS — R10.2 PELVIC PAIN: ICD-10-CM

## 2020-01-23 DIAGNOSIS — Z01.411 ENCOUNTER FOR GYNECOLOGICAL EXAMINATION WITH ABNORMAL FINDING: ICD-10-CM

## 2020-01-23 LAB
INT CON NEG: NEGATIVE
INT CON POS: POSITIVE
POC URINE PREGNANCY TEST: NEGATIVE

## 2020-01-23 NOTE — PROGRESS NOTES
Danielle Barcenas is a 39 y.o. female who presents for her Gynecologic Exam.         HPI Comments: Pt presents for well woman exam.  Patient's last menstrual period was 2019. She reports irregular menses. No clots or heavy bleeding.  She was seen by PCP two days ago and provided patch which she has not started. She reprots immediately after birth, she used Nuvaring for birth control. She was then switched to the pill 3 months ago. She states that she continued with pain and bleeding with all of these methods. Does not desire IUD but would be okay with Nexplanon. Concerned about pregnancy today.  Does not desire conception in the next year.  Patient does not report leaking of urine.    Pt has complaints of abdominal and pelvic pain that started immediately following the birth of her daughter via cesearan in May 2019. Her  was complicated by postpartum hemorrhage which she also had with her first child. She reports that she was told by her OB that a special suture was added to stop the bleeding. She did not require blood transfusion.  Patient was unable to stand upright for 4 weeks following her surgery and notes that she did not experience this much pain after the birth of her first child via .  Pain is most notable in her lower abdomen on the left side near her surgical incision. Pain worsens with the application of heat to the incisional site but is alleviated with the use of ibuprofen. Patient would rate her abdominal pain 7-8/10.     Last intercourse 2020. She does report pain with intercourse. Pt describes pain as a sharp stabbing that is worse when her legs are bent compared to when they are straight. She would rate this pain as a 7/10. Pain goes away immediately with cessation of intercourse. She reports no new sexual partner in the last 5 years.  Patient reports frequent constipation, uses milk of magnesium once weekly to obtain bowl movement every other day.  She reports  decreased sexual desire but feels that this could be related to anticipation of pain. Started neurontin a few days ago.     History of breast biopsy, has mammogram scheduled in 1 week.     Review of Systems   Pertinent positives documented in HPI and all other systems reviewed & are negative    All PMH, PSH, allergies, social history and FH reviewed and updated today:  Past Medical History:   Diagnosis Date   • Anxiety    • Depression    • Insomnia    • Migraine      Past Surgical History:   Procedure Laterality Date   • ANKLE ORIF Left    • OTHER ORTHOPEDIC SURGERY     • PRIMARY C SECTION      x2   • WRIST ORIF Left     orif 2014 after horse riding accident     Toradol  Social History     Socioeconomic History   • Marital status: Single     Spouse name: Not on file   • Number of children: Not on file   • Years of education: Not on file   • Highest education level: Not on file   Occupational History   • Not on file   Social Needs   • Financial resource strain: Not on file   • Food insecurity:     Worry: Not on file     Inability: Not on file   • Transportation needs:     Medical: Not on file     Non-medical: Not on file   Tobacco Use   • Smoking status: Never Smoker   • Smokeless tobacco: Never Used   Substance and Sexual Activity   • Alcohol use: No     Alcohol/week: 0.0 oz   • Drug use: No   • Sexual activity: Yes     Partners: Male   Lifestyle   • Physical activity:     Days per week: Not on file     Minutes per session: Not on file   • Stress: Not on file   Relationships   • Social connections:     Talks on phone: Not on file     Gets together: Not on file     Attends [employer] service: Not on file     Active member of club or organization: Not on file     Attends meetings of clubs or organizations: Not on file     Relationship status: Not on file   • Intimate partner violence:     Fear of current or ex partner: Not on file     Emotionally abused: Not on file     Physically abused: Not on file     Forced sexual  activity: Not on file   Other Topics Concern   • Not on file   Social History Narrative    ** Merged History Encounter **          Family History   Problem Relation Age of Onset   • Hyperlipidemia Mother    • Hypertension Father    • Heart Disease Father         valve problem   • Stroke Maternal Grandfather    • Diabetes Maternal Grandfather    • Cancer Brother         lymphoma   • Stroke Maternal Grandmother      Medications:   Current Outpatient Medications Ordered in Epic   Medication Sig Dispense Refill   • topiramate (TOPAMAX) 100 MG Tab Take 0.5 Tabs by mouth every day. 90 Tab 1   • therapeutic multivitamin-minerals (THERAGRAN-M) Tab Take 1 Tab by mouth every day.     • ibuprofen (MOTRIN) 600 MG Tab Take 1 Tab by mouth every 8 hours as needed for Moderate Pain or Headache. 30 Tab 3   • gabapentin (NEURONTIN) 300 MG Cap Take 1 Cap by mouth at bedtime as needed (insomnia). 30 Cap 2   • FLUoxetine (PROZAC) 20 MG Cap Take 1 cap daily after completing 1 week of 10 mg cap once daily 30 Cap 1   • norelgestromin-ethinyl estradiol (ORTHO EVRA) 150-35 MCG/24HR patch Apply 1 Patch to skin as directed every 7 days. (Patient not taking: Reported on 1/23/2020) 4 Patch 3   • polyethylene glycol 3350 (MIRALAX) Powder Take 17 g by mouth every day. (Patient not taking: Reported on 1/23/2020) 1 Bottle 3   • FLUoxetine (PROZAC) 10 MG Cap Take 1 cap daily for 1 week then increase to 20 mg cap (separate script) (Patient not taking: Reported on 1/23/2020) 7 Cap 0     No current Epic-ordered facility-administered medications on file.           Objective:   Vital measurements:  /68   Wt 51.3 kg (113 lb)   Body mass index is 22.07 kg/m². (Goal BM I>18 <25)    Physical Exam   Nursing note and vitals reviewed.  Constitutional: She is oriented to person, place, and time. She appears well-developed and well-nourished. No distress.     HEENT: Normocephalic and atraumatic. External ears normal. Nose normal. Conjunctivae and EOM are  normal. Pupils are equal, round, and reactive to light. No scleral icterus.     Neck: Normal range of motion. Neck supple. No thyromegaly present.     Pulmonary/Chest: Effort normal and breath sounds clear in all quadrants. No respiratory distress.     Cardiovascular: Regular, rate and rhythm. No JVD.    Abdominal: Soft. Bowel sounds are normal. She exhibits no distension and no mass. Tenderness noted in left lower quadrant. She has does have rebound and guarding with palpation of the abdomen most notably in the left lower quadrant.     Breast: Supple, without skin changes, dimpling. No pain with nipple manipulation. Dense tissue noted through breasts bilaterally.     Genitourinary:  Pelvic exam was performed with patient supine.  External genitalia with no abnormal pigmentation, labial fusion,rash, tenderness, lesion or injury to the labia bilaterally.  Vagina is moist with no lesions, foul discharge, erythema, or bleeding. No foreign body around the vagina or signs of injury. Tenderness noted in the vaginal vault with insertion of sterile spec and bimanual exam. Pt notes a sharp stabbing pain with any manipulation of vaginal vault.     Cervix exhibits motion tenderness, no discharge and no friability.   Uterus is anteverted not deviated, or enlarged, but is tender to palpation  Right adnexum displays no mass, no tenderness and no fullness. Difficult to assess left adnexa related to patient report of pain. No mass noted.  Able to feel  incision line at abdominal wall via palpation. Produces pain for patient. Scar tissue is noted thicker on patient left.    Rectal exam deferred    Musculoskeletal: Normal range of motion. She exhibits no edema and no tenderness.     Lymphadenopathy: She has no cervical or inguinal adenopathy.     Neurological: She is alert and oriented to person, place, and time. She exhibits normal muscle tone.     Skin: Skin is warm and dry. No rash noted. She is not diaphoretic. No  erythema. No pallor. 11cm scar noted to symphysis pubis/ superior mons pubis intersection.     Psychiatric: She has a normal mood and affect. Her behavior is normal. Judgment and thought content normal.             Assessment:     1. Encounter for gynecological examination with abnormal finding     2. Missed menses  POCT Pregnancy   3. Pelvic pain  THINPREP PAP W/HPV AND CTNG    VAGINAL PATHOGENS DNA PANEL    US-PELVIC COMPLETE (TRANSABDOMINAL/TRANSVAGINAL) (COMBO)         Plan:   1. Pap and physical exam performed. Discussed intervals of paps at current age per guidelines. Will notify of results  2. Encouraged general breast awareness and self breast exam if appropriate. Pt currently has yearly mammograms due to dense tissue and lumps noted throughout the breasts bilaterally. Hx of left breast tissue biopsy which was negative. Pt is scheduled for a yearly mammogram next week.   3. Counseling: breast self exam, mammography screening and family planning choices  5. Order provided for transvaginal ultrasound to rule out underlying pathology and cysts. GC/C and vaginal pathogens testing performed to rule out infection.  Will notify of results.   6. Keep follow up with primary care physician on Feb 19, 2020.  7. Records requested from [facility] for operative note.   8.  Following a negative UPT today, she will begin using the contraceptive patch today. Bleeding expectations reviewed with patient as she might start bleeding now or not until menses due. She is aware that she is not protected until second set of patches. Use backup if she desires before.

## 2020-01-23 NOTE — NON-PROVIDER
Pt here for NP annual exam  Pt states has not had period since 12/7/2019.   Pt is also having pelvic pain and pain with intercourse   Good# [phone]  Pharmacy verified

## 2020-01-27 DIAGNOSIS — R10.2 PELVIC PAIN: ICD-10-CM

## 2020-01-27 RX ORDER — METRONIDAZOLE 500 MG/1
500 TABLET ORAL 2 TIMES DAILY
Qty: 14 TAB | Refills: 0 | Status: SHIPPED | OUTPATIENT
Start: 2020-01-27 | End: 2020-04-22

## 2020-01-28 DIAGNOSIS — R10.2 PELVIC PAIN: ICD-10-CM

## 2020-01-28 RX ORDER — ASCORBIC ACID 500 MG
500 TABLET ORAL DAILY
Qty: 30 TAB | Refills: 5 | Status: SHIPPED | OUTPATIENT
Start: 2020-01-28 | End: 2020-03-11 | Stop reason: SDUPTHER

## 2020-01-28 RX ORDER — LANOLIN ALCOHOL/MO/W.PET/CERES
325 CREAM (GRAM) TOPICAL DAILY
Qty: 30 TAB | Refills: 5 | Status: SHIPPED | OUTPATIENT
Start: 2020-01-28 | End: 2020-08-28 | Stop reason: SDUPTHER

## 2020-01-29 ENCOUNTER — HOSPITAL ENCOUNTER (OUTPATIENT)
Dept: RADIOLOGY | Facility: MEDICAL CENTER | Age: 40
End: 2020-01-29
Attending: NURSE PRACTITIONER
Payer: MEDICAID

## 2020-01-29 ENCOUNTER — HOSPITAL ENCOUNTER (OUTPATIENT)
Dept: RADIOLOGY | Facility: MEDICAL CENTER | Age: 40
End: 2020-01-29
Attending: PHYSICIAN ASSISTANT
Payer: MEDICAID

## 2020-01-29 DIAGNOSIS — N63.0 LUMP OR MASS IN BREAST: ICD-10-CM

## 2020-01-29 DIAGNOSIS — R92.8 ABNORMAL FINDING ON BREAST IMAGING: ICD-10-CM

## 2020-01-29 PROCEDURE — G0279 TOMOSYNTHESIS, MAMMO: HCPCS

## 2020-01-29 PROCEDURE — 76642 ULTRASOUND BREAST LIMITED: CPT | Mod: LT

## 2020-01-31 ENCOUNTER — PATIENT MESSAGE (OUTPATIENT)
Dept: OBGYN | Facility: CLINIC | Age: 40
End: 2020-01-31

## 2020-01-31 ENCOUNTER — TELEPHONE (OUTPATIENT)
Dept: OBGYN | Facility: CLINIC | Age: 40
End: 2020-01-31

## 2020-01-31 RX ORDER — METRONIDAZOLE 7.5 MG/G
5 GEL TOPICAL 2 TIMES DAILY
Qty: 5 TUBE | Refills: 0 | Status: SHIPPED | OUTPATIENT
Start: 2020-01-31 | End: 2020-02-05

## 2020-01-31 NOTE — TELEPHONE ENCOUNTER
----- Message from Danielle Barcenas sent at 1/31/2020  1:19 PM PST -----  Regarding: Prescription Question  Contact: [phone]  Hi there, I know you said that I should be taking the antibiotics twice daily but they are making me very sick. Would it be okay if I take them once daily or is it crucial that they be taken two times a day to clear up the infection. Also, you had said to make another appointment with another doctor at the clinic...which doctor and what is that appointment for? I'm sorry I can't remember exactly. There was kind of a lot going on that morning.   Thank you    1/31/20 1521 Spoke with pt and reports nausea and diarrhea with Flagyl.  Consulted with Tanya Gomez and will send Rx for Vaginal gel, and instructions given to pt, she understood and agreed to plan. Adv pt no notes that she needs a follow-up appt, adv pt to have her US and will be notified if follow-up appt is needed. Pt understood and had no other questions or concerns

## 2020-02-05 DIAGNOSIS — F33.1 MODERATE EPISODE OF RECURRENT MAJOR DEPRESSIVE DISORDER (HCC): ICD-10-CM

## 2020-02-05 DIAGNOSIS — G47.00 INSOMNIA, UNSPECIFIED TYPE: ICD-10-CM

## 2020-02-05 DIAGNOSIS — F41.9 ANXIETY: ICD-10-CM

## 2020-02-05 RX ORDER — TRAZODONE HYDROCHLORIDE 50 MG/1
50-100 TABLET ORAL
Qty: 30 TAB | Refills: 3 | Status: SHIPPED | OUTPATIENT
Start: 2020-02-05 | End: 2020-04-22

## 2020-02-05 NOTE — TELEPHONE ENCOUNTER
Received request via: Patient    Was the patient seen in the last year in this department? Yes    Does the patient have an active prescription (recently filled or refills available) for medication(s) requested? No     Patient states she previously stopped medication but would like to start again. Please advise.

## 2020-02-19 ENCOUNTER — OFFICE VISIT (OUTPATIENT)
Dept: MEDICAL GROUP | Facility: MEDICAL CENTER | Age: 40
End: 2020-02-19
Attending: INTERNAL MEDICINE
Payer: MEDICAID

## 2020-02-19 ENCOUNTER — APPOINTMENT (OUTPATIENT)
Dept: RADIOLOGY | Facility: MEDICAL CENTER | Age: 40
End: 2020-02-19
Attending: ADVANCED PRACTICE MIDWIFE
Payer: MEDICAID

## 2020-02-19 VITALS
DIASTOLIC BLOOD PRESSURE: 70 MMHG | RESPIRATION RATE: 16 BRPM | TEMPERATURE: 98.4 F | OXYGEN SATURATION: 16 % | BODY MASS INDEX: 21.01 KG/M2 | SYSTOLIC BLOOD PRESSURE: 98 MMHG | HEART RATE: 70 BPM | HEIGHT: 60 IN | WEIGHT: 107 LBS

## 2020-02-19 DIAGNOSIS — F33.1 MODERATE EPISODE OF RECURRENT MAJOR DEPRESSIVE DISORDER (HCC): ICD-10-CM

## 2020-02-19 DIAGNOSIS — F41.1 GENERALIZED ANXIETY DISORDER WITH PANIC ATTACKS: ICD-10-CM

## 2020-02-19 DIAGNOSIS — F41.0 GENERALIZED ANXIETY DISORDER WITH PANIC ATTACKS: ICD-10-CM

## 2020-02-19 PROBLEM — F19.10 POLYSUBSTANCE ABUSE (HCC): Status: RESOLVED | Noted: 2020-01-23 | Resolved: 2020-02-19

## 2020-02-19 PROCEDURE — 99212 OFFICE O/P EST SF 10 MIN: CPT | Performed by: INTERNAL MEDICINE

## 2020-02-19 PROCEDURE — 99213 OFFICE O/P EST LOW 20 MIN: CPT | Performed by: INTERNAL MEDICINE

## 2020-02-19 ASSESSMENT — PAIN SCALES - GENERAL: PAINLEVEL: NO PAIN
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
## 2020-02-19 NOTE — ASSESSMENT & PLAN NOTE
She reports worsening of her depression for the same reason discussed under anxiety.  She has not noticed much improvement with being on the Prozac although this has been helpful for her in the past.  She is currently taking 20 mg daily and has been at this dose for about 3 weeks.  She denies suicidal ideation.
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
## 2020-02-26 ENCOUNTER — APPOINTMENT (OUTPATIENT)
Dept: RADIOLOGY | Facility: MEDICAL CENTER | Age: 40
End: 2020-02-26
Attending: ADVANCED PRACTICE MIDWIFE
Payer: MEDICAID

## 2020-03-11 ENCOUNTER — HOSPITAL ENCOUNTER (OUTPATIENT)
Dept: RADIOLOGY | Facility: MEDICAL CENTER | Age: 40
End: 2020-03-11
Attending: ADVANCED PRACTICE MIDWIFE
Payer: MEDICAID

## 2020-03-11 DIAGNOSIS — R10.2 PELVIC PAIN: ICD-10-CM

## 2020-03-11 PROCEDURE — 76830 TRANSVAGINAL US NON-OB: CPT

## 2020-03-12 RX ORDER — ASCORBIC ACID 500 MG
500 TABLET ORAL DAILY
Qty: 30 TAB | Refills: 11 | Status: SHIPPED | OUTPATIENT
Start: 2020-03-12 | End: 2022-06-09

## 2020-04-22 ENCOUNTER — TELEMEDICINE (OUTPATIENT)
Dept: MEDICAL GROUP | Facility: MEDICAL CENTER | Age: 40
End: 2020-04-22
Attending: INTERNAL MEDICINE
Payer: MEDICAID

## 2020-04-22 VITALS — HEIGHT: 60 IN | BODY MASS INDEX: 20.9 KG/M2

## 2020-04-22 DIAGNOSIS — J30.2 SEASONAL ALLERGIES: ICD-10-CM

## 2020-04-22 DIAGNOSIS — F51.01 PRIMARY INSOMNIA: ICD-10-CM

## 2020-04-22 DIAGNOSIS — F41.0 GENERALIZED ANXIETY DISORDER WITH PANIC ATTACKS: ICD-10-CM

## 2020-04-22 DIAGNOSIS — F41.1 GENERALIZED ANXIETY DISORDER WITH PANIC ATTACKS: ICD-10-CM

## 2020-04-22 PROBLEM — Z30.016 ENCOUNTER FOR INITIAL PRESCRIPTION OF TRANSDERMAL PATCH HORMONAL CONTRACEPTIVE DEVICE: Status: RESOLVED | Noted: 2019-11-12 | Resolved: 2020-04-22

## 2020-04-22 PROBLEM — G25.81 RESTLESS LEG SYNDROME: Status: RESOLVED | Noted: 2020-01-21 | Resolved: 2020-04-22

## 2020-04-22 PROCEDURE — 99214 OFFICE O/P EST MOD 30 MIN: CPT | Mod: CR,95 | Performed by: INTERNAL MEDICINE

## 2020-04-22 RX ORDER — SODIUM, POTASSIUM,MAG SULFATES 17.5-3.13G
SOLUTION, RECONSTITUTED, ORAL ORAL
COMMUNITY
Start: 2020-03-16 | End: 2020-04-22

## 2020-04-22 RX ORDER — BUSPIRONE HYDROCHLORIDE 10 MG/1
TABLET ORAL
COMMUNITY
Start: 2020-04-03 | End: 2020-04-22

## 2020-04-22 RX ORDER — ESTRADIOL 2 MG/1
TABLET ORAL
COMMUNITY
Start: 2020-03-16 | End: 2020-04-22

## 2020-04-22 RX ORDER — PROMETHAZINE HYDROCHLORIDE 12.5 MG/1
SUPPOSITORY RECTAL
COMMUNITY
Start: 2020-02-23 | End: 2020-04-22

## 2020-04-22 RX ORDER — MIRTAZAPINE 30 MG/1
TABLET, FILM COATED ORAL
COMMUNITY
Start: 2020-03-16 | End: 2020-04-22

## 2020-04-22 RX ORDER — TIZANIDINE 4 MG/1
TABLET ORAL
COMMUNITY
Start: 2020-04-21 | End: 2020-04-22

## 2020-04-22 RX ORDER — AMOXICILLIN AND CLAVULANATE POTASSIUM 875; 125 MG/1; MG/1
TABLET, FILM COATED ORAL
COMMUNITY
Start: 2020-02-16 | End: 2020-04-22

## 2020-04-22 RX ORDER — TRAZODONE HYDROCHLORIDE 100 MG/1
TABLET ORAL
COMMUNITY
Start: 2020-04-14 | End: 2020-04-22

## 2020-04-22 RX ORDER — VENLAFAXINE HYDROCHLORIDE 37.5 MG/1
37.5 CAPSULE, EXTENDED RELEASE ORAL DAILY
Qty: 30 CAP | Refills: 3 | Status: SHIPPED | OUTPATIENT
Start: 2020-04-22 | End: 2020-07-09 | Stop reason: SDUPTHER

## 2020-04-22 RX ORDER — ONDANSETRON 4 MG/1
TABLET, ORALLY DISINTEGRATING ORAL
COMMUNITY
Start: 2020-03-09 | End: 2020-04-22

## 2020-04-22 RX ORDER — IBUPROFEN 800 MG/1
TABLET ORAL
COMMUNITY
Start: 2020-04-15 | End: 2020-04-22

## 2020-04-22 RX ORDER — BUTALBITAL, ACETAMINOPHEN AND CAFFEINE 50; 325; 40 MG/1; MG/1; MG/1
TABLET ORAL
COMMUNITY
Start: 2020-02-14 | End: 2020-04-22

## 2020-04-22 RX ORDER — TRAZODONE HYDROCHLORIDE 150 MG/1
TABLET ORAL
COMMUNITY
Start: 2020-04-21 | End: 2020-04-22

## 2020-04-22 RX ORDER — TRAZODONE HYDROCHLORIDE 50 MG/1
50-100 TABLET ORAL
Qty: 60 TAB | Refills: 3 | Status: SHIPPED | OUTPATIENT
Start: 2020-04-22 | End: 2020-10-21 | Stop reason: SDUPTHER

## 2020-04-22 RX ORDER — MELOXICAM 7.5 MG/1
TABLET ORAL
COMMUNITY
Start: 2020-02-16 | End: 2020-04-22

## 2020-04-22 RX ORDER — HYDROXYZINE HYDROCHLORIDE 25 MG/1
TABLET, FILM COATED ORAL
COMMUNITY
Start: 2020-02-14 | End: 2020-04-22

## 2020-04-22 RX ORDER — FERROUS SULFATE 325(65) MG
TABLET ORAL DAILY
COMMUNITY
Start: 2020-03-31 | End: 2020-04-22

## 2020-04-22 RX ORDER — VENLAFAXINE HYDROCHLORIDE 75 MG/1
CAPSULE, EXTENDED RELEASE ORAL EVERY MORNING
COMMUNITY
Start: 2020-04-14 | End: 2020-04-22

## 2020-04-22 RX ORDER — VARENICLINE TARTRATE 1 MG/1
TABLET, FILM COATED ORAL
COMMUNITY
Start: 2020-02-26 | End: 2020-04-22

## 2020-04-22 RX ORDER — CLONAZEPAM 0.5 MG/1
TABLET ORAL
COMMUNITY
Start: 2020-03-16 | End: 2020-04-22

## 2020-04-22 RX ORDER — DULOXETIN HYDROCHLORIDE 20 MG/1
CAPSULE, DELAYED RELEASE ORAL
COMMUNITY
Start: 2020-02-26 | End: 2020-04-22

## 2020-04-22 RX ORDER — CLINDAMYCIN HYDROCHLORIDE 300 MG/1
CAPSULE ORAL
COMMUNITY
Start: 2020-04-15 | End: 2020-04-22

## 2020-04-22 RX ORDER — RIZATRIPTAN BENZOATE 10 MG/1
TABLET ORAL
COMMUNITY
Start: 2020-03-16 | End: 2020-04-22

## 2020-04-22 RX ORDER — VENLAFAXINE HYDROCHLORIDE 37.5 MG/1
CAPSULE, EXTENDED RELEASE ORAL EVERY MORNING
COMMUNITY
Start: 2020-02-19 | End: 2020-04-22

## 2020-04-22 RX ORDER — BUSPIRONE HYDROCHLORIDE 15 MG/1
TABLET ORAL
COMMUNITY
Start: 2020-04-11 | End: 2020-04-22

## 2020-04-22 RX ORDER — FLUTICASONE PROPIONATE 50 MCG
1 SPRAY, SUSPENSION (ML) NASAL DAILY
Qty: 16 G | Refills: 5 | Status: SHIPPED | OUTPATIENT
Start: 2020-04-22 | End: 2021-05-24 | Stop reason: SDUPTHER

## 2020-04-22 RX ORDER — PROMETHAZINE HYDROCHLORIDE 25 MG/1
TABLET ORAL
COMMUNITY
Start: 2020-02-25 | End: 2020-04-22

## 2020-04-22 RX ORDER — CELECOXIB 200 MG/1
CAPSULE ORAL
COMMUNITY
Start: 2020-04-21 | End: 2020-04-22

## 2020-04-22 RX ORDER — PROPRANOLOL HYDROCHLORIDE 10 MG/1
TABLET ORAL
COMMUNITY
Start: 2020-02-26 | End: 2020-04-22 | Stop reason: SDUPTHER

## 2020-04-22 RX ORDER — HYDROXYZINE PAMOATE 50 MG/1
CAPSULE ORAL
COMMUNITY
Start: 2020-01-30 | End: 2020-04-22

## 2020-04-22 RX ORDER — RISPERIDONE 1 MG/1
TABLET ORAL
COMMUNITY
Start: 2020-04-03 | End: 2020-04-22

## 2020-04-22 RX ORDER — CLONAZEPAM 1 MG/1
TABLET ORAL 2 TIMES DAILY PRN
COMMUNITY
Start: 2020-04-14 | End: 2020-04-22

## 2020-04-22 RX ORDER — PROPRANOLOL HYDROCHLORIDE 10 MG/1
10 TABLET ORAL 2 TIMES DAILY
Qty: 60 TAB | Refills: 3 | Status: SHIPPED | OUTPATIENT
Start: 2020-04-22 | End: 2021-02-05

## 2020-04-22 RX ORDER — TRAMADOL HYDROCHLORIDE 50 MG/1
TABLET ORAL
COMMUNITY
Start: 2020-04-21 | End: 2020-04-22

## 2020-04-22 RX ORDER — PANTOPRAZOLE SODIUM 40 MG/1
TABLET, DELAYED RELEASE ORAL
COMMUNITY
Start: 2020-04-21 | End: 2020-04-22

## 2020-04-22 RX ORDER — ONDANSETRON 8 MG/1
TABLET, ORALLY DISINTEGRATING ORAL
COMMUNITY
Start: 2020-02-23 | End: 2020-04-22

## 2020-04-22 RX ORDER — HYDROXYZINE 50 MG/1
TABLET, FILM COATED ORAL
COMMUNITY
Start: 2020-04-15 | End: 2020-04-22

## 2020-04-22 RX ORDER — ALPRAZOLAM 1 MG/1
1 TABLET ORAL 2 TIMES DAILY
Qty: 60 TAB | Refills: 0 | Status: SHIPPED | OUTPATIENT
Start: 2020-04-22 | End: 2020-05-22

## 2020-04-22 RX ORDER — ERENUMAB-AOOE 140 MG/ML
INJECTION, SOLUTION SUBCUTANEOUS
COMMUNITY
Start: 2020-02-25 | End: 2020-04-22

## 2020-04-22 RX ORDER — DICYCLOMINE HYDROCHLORIDE 10 MG/1
CAPSULE ORAL
COMMUNITY
Start: 2020-03-16 | End: 2020-04-22

## 2020-04-22 RX ORDER — AMOXICILLIN 500 MG/1
CAPSULE ORAL
COMMUNITY
Start: 2020-03-03 | End: 2020-04-22

## 2020-04-22 RX ORDER — AZITHROMYCIN 250 MG/1
TABLET, FILM COATED ORAL
COMMUNITY
Start: 2020-02-26 | End: 2020-04-22

## 2020-04-22 RX ORDER — PROMETHAZINE HYDROCHLORIDE 12.5 MG/1
TABLET ORAL
COMMUNITY
Start: 2020-03-09 | End: 2020-04-22

## 2020-04-22 RX ORDER — PREGABALIN 100 MG/1
CAPSULE ORAL
COMMUNITY
Start: 2020-04-09 | End: 2020-04-22

## 2020-04-22 RX ORDER — MIRTAZAPINE 45 MG/1
TABLET, FILM COATED ORAL
COMMUNITY
Start: 2020-04-11 | End: 2020-04-22

## 2020-04-22 NOTE — ASSESSMENT & PLAN NOTE
She reports difficulty sleeping but is doing well with trazodone.  Her psychiatrist was prescribing her 50 mg 2 tablets nightly.  She states that she did try the 100 mg and did not feel like it worked as well as the two 50 mg so she is requesting this prescription.  Additionally, it is easier for her to take a half dose if she needs to.

## 2020-04-22 NOTE — LETTER
April 22, 2020      To whom it may concern:    Danielle Barcenas is currently a patient under my care at the [facility].  At this time, she suffers from anxiety and depression and benefits from having her dog as an emotional support animal as part of her treatment.    If you have any questions or concerns, please don't hesitate to call.        Sincerely,        Josefina De Leon M.D.    Electronically Signed

## 2020-04-22 NOTE — ASSESSMENT & PLAN NOTE
She reports that her allergies have been acting up lately.  She is requesting a prescription for Flonase which has worked well for her in the past.

## 2020-04-22 NOTE — ASSESSMENT & PLAN NOTE
She reports continuing to struggle significantly with anxiety.  She states that after seeing psychiatry right after her last visit in February she has been unable to follow-up due to the coronavirus pandemic.  They did start her on propranolol 10 mg twice daily, trazodone 50 to 100 mg nightly, and venlafaxine 37.5 mg for the first week and then increased her dose to 75 mg.  With the venlafaxine, she reports doing well on the 37.5 dose but when she increased it she started feeling nauseous every time she took the medication.  She continued with it for a month but then stopped due to the nausea.  She did notice improvement in her anxiety while on it but prefers to try a lower dose.  She was also getting clonazepam and was taking 0.5 to 1 mg twice daily.  She reports that this was somewhat effective in controlling her symptoms however this last week, her 's 2 sons have moved into her house and she is having more panic attacks.  In the past she has used alprazolam which is been more effective for her.  She is requesting to temporarily switch to this medication.  She is not currently following up with psychiatry or therapy.

## 2020-04-22 NOTE — PROGRESS NOTES
Telemedicine Visit: Established Patient     This encounter was conducted via Zoom .   Verbal consent was obtained. Patient's identity was verified.    Subjective:   CC: anxiety, requesting emotional support animal letter, insomnia, allergies    Danielle Barcenas is a 39 y.o. female presenting for evaluation and management of:    Anxiety:  She reports continuing to struggle significantly with anxiety.  She states that after seeing psychiatry right after her last visit in February she has been unable to follow-up due to the coronavirus pandemic.  They did start her on propranolol 10 mg twice daily, trazodone 50 to 100 mg nightly, and venlafaxine 37.5 mg for the first week and then increased her dose to 75 mg.  With the venlafaxine, she reports doing well on the 37.5 dose but when she increased it she started feeling nauseous every time she took the medication.  She continued with it for a month but then stopped due to the nausea.  She did notice improvement in her anxiety while on it but prefers to try a lower dose.  She was also getting clonazepam and was taking 0.5 to 1 mg twice daily.  She reports that this was somewhat effective in controlling her symptoms however this last week, her 's 2 sons have moved into her house and she is having more panic attacks.  In the past she has used alprazolam which is been more effective for her.  She is requesting to temporarily switch to this medication.  She is not currently following up with psychiatry or therapy.    Seasonal allergies:  She reports that her allergies have been acting up lately.  She is requesting a prescription for Flonase which has worked well for her in the past.    Insomnia:  She reports difficulty sleeping but is doing well with trazodone.  Her psychiatrist was prescribing her 50 mg 2 tablets nightly.  She states that she did try the 100 mg and did not feel like it worked as well as the two 50 mg so she is requesting this prescription.   "Additionally, it is easier for her to take a half dose if she needs to.    ROS   Denies any recent fevers or chills. No nausea or vomiting. No chest pains or shortness of breath.     Allergies   Allergen Reactions   • Toradol Rash     Pt states \"first time I got a rash, second time I was throwing up\"       Current medicines (including changes today)  Current Outpatient Medications   Medication Sig Dispense Refill   • traZODone (DESYREL) 50 MG Tab Take 1-2 Tabs by mouth every bedtime. 60 Tab 3   • propranolol (INDERAL) 10 MG Tab Take 1 Tab by mouth 2 times a day. 60 Tab 3   • ALPRAZolam (XANAX) 1 MG Tab Take 1 Tab by mouth 2 Times a Day for 30 days. 60 Tab 0   • venlafaxine XR (EFFEXOR XR) 37.5 MG CAPSULE SR 24 HR Take 1 Cap by mouth every day. Take 1 capsule by mouth every morning 30 Cap 3   • fluticasone (FLONASE) 50 MCG/ACT nasal spray Spray 1 Spray in nose every day. 16 g 5   • ascorbic acid (VITAMIN C) 500 MG tablet Take 1 Tab by mouth every day. Take at the same time as the iron to help absorption 30 Tab 11   • ferrous sulfate 325 (65 Fe) MG EC tablet Take 1 Tab by mouth every day. 30 Tab 5   • topiramate (TOPAMAX) 100 MG Tab Take 0.5 Tabs by mouth every day. 90 Tab 1   • ibuprofen (MOTRIN) 600 MG Tab Take 1 Tab by mouth every 8 hours as needed for Moderate Pain or Headache. 30 Tab 3     No current facility-administered medications for this visit.        Patient Active Problem List    Diagnosis Date Noted   • Seasonal allergies 04/22/2020   • Generalized anxiety disorder with panic attacks 02/19/2020   • Primary insomnia 01/21/2020   • Intractable migraine without aura and without status migrainosus 01/21/2020   • Restless leg syndrome 01/21/2020   • Chronic pelvic pain in female 01/21/2020   • Menorrhagia with regular cycle 11/12/2019   • Dyspareunia in female 11/12/2019   • Constipation 11/12/2019   • Encounter for initial prescription of transdermal patch hormonal contraceptive device 11/12/2019   • Moderate " "episode of recurrent major depressive disorder (HCC) 08/03/2017       Family History   Problem Relation Age of Onset   • Hyperlipidemia Mother    • Hypertension Father    • Heart Disease Father         valve problem   • Stroke Maternal Grandfather    • Diabetes Maternal Grandfather    • Cancer Brother         lymphoma   • Stroke Maternal Grandmother        She  has a past medical history of Anxiety, Depression, Insomnia, and Migraine.  She  has a past surgical history that includes wrist orif (Left); other orthopedic surgery; primary c section; and ankle orif (Left).       Objective:   Vitals obtained by patient:  Respirations through observation: 12, Height: 5'0\" and Weight: 110 lb    Physical Exam:  Constitutional: Alert, no distress, well-groomed.  Skin: No rashes in visible areas.  Eye: Round. Conjunctiva clear, lids normal. No icterus.   ENMT: Lips pink without lesions, good dentition, moist mucous membranes. Phonation normal.  Neck: No masses, no thyromegaly. Moves freely without pain.  Respiratory: Unlabored respiratory effort, no cough or audible wheeze  Psych: Alert and oriented x3, somewhat anxious appearing      Assessment and Plan:   The following treatment plan was discussed:     1. Primary insomnia  Stable, controlled with current medications which were refilled today  - traZODone (DESYREL) 50 MG Tab; Take 1-2 Tabs by mouth every bedtime.  Dispense: 60 Tab; Refill: 3    2. Seasonal allergies  - fluticasone (FLONASE) 50 MCG/ACT nasal spray; Spray 1 Spray in nose every day.  Dispense: 16 g; Refill: 5    3. Generalized anxiety disorder with panic attacks  Uncontrolled.  I have refilled the propranolol as well as venlafaxine at a lower dose which she had previously tolerated well.  I gave her a one-month supply of Xanax 1 mg twice daily with the understanding that we will follow-up in 3 weeks via video visit to reevaluate her need for this medication.  We discussed that it should be used short-term and " next month we should plan on tapering down.  Emotional support animal letter has been written.  She was encouraged to follow-up with psychiatry as well as therapist and I have provided her with the phone number to schedule a therapist.  - propranolol (INDERAL) 10 MG Tab; Take 1 Tab by mouth 2 times a day.  Dispense: 60 Tab; Refill: 3  - ALPRAZolam (XANAX) 1 MG Tab; Take 1 Tab by mouth 2 Times a Day for 30 days.  Dispense: 60 Tab; Refill: 0  - venlafaxine XR (EFFEXOR XR) 37.5 MG CAPSULE SR 24 HR; Take 1 Cap by mouth every day. Take 1 capsule by mouth every morning  Dispense: 30 Cap; Refill: 3  -She will establish care with a therapist  -Emotional support animal letter has been written  -Follow-up in 3 weeks, plan for Xanax to taper down to 30 tablets next month        Follow-up: Return in about 3 weeks (around 5/13/2020), or if symptoms worsen or fail to improve, for anxiety.

## 2020-05-09 NOTE — ED NOTES
Pt arrives to ED via EMS after reportedly consuming xanax, oxycodone, and a gummy marijuana edible. Pt's girlfriend found him in bathroom, began CPR. When EMS arrived, pt was cyanotic w/ agonal breathing. Pt was found near straw and has powder residue on nose. Pt given 6 mg Narcan en route w/ R AC 20 G IV estb. Pt reports he does not want to hurt himself or others, reports, \"I just wanted to relax.\" Pt resps even and unlabored, CMS intact. Pt romero well, converses appropriately.   Received orders for DC. PIV removed and dressing applied. Educated on need to establish PCP. Educated regarding prescribed medications. VSS. Ambulatory to meet ride in lobby.

## 2020-05-26 DIAGNOSIS — F41.0 GENERALIZED ANXIETY DISORDER WITH PANIC ATTACKS: ICD-10-CM

## 2020-05-26 DIAGNOSIS — Z02.83 ENCOUNTER FOR DRUG SCREENING: ICD-10-CM

## 2020-05-26 DIAGNOSIS — F41.1 GENERALIZED ANXIETY DISORDER WITH PANIC ATTACKS: ICD-10-CM

## 2020-05-26 DIAGNOSIS — Z79.899 CONTROLLED SUBSTANCE AGREEMENT SIGNED: ICD-10-CM

## 2020-05-26 RX ORDER — ALPRAZOLAM 1 MG/1
1 TABLET ORAL 2 TIMES DAILY
Qty: 60 TAB | Refills: 0 | Status: SHIPPED | OUTPATIENT
Start: 2020-05-26 | End: 2020-06-25

## 2020-05-26 NOTE — PROGRESS NOTES
Danielle presented today to clinic to sign a controlled substance agreement and provide a urine sample for drug screening. Since COVID-19 she has been unable to get scheduled with psychiatry. However, she will be notified in a few weeks to get scheduled. She understands that this is a medication that should be prescribed and monitored by her psychiatrist.  She understands that this is a bridge script that is being provided to get her to her next appointment. She understands and agrees.

## 2020-07-09 DIAGNOSIS — F41.0 GENERALIZED ANXIETY DISORDER WITH PANIC ATTACKS: ICD-10-CM

## 2020-07-09 DIAGNOSIS — F41.1 GENERALIZED ANXIETY DISORDER WITH PANIC ATTACKS: ICD-10-CM

## 2020-07-10 DIAGNOSIS — F41.1 GENERALIZED ANXIETY DISORDER WITH PANIC ATTACKS: ICD-10-CM

## 2020-07-10 DIAGNOSIS — F41.0 GENERALIZED ANXIETY DISORDER WITH PANIC ATTACKS: ICD-10-CM

## 2020-07-10 DIAGNOSIS — F33.1 MODERATE EPISODE OF RECURRENT MAJOR DEPRESSIVE DISORDER (HCC): ICD-10-CM

## 2020-07-10 RX ORDER — HYDROCODONE BITARTRATE AND ACETAMINOPHEN 7.5; 325 MG/1; MG/1
TABLET ORAL
COMMUNITY
Start: 2020-04-27 | End: 2021-02-05

## 2020-07-10 RX ORDER — VENLAFAXINE HYDROCHLORIDE 37.5 MG/1
37.5 CAPSULE, EXTENDED RELEASE ORAL DAILY
Qty: 30 CAP | Refills: 0 | Status: SHIPPED | OUTPATIENT
Start: 2020-07-10 | End: 2020-09-09 | Stop reason: SDUPTHER

## 2020-07-10 RX ORDER — CLINDAMYCIN HYDROCHLORIDE 150 MG/1
CAPSULE ORAL
COMMUNITY
Start: 2020-04-27 | End: 2021-02-05

## 2020-07-10 RX ORDER — ALPRAZOLAM 1 MG/1
1 TABLET ORAL NIGHTLY PRN
Qty: 30 TAB | Refills: 0 | Status: SHIPPED | OUTPATIENT
Start: 2020-07-10 | End: 2020-08-09

## 2020-07-10 NOTE — PROGRESS NOTES
Prescription sent to the pharmacy for 1 mg Alprazolam. Titrated down to 1 mg once daily for 30 days as mentioned and planned in Dr. De Leon's last note.

## 2020-08-28 DIAGNOSIS — N92.0 MENORRHAGIA WITH REGULAR CYCLE: ICD-10-CM

## 2020-08-28 RX ORDER — LANOLIN ALCOHOL/MO/W.PET/CERES
325 CREAM (GRAM) TOPICAL DAILY
Qty: 30 TAB | Refills: 5 | Status: SHIPPED | OUTPATIENT
Start: 2020-08-28 | End: 2021-03-30 | Stop reason: SDUPTHER

## 2020-09-09 DIAGNOSIS — F41.0 GENERALIZED ANXIETY DISORDER WITH PANIC ATTACKS: ICD-10-CM

## 2020-09-09 DIAGNOSIS — G43.019 INTRACTABLE MIGRAINE WITHOUT AURA AND WITHOUT STATUS MIGRAINOSUS: ICD-10-CM

## 2020-09-09 DIAGNOSIS — F41.1 GENERALIZED ANXIETY DISORDER WITH PANIC ATTACKS: ICD-10-CM

## 2020-09-09 RX ORDER — VENLAFAXINE HYDROCHLORIDE 37.5 MG/1
5 CAPSULE, EXTENDED RELEASE ORAL DAILY
Qty: 30 CAP | Refills: 0 | Status: SHIPPED | OUTPATIENT
Start: 2020-09-09 | End: 2020-09-22 | Stop reason: SDUPTHER

## 2020-09-09 RX ORDER — IBUPROFEN 600 MG/1
600 TABLET ORAL EVERY 8 HOURS PRN
Qty: 30 TAB | Refills: 5 | Status: SHIPPED | OUTPATIENT
Start: 2020-09-09 | End: 2021-07-15 | Stop reason: SDUPTHER

## 2020-09-22 DIAGNOSIS — F41.1 GENERALIZED ANXIETY DISORDER WITH PANIC ATTACKS: ICD-10-CM

## 2020-09-22 DIAGNOSIS — F41.0 GENERALIZED ANXIETY DISORDER WITH PANIC ATTACKS: ICD-10-CM

## 2020-09-23 RX ORDER — VENLAFAXINE HYDROCHLORIDE 37.5 MG/1
37.5 CAPSULE, EXTENDED RELEASE ORAL DAILY
Qty: 30 CAP | Refills: 5 | Status: SHIPPED | OUTPATIENT
Start: 2020-09-23 | End: 2021-02-05

## 2020-10-21 DIAGNOSIS — F51.01 PRIMARY INSOMNIA: ICD-10-CM

## 2020-10-22 RX ORDER — TRAZODONE HYDROCHLORIDE 50 MG/1
50-100 TABLET ORAL
Qty: 60 TAB | Refills: 3 | Status: SHIPPED | OUTPATIENT
Start: 2020-10-22 | End: 2021-02-05

## 2020-11-09 ENCOUNTER — PATIENT MESSAGE (OUTPATIENT)
Dept: MEDICAL GROUP | Facility: MEDICAL CENTER | Age: 40
End: 2020-11-09

## 2020-11-09 DIAGNOSIS — N92.6 IRREGULAR MENSES: ICD-10-CM

## 2020-12-17 ENCOUNTER — GYNECOLOGY VISIT (OUTPATIENT)
Dept: OBGYN | Facility: CLINIC | Age: 40
End: 2020-12-17
Payer: MEDICAID

## 2020-12-17 DIAGNOSIS — F41.1 GENERALIZED ANXIETY DISORDER WITH PANIC ATTACKS: ICD-10-CM

## 2020-12-17 DIAGNOSIS — F41.0 GENERALIZED ANXIETY DISORDER WITH PANIC ATTACKS: ICD-10-CM

## 2020-12-17 DIAGNOSIS — N93.9 ABNORMAL UTERINE BLEEDING (AUB): ICD-10-CM

## 2020-12-17 DIAGNOSIS — N94.10 DYSPAREUNIA IN FEMALE: ICD-10-CM

## 2020-12-17 DIAGNOSIS — G89.29 CHRONIC PELVIC PAIN IN FEMALE: ICD-10-CM

## 2020-12-17 DIAGNOSIS — R10.2 CHRONIC PELVIC PAIN IN FEMALE: ICD-10-CM

## 2020-12-17 PROCEDURE — 58300 INSERT INTRAUTERINE DEVICE: CPT | Performed by: OBSTETRICS & GYNECOLOGY

## 2020-12-17 RX ORDER — CLONAZEPAM 1 MG/1
0.5 TABLET ORAL 2 TIMES DAILY
COMMUNITY
End: 2023-04-12

## 2020-12-17 NOTE — PROGRESS NOTES
GYN visit for irregular menses  LMP: 12/01/2020  WT: 95 lb  BP: 92/50  Last pap: 01/23/2020 negative  Good # [phone]

## 2020-12-31 NOTE — PROCEDURES
40 y.o.    Female presents here today for her IUD insertion:     No LMP recorded.      Today the patient is counseled on the risks of IUD insertion. I also discussed with the patient the risk of infection on insertion, and had asked the patient to remain on pelvic rest for one week following the insertion. We also discussed the risk of IUD expulsion, the risk of uterine perforation and IUD migration. If the IUD does migrate the patient may require a separate procedure such as a laparoscopy to retrieve the migrated IUD. I also discussed the 1% risk of pregnancy with IUD use. Also discussed with patient today increased risk of ectopic pregnancy with IUD use. Discussed specific side effects of ParaGard IUD which can be increased vaginal bleeding during menses or increased dysmenorrhea. Also discussed today the possibility that IUD may need to be removed secondary to bleeding profile or pain. Also discussed were the possibility that partner can feel the IUD during intercourse. I also discussed the side effects of Mirena which can be amenorrhea or dysfunctional uterine bleeding or spotting.  Patient had the opportunity to ask questions regarding insertion, risks and benefits, all questions are answered in their entirety.  Informed consent is signed.    Procedure note  Urine pregnancy test is negative, informed consent was previously signed  The bimanual exam is performed the uterus is noted to be 6 weeks in size and is mid position  A speculum was inserted into the vagina, the cervix was cleansed with Betadine swabs x3  Tenaculum was placed on the anterior lip of the cervix  The uterus was sounded to a depth of 7 centimeters  The IUD is placed under sterile conditions, without complications.   The strings trimmed to approximately 3 cm  Tenaculum was removed from the cervix and hemostasis was achieved with silver nitrate  The patient tolerated the procedure well    Aftercare discussed. Patient is asked to refrain  from coitus or use backup method for 7 days after insertion. She is to return for fever, worsening pelvic pain, abdominal pain, syncope, unusually heavy vaginal bleeding, suspected expulsion, foul smelling vaginal discharge, or pregnancy-like symptoms. If the patient is comfortable she may also perform a digital self examination to check for the strings, although this is not required.    Patient is asked to follow up in 4 to 6 weeks for IUD check.

## 2020-12-31 NOTE — PROGRESS NOTES
Long standing chronic pelvic pain  AUB associated with pain  Would like to try Mirena IUD  Other options reviewed and patient would like insertion today

## 2021-01-05 ENCOUNTER — GYNECOLOGY VISIT (OUTPATIENT)
Dept: OBGYN | Facility: CLINIC | Age: 41
End: 2021-01-05
Payer: MEDICAID

## 2021-01-05 VITALS
BODY MASS INDEX: 19.89 KG/M2 | DIASTOLIC BLOOD PRESSURE: 70 MMHG | WEIGHT: 101.3 LBS | HEIGHT: 60 IN | SYSTOLIC BLOOD PRESSURE: 110 MMHG

## 2021-01-05 DIAGNOSIS — Z30.014 ENCOUNTER FOR INITIAL PRESCRIPTION OF INTRAUTERINE CONTRACEPTIVE DEVICE (IUD): Primary | ICD-10-CM

## 2021-01-05 DIAGNOSIS — Z30.430 ENCOUNTER FOR INSERTION OF MIRENA IUD: ICD-10-CM

## 2021-01-05 PROCEDURE — 58300 INSERT INTRAUTERINE DEVICE: CPT | Performed by: NURSE PRACTITIONER

## 2021-01-05 NOTE — NON-PROVIDER
Patient here for a GYN follow up.   Last seen on 12/17/2020  pharmacy verified.  Patient phone #: [phone]  Mirena Insertion

## 2021-01-05 NOTE — PROCEDURES
IUD Insertion    Date/Time: 1/5/2021 11:11 AM  Performed by: SIMONE Gordillo  Authorized by: SIMONE Gordillo     Consent:     Consent obtained:  Verbal and written    Consent given by:  Patient    Procedure risks and benefits discussed: yes      Patient questions answered: yes      Patient agrees, verbalizes understanding, and wants to proceed: yes      Educational handouts given: yes      Instructions and paperwork completed: yes    Pre-procedure details:     Negative urine pregnancy test: yes    Procedure:     Pelvic exam performed: yes      Sterile speculum placed in vagina: yes      Cervix visualized: yes      Cervix cleaned and prepped in sterile fashion: yes      Tenaculum applied to cervix: yes      Dilation needed: no      Uterus sounded: yes      Uterus sound depth (cm):  9.5    IUD inserted with no complications: yes      IUD type:  Mirena        Today the patient is counseled on the risks of IUD insertion. Specifically discussed were alternative forms of birth control. I also discussed with the patient the risk of infection on insertion, and had asked the patient to remain on pelvic rest for one week following the insertion. We also discussed the risk of IUD expulsion, the risk of uterine perforation and IUD migration. If the IUD does migrate the patient may require a separate procedure such as a laparoscopy to retrieve the migrated IUD. I also discussed the 1% risk of pregnancy with IUD use. I also discussed the side effects of Mirena IUD which can be amenorrhea or dysfunctional uterine bleeding or spotting.  Patient had the opportunity to ask questions regarding insertion, risks and benefits, all questions are answered in their entirety.  Informed consent is signed    Procedure note  Urine pregnancy test is negative, informed consent was previously signed  The bimanual exam is performed the uterus is noted to be 9 weeks in size and is mid position  A speculum was inserted into the  vagina, the cervix was cleansed with Betadine swabs x3  Tenaculum was placed on the anterior lip of the cervix at 11:00 and 1:00   The uterus was sounded to 9.5 centimeters  The IUD is placed under sterile conditions: no complications  The strings trimmed to approximately 3 cm  Tenaculum was removed from the cervix and hemostasis was achieved with pressure only  The patient tolerated the procedure well      Lot: [id number]  Exp: JAN 2023      Patient is asked to followup in 4-6 weeks for IUD check. The patient is asked to remain on pelvic rest for 2-3 days.  Use a backup method for 7 days, condoms. She is asked to return sooner than 4-6 weeks for heavy vaginal bleeding uncontrolled pain or fever

## 2021-02-05 ENCOUNTER — TELEMEDICINE (OUTPATIENT)
Dept: MEDICAL GROUP | Facility: MEDICAL CENTER | Age: 41
End: 2021-02-05
Attending: INTERNAL MEDICINE
Payer: MEDICAID

## 2021-02-05 VITALS — WEIGHT: 95 LBS | RESPIRATION RATE: 20 BRPM | HEIGHT: 60 IN | BODY MASS INDEX: 18.65 KG/M2

## 2021-02-05 DIAGNOSIS — E61.1 IRON DEFICIENCY: ICD-10-CM

## 2021-02-05 PROCEDURE — 99213 OFFICE O/P EST LOW 20 MIN: CPT | Mod: 95,CR | Performed by: INTERNAL MEDICINE

## 2021-02-05 RX ORDER — VENLAFAXINE 100 MG/1
100 TABLET ORAL
COMMUNITY
Start: 2021-01-11 | End: 2021-07-14

## 2021-02-05 RX ORDER — TRAZODONE HYDROCHLORIDE 100 MG/1
TABLET ORAL
COMMUNITY
Start: 2021-01-11 | End: 2021-07-14

## 2021-02-05 NOTE — PROGRESS NOTES
"Virtual Visit: Established Patient   This visit was conducted via Zoom using secure and encrypted videoconferencing technology. The patient was in a private location in the state of [place].    The patient's identity was confirmed and verbal consent was obtained for this virtual visit.    Subjective:   CC: requesting labs to check iron level      Danielle Barcenas is a 40 y.o. female presenting for evaluation and management of:      Iron deficiency  She has a history of anemia based on labs checked 1 year ago.  She has been taking iron 325 with vitamin c daily since then.  She is wondering if she needs to continue with these supplements.  Previously we attributed her iron deficiently to menorrhagia however she recently got the mirena IUD about 1 month ago and reports much less bleeding now.  She is currently feeling well, denies fatigue, lightheadedness, chest pain, shortness of breath.         ROS   Denies any recent fevers or chills. No nausea or vomiting. No chest pains or shortness of breath.     Allergies   Allergen Reactions   • Toradol Rash     Pt states \"first time I got a rash, second time I was throwing up\"       Current medicines (including changes today)  Current Outpatient Medications   Medication Sig Dispense Refill   • Multiple Vitamin (MULTIVITAMIN ADULT PO) Take  by mouth.     • levonorgestrel (MIRENA) 52 mg (20 mcg/24 hr) IUD 1 Each by Intrauterine route one time.     • clonazePAM (KLONOPIN) 1 MG Tab Take 0.5 mg by mouth 2 times a day.     • ibuprofen (MOTRIN) 600 MG Tab Take 1 Tab by mouth every 8 hours as needed for Moderate Pain or Headache. 30 Tab 5   • ferrous sulfate 325 (65 Fe) MG EC tablet Take 1 Tab by mouth every day. 30 Tab 5   • fluticasone (FLONASE) 50 MCG/ACT nasal spray Spray 1 Spray in nose every day. 16 g 5   • ascorbic acid (VITAMIN C) 500 MG tablet Take 1 Tab by mouth every day. Take at the same time as the iron to help absorption 30 Tab 11   • venlafaxine (EFFEXOR) 100 MG " tablet Take 100 mg by mouth.     • traZODone (DESYREL) 100 MG Tab TAKE 1 AND 1/2 TABLETS BY MOUTH AT BEDTIME AS NEEDED       No current facility-administered medications for this visit.        Patient Active Problem List    Diagnosis Date Noted   • Iron deficiency 02/05/2021   • Seasonal allergies 04/22/2020   • Generalized anxiety disorder with panic attacks 02/19/2020   • Primary insomnia 01/21/2020   • Intractable migraine without aura and without status migrainosus 01/21/2020   • Chronic pelvic pain in female 01/21/2020   • Menorrhagia with regular cycle 11/12/2019   • Dyspareunia in female 11/12/2019   • Constipation 11/12/2019   • Moderate episode of recurrent major depressive disorder (HCC) 08/03/2017       Family History   Problem Relation Age of Onset   • Hyperlipidemia Mother    • Hypertension Father    • Heart Disease Father         valve problem   • Stroke Maternal Grandfather    • Diabetes Maternal Grandfather    • Cancer Brother         lymphoma   • Stroke Maternal Grandmother        She  has a past medical history of Anxiety, Depression, Insomnia, and Migraine.  She  has a past surgical history that includes wrist orif (Left); other orthopedic surgery; ankle orif (Left); and primary c section.       Objective:   Resp 20   Ht 1.524 m (5')   Wt 43.1 kg (95 lb)   BMI 18.55 kg/m²     Physical Exam:  Constitutional: Alert, no distress, well-groomed.  Skin: No rashes in visible areas.  Eye: Round. Conjunctiva clear, lids normal. No icterus.   ENMT: Lips pink without lesions, good dentition, moist mucous membranes. Phonation normal.  Neck: No masses, no thyromegaly. Moves freely without pain.  Respiratory: Unlabored respiratory effort, no cough or audible wheeze  Psych: Alert and oriented x3, normal affect and mood.       Assessment and Plan:   The following treatment plan was discussed:     1. Iron deficiency  Expect improvement now that she has an iUD and lighter menses.  Discussed obtaining labs in 2  more months which would be 3 months after her last heavy cycle.  If iron normal, we can trial stopping it.  If low normal, discussed decreasing to QOD.  - CBC WITHOUT DIFFERENTIAL; Future  - FERRITIN; Future  - IRON/TOTAL IRON BIND; Future        Follow-up: Return if symptoms worsen or fail to improve.

## 2021-02-05 NOTE — ASSESSMENT & PLAN NOTE
She has a history of anemia based on labs checked 1 year ago.  She has been taking iron 325 with vitamin c daily since then.  She is wondering if she needs to continue with these supplements.  Previously we attributed her iron deficiently to menorrhagia however she recently got the mirena IUD about 1 month ago and reports much less bleeding now.  She is currently feeling well, denies fatigue, lightheadedness, chest pain, shortness of breath.

## 2021-02-05 NOTE — LETTER
February 5, 2021        Danielle Barcenas  [id number] [place] [id number]        Dear Danielle:    Here are the labs that have been ordered, they are through [facility]    If you have any questions or concerns, please don't hesitate to call.        Sincerely,        Josefina De Leon M.D.    Electronically Signed

## 2021-02-08 ENCOUNTER — HOSPITAL ENCOUNTER (OUTPATIENT)
Dept: RADIOLOGY | Facility: MEDICAL CENTER | Age: 41
End: 2021-02-08
Attending: NURSE PRACTITIONER
Payer: MEDICAID

## 2021-02-08 ENCOUNTER — TELEPHONE (OUTPATIENT)
Dept: OBGYN | Facility: CLINIC | Age: 41
End: 2021-02-08

## 2021-02-08 ENCOUNTER — GYNECOLOGY VISIT (OUTPATIENT)
Dept: OBGYN | Facility: CLINIC | Age: 41
End: 2021-02-08
Payer: MEDICAID

## 2021-02-08 VITALS
BODY MASS INDEX: 19.12 KG/M2 | SYSTOLIC BLOOD PRESSURE: 120 MMHG | WEIGHT: 97.4 LBS | DIASTOLIC BLOOD PRESSURE: 76 MMHG | HEIGHT: 60 IN

## 2021-02-08 DIAGNOSIS — N93.9 ABNORMAL UTERINE BLEEDING (AUB): Primary | ICD-10-CM

## 2021-02-08 DIAGNOSIS — Z30.431 CONTRACEPTIVE, SURVEILLANCE, INTRAUTERINE DEVICE: ICD-10-CM

## 2021-02-08 DIAGNOSIS — N93.9 ABNORMAL UTERINE BLEEDING (AUB): ICD-10-CM

## 2021-02-08 PROCEDURE — 76830 TRANSVAGINAL US NON-OB: CPT

## 2021-02-08 PROCEDURE — 99202 OFFICE O/P NEW SF 15 MIN: CPT | Performed by: NURSE PRACTITIONER

## 2021-02-08 NOTE — NON-PROVIDER
Patient here for a GYN follow up.   Last seen on 01/05/2021  c/o   Heavy bleeding   Pt wants IUD Removed   pharmacy verified.  Patient phone #: [phone]

## 2021-02-08 NOTE — PROGRESS NOTES
S: Danielle Barcenas is a 40 y.o. y.o. female who presents for contraceptive surveillance.    Pt had a Mirena  IUD placed on 01/15/2021 without any complications and presents for an IUD check up. She reports heavy bleeding, cramping type pain pain, positive discomfort with intercourse, no discharge. Denies fever, chills, nausea, vomiting. Would like the device removed    O:  FEMALE GYN: normal female external genitalia without lesions, bloody vaginal discharge noted, vulva pink without erythema or friability, urethra is normal without discharge or scarring, no bladder fullness or masses, normal vagina and normal vaginal tone, normal cervix, normal  uterus, size and consistency, NO IUD strings seen at cervical os    A/P:  -Discussed coordination of care of pelvic US and follow up visit. Pt cannot take ibuprofen due to breast surgery in 2 weeks, but will take Tylenol for pain   -Pelvic US ordered

## 2021-02-08 NOTE — TELEPHONE ENCOUNTER
Pt called triage line stating she had an IUD insertion on 1/5 all this weekend she has had really bad headaches, has been having shooting pains she can't feel her strings, and also this morning has had heavy bleeding, per consult with Saima FLEMING to have pt be seen today at 0930 am. Pt verbalized understanding and will comply.

## 2021-02-09 ENCOUNTER — GYNECOLOGY VISIT (OUTPATIENT)
Dept: OBGYN | Facility: CLINIC | Age: 41
End: 2021-02-09
Payer: MEDICAID

## 2021-02-09 VITALS
WEIGHT: 98.6 LBS | SYSTOLIC BLOOD PRESSURE: 98 MMHG | HEIGHT: 60 IN | DIASTOLIC BLOOD PRESSURE: 60 MMHG | BODY MASS INDEX: 19.36 KG/M2

## 2021-02-09 DIAGNOSIS — T83.32XD INTRAUTERINE CONTRACEPTIVE DEVICE THREADS LOST, SUBSEQUENT ENCOUNTER: ICD-10-CM

## 2021-02-09 DIAGNOSIS — Z30.432 ENCOUNTER FOR IUD REMOVAL: ICD-10-CM

## 2021-02-09 DIAGNOSIS — N93.9 ABNORMAL UTERINE BLEEDING (AUB): ICD-10-CM

## 2021-02-09 DIAGNOSIS — R10.2 PELVIC PAIN: ICD-10-CM

## 2021-02-09 PROCEDURE — 99213 OFFICE O/P EST LOW 20 MIN: CPT | Mod: 25 | Performed by: OBSTETRICS & GYNECOLOGY

## 2021-02-09 PROCEDURE — 58301 REMOVE INTRAUTERINE DEVICE: CPT | Performed by: OBSTETRICS & GYNECOLOGY

## 2021-02-09 NOTE — PROGRESS NOTES
Subjective:      Danielle Barcenas is a 40 y.o. female who presents with Gynecologic Exam (IUD pain/strings lost)            HPI patient is a 40-year-old G3, P2 who presents today for follow-up of pelvic pain and abnormal uterine bleeding.  Patient reports that she has had constant pain and persistent abnormal intermittent bleeding since IUD placement.  Pain is affecting her daily functioning and she is requesting IUD removal.  IUD strings are lost and patient was seen by midwife and sent for pelvic ultrasound presents today for follow-up.  She reports symptoms are unchanged.  Denies any fevers or chills.  Denies any nausea vomiting.  Patient has not used any over-the-counter medication for her pain symptoms.    ROS all organ systems were reviewed and were negative except for complaints in HPI       Objective:     BP (!) 98/60   Ht 1.524 m (5')   Wt 44.7 kg (98 lb 9.6 oz)   BMI 19.26 kg/m²      Physical Exam  Vitals signs and nursing note reviewed. Exam conducted with a chaperone present.   Constitutional:       General: She is not in acute distress.     Appearance: Normal appearance. She is normal weight. She is not toxic-appearing.   HENT:      Head: Normocephalic and atraumatic.   Abdominal:      General: Abdomen is flat. Bowel sounds are normal.      Palpations: Abdomen is soft.      Tenderness: There is no abdominal tenderness (Suprapubic tenderness).   Genitourinary:     General: Normal vulva.      Labia:         Right: No rash, tenderness, lesion or injury.         Left: No rash, tenderness, lesion or injury.       Urethra: No prolapse.      Vagina: Bleeding present. No vaginal discharge.      Cervix: No cervical motion tenderness or friability.      Uterus: Tender. Not enlarged.       Adnexa:         Right: No mass, tenderness or fullness.          Left: No mass, tenderness or fullness.        Comments: Small amount of dark blood present in the vaginal vault.  Cervix was visualized.  No active  bleeding noted.  IUD strings were not visualized  Musculoskeletal: Normal range of motion.      Right lower leg: No edema.      Left lower leg: No edema.   Lymphadenopathy:      Lower Body: No right inguinal adenopathy. No left inguinal adenopathy.   Skin:     General: Skin is warm and dry.      Findings: No lesion.   Neurological:      General: No focal deficit present.      Mental Status: She is alert and oriented to person, place, and time.      Gait: Gait normal.   Psychiatric:         Mood and Affect: Mood normal.         Behavior: Behavior normal.         Thought Content: Thought content normal.         Judgment: Judgment normal.          Discussion:  I reviewed patient's symptoms.  I discussed ultrasound findings.  Discussed IUD is in place but strings are likely retracted.  I discussed treatment options with patient requested IUD removal today.  I discussed contraception options available and patient states she does not want any contraception.  Patient states her spouse will have to have a vasectomy    2/8/2021 1:47 PM     HISTORY/REASON FOR EXAM:  Vaginal Bleeding; IUD placment also  Unable to locate string of intrauterine device     TECHNIQUE/EXAM DESCRIPTION:  Transabdominal and transvaginal pelvic ultrasound.     COMPARISON:   Pelvic ultrasound 3/11/2020     FINDINGS:  Both transabdominal and transvaginal scanning were performed to optimally visualize the pelvis.     The uterus measures 3.40 cm x 7.49 cm x 4.57 cm.  The uterine myometrium is within normal limits.  The endometrial echo complex measures 0.54 cm.     Intrauterine device is present and lies within the endometrial cavity upper uterine segment.     Low resistive waveforms are confirmed within the ovaries.  The right ovary measures 1.96 cm x 1.75 cm x 2.59 cm.     The left ovary measures 3.45 cm x 3.17 cm x 1.40 cm.     Within the left ovary there is a simple 2.7 cm cyst that is likely a follicle.           There is no free fluid  seen.     IMPRESSION:     1.  Intrauterine device present and appears appropriately located     2.  2.7 cm simple left adnexal cyst that is probably a follicle           Procedure:    Procedure - IUD removal:  Pt counseled on IUD removal. Risk for cramping, bleeding discussed.  Consent form signed.  Speculum placed in the vagina and cervix visualized. IUD strings not seen.  Cervix was prepped with Betadine.serpentine grasper was inserted through the os cervix into the endocervical canal and IUD strings were found and grasped. IUD was removed intact. Hemostasis noted. Pt tolerated procedure well.  No active bleeding noted      Assessment/Plan:        1. Pelvic pain  40-year-old with pelvic pain since IUD placement.  IUD strings were lost.  Ultrasound shows IUD in normal intrauterine location with patient requested IUD removal today.  Discussed pain management including over-the-counter medication including Tylenol and ibuprofen    2. Intrauterine contraceptive device threads lost, subsequent encounter  Ultrasound findings were discussed with patient.  Patient requested IUD removal    3. Abnormal uterine bleeding (AUB)  Patient has had abnormal bleeding since IUD placement    4. Encounter for IUD removal  IUD was removed intact today.  Patient tolerated procedure well.  Safe sex and backup contraception were discussed.  Patient does not want any contraception and states she will be abstinent   until her spouse gets a vasectomy  - Consent for all Surgical, Special Diagnostic or Therapeutic Procedures    5.  Precautions and plan of care reviewed.  Patient to follow-up as needed.

## 2021-02-09 NOTE — NON-PROVIDER
Patient here for a GYN follow up.   Last seen on  1/5/21 Mirena insertion  C/o Pt states she has been having a lot of pain and bleeding  US done yesterday  pharmacy verified.  [phone] (home)

## 2021-02-09 NOTE — NON-PROVIDER
Pt is here for IUD removal  Good phone #:[phone]  Pharmacy verified.  Pt states no other complaints for today.

## 2021-02-11 ENCOUNTER — APPOINTMENT (OUTPATIENT)
Dept: RADIOLOGY | Facility: MEDICAL CENTER | Age: 41
End: 2021-02-11
Attending: INTERNAL MEDICINE
Payer: MEDICAID

## 2021-03-25 ENCOUNTER — PATIENT MESSAGE (OUTPATIENT)
Dept: MEDICAL GROUP | Facility: MEDICAL CENTER | Age: 41
End: 2021-03-25

## 2021-03-25 DIAGNOSIS — B37.9 YEAST INFECTION: ICD-10-CM

## 2021-03-25 RX ORDER — FLUCONAZOLE 150 MG/1
150 TABLET ORAL DAILY
Qty: 1 TABLET | Refills: 0 | Status: SHIPPED | OUTPATIENT
Start: 2021-03-25 | End: 2021-07-14

## 2021-03-30 DIAGNOSIS — N92.0 MENORRHAGIA WITH REGULAR CYCLE: ICD-10-CM

## 2021-03-31 RX ORDER — LANOLIN ALCOHOL/MO/W.PET/CERES
325 CREAM (GRAM) TOPICAL DAILY
Qty: 30 TABLET | Refills: 5 | Status: SHIPPED | OUTPATIENT
Start: 2021-03-31 | End: 2023-04-12 | Stop reason: SDUPTHER

## 2021-04-09 ENCOUNTER — HOSPITAL ENCOUNTER (OUTPATIENT)
Dept: RADIOLOGY | Facility: MEDICAL CENTER | Age: 41
End: 2021-04-09
Attending: INTERNAL MEDICINE
Payer: MEDICAID

## 2021-04-09 DIAGNOSIS — Z12.31 VISIT FOR SCREENING MAMMOGRAM: ICD-10-CM

## 2021-04-09 PROCEDURE — 77063 BREAST TOMOSYNTHESIS BI: CPT

## 2021-05-24 DIAGNOSIS — J30.2 SEASONAL ALLERGIES: ICD-10-CM

## 2021-05-25 DIAGNOSIS — J30.2 SEASONAL ALLERGIES: ICD-10-CM

## 2021-05-25 RX ORDER — FLUTICASONE PROPIONATE 50 MCG
1 SPRAY, SUSPENSION (ML) NASAL DAILY
Qty: 16 G | Refills: 5 | Status: SHIPPED | OUTPATIENT
Start: 2021-05-25 | End: 2021-05-25 | Stop reason: SDUPTHER

## 2021-05-25 NOTE — TELEPHONE ENCOUNTER
Received request via: Pharmacy    Was the patient seen in the last year in this department? Yes    Does the patient have an active prescription (recently filled or refills available) for medication(s) requested? No     ( 90 day RX request)

## 2021-05-26 RX ORDER — FLUTICASONE PROPIONATE 50 MCG
1 SPRAY, SUSPENSION (ML) NASAL DAILY
Qty: 16 G | Refills: 5 | Status: SHIPPED | OUTPATIENT
Start: 2021-05-26 | End: 2022-03-17 | Stop reason: SDUPTHER

## 2021-07-14 ENCOUNTER — TELEMEDICINE (OUTPATIENT)
Dept: MEDICAL GROUP | Facility: MEDICAL CENTER | Age: 41
End: 2021-07-14
Attending: INTERNAL MEDICINE
Payer: MEDICAID

## 2021-07-14 VITALS — RESPIRATION RATE: 16 BRPM | WEIGHT: 98.6 LBS | HEIGHT: 60 IN | BODY MASS INDEX: 19.36 KG/M2

## 2021-07-14 DIAGNOSIS — G43.009 MIGRAINE WITHOUT AURA AND WITHOUT STATUS MIGRAINOSUS, NOT INTRACTABLE: ICD-10-CM

## 2021-07-14 DIAGNOSIS — F17.200 NICOTINE DEPENDENCE DUE TO VAPING NON-TOBACCO PRODUCT: ICD-10-CM

## 2021-07-14 DIAGNOSIS — R63.5 WEIGHT GAIN: ICD-10-CM

## 2021-07-14 PROBLEM — Z72.0 TOBACCO USE: Status: ACTIVE | Noted: 2021-07-14

## 2021-07-14 PROCEDURE — 99214 OFFICE O/P EST MOD 30 MIN: CPT | Mod: CR | Performed by: INTERNAL MEDICINE

## 2021-07-14 RX ORDER — NICOTINE 21 MG/24HR
1 PATCH, TRANSDERMAL 24 HOURS TRANSDERMAL EVERY 24 HOURS
Qty: 28 PATCH | Refills: 0 | Status: SHIPPED | OUTPATIENT
Start: 2021-07-14 | End: 2022-06-09 | Stop reason: SDUPTHER

## 2021-07-14 RX ORDER — TRAZODONE HYDROCHLORIDE 100 MG/1
200 TABLET ORAL NIGHTLY
COMMUNITY
End: 2022-02-09

## 2021-07-14 RX ORDER — ONDANSETRON 4 MG/1
TABLET, ORALLY DISINTEGRATING ORAL
Qty: 10 TABLET | Refills: 3 | Status: SHIPPED | OUTPATIENT
Start: 2021-07-14 | End: 2022-06-09

## 2021-07-14 RX ORDER — ONDANSETRON 4 MG/1
TABLET, ORALLY DISINTEGRATING ORAL
COMMUNITY
Start: 2021-04-21 | End: 2021-07-14 | Stop reason: SDUPTHER

## 2021-07-14 RX ORDER — TOPIRAMATE 100 MG/1
TABLET, FILM COATED ORAL
Qty: 30 TABLET | Refills: 5 | Status: SHIPPED | OUTPATIENT
Start: 2021-07-14 | End: 2022-11-04 | Stop reason: SDUPTHER

## 2021-07-14 RX ORDER — ARIPIPRAZOLE 5 MG/1
5 TABLET ORAL DAILY
COMMUNITY
End: 2022-02-09

## 2021-07-14 NOTE — PROGRESS NOTES
"Virtual Visit: Established Patient   This visit was conducted via Zoom using secure and encrypted videoconferencing technology. The patient was in a private location in the state of [place].    The patient's identity was confirmed and verbal consent was obtained for this virtual visit.    Subjective:   CC:   Chief Complaint   Patient presents with   • Nicotine Dependence       Danielle Barcenas is a 40 y.o. female presenting for evaluation and management of:      Nicotine dependence due to vaping non-tobacco product  She is currently using flavored nicotine cartridges and a vape pen.  Her cartridges are 5% with 3500 puffs and it takes her about 5 to 6 days to run through cartridge.  She is interested in quitting vaping.  Has never used any nicotine replacement or other smoking cessation medications in the past.      Weight gain  She reports starting on Abilify about 2 weeks ago.  In the past week and a half she has noted a 10 pound weight gain.  She typically weighs herself every day.  She denies any lower extremity edema.  She is concerned this is a side effect of the Abilify.  She has also noted nausea with the Abilify but fortunately it is helping with her mental health.      Migraine without aura and without status migrainosus, not intractable  She would like to restart her Topamax which she used to take for migraine prevention.  She has been off for several months.  Previously was taking 100 mg daily with good effect.         ROS   Denies any recent fevers or chills. No nausea or vomiting. No chest pains or shortness of breath.     Allergies   Allergen Reactions   • Toradol Rash     Pt states \"first time I got a rash, second time I was throwing up\"       Current medicines (including changes today)  Current Outpatient Medications   Medication Sig Dispense Refill   • ARIPiprazole (ABILIFY) 5 MG tablet Take 5 mg by mouth every day.     • Escitalopram Oxalate (LEXAPRO PO) Take  by mouth.     • traZODone " (DESYREL) 100 MG Tab Take 200 mg by mouth every evening.     • topiramate (TOPAMAX) 100 MG Tab Take 1/2 tab daily for 1 week then 1 full tab daily for headache prevention 30 tablet 5   • ondansetron (ZOFRAN ODT) 4 MG TABLET DISPERSIBLE DISSOLVE ONE TABLET BY MOUTH EVERY 8 HOURS AS NEEDED FOR NAUSEA 10 tablet 3   • nicotine (NICODERM) 21 MG/24HR PATCH 24 HR Place 1 Patch on the skin every 24 hours. 28 Patch 0   • nicotine (NICODERM) 14 MG/24HR PATCH 24 HR Place 1 Patch on the skin every 24 hours. 28 Patch 0   • nicotine (NICODERM) 7 MG/24HR PATCH 24 HR Place 1 Patch on the skin every 24 hours. 28 Patch 0   • fluticasone (FLONASE) 50 MCG/ACT nasal spray Administer 1 Spray into affected nostril(S) every day. 16 g 5   • ferrous sulfate 325 (65 Fe) MG EC tablet Take 1 tablet by mouth every day. 30 tablet 5   • Multiple Vitamin (MULTIVITAMIN ADULT PO) Take  by mouth.     • clonazePAM (KLONOPIN) 1 MG Tab Take 0.5 mg by mouth 2 times a day.     • ibuprofen (MOTRIN) 600 MG Tab Take 1 Tab by mouth every 8 hours as needed for Moderate Pain or Headache. 30 Tab 5   • ascorbic acid (VITAMIN C) 500 MG tablet Take 1 Tab by mouth every day. Take at the same time as the iron to help absorption 30 Tab 11     No current facility-administered medications for this visit.       Patient Active Problem List    Diagnosis Date Noted   • Nicotine dependence due to vaping non-tobacco product 07/14/2021   • Weight gain 07/14/2021   • Iron deficiency 02/05/2021   • Seasonal allergies 04/22/2020   • Generalized anxiety disorder with panic attacks 02/19/2020   • Primary insomnia 01/21/2020   • Migraine without aura and without status migrainosus, not intractable 01/21/2020   • Chronic pelvic pain in female 01/21/2020   • Menorrhagia with regular cycle 11/12/2019   • Dyspareunia in female 11/12/2019   • Constipation 11/12/2019   • Moderate episode of recurrent major depressive disorder (HCC) 08/03/2017       Family History   Problem Relation Age of  Onset   • Hyperlipidemia Mother    • Hypertension Father    • Heart Disease Father         valve problem   • Stroke Maternal Grandfather    • Diabetes Maternal Grandfather    • Cancer Brother         lymphoma   • Stroke Maternal Grandmother        She  has a past medical history of Anxiety, Depression, Insomnia, and Migraine.  She  has a past surgical history that includes wrist orif (Left); other orthopedic surgery; ankle orif (Left); and primary c section.       Objective:   Resp 16   Ht 1.524 m (5')   Wt 44.7 kg (98 lb 9.6 oz)   BMI 19.26 kg/m²     Physical Exam:  Constitutional: Alert, no distress, well-groomed.  Skin: No rashes in visible areas.  Eye: Round. Conjunctiva clear, lids normal. No icterus.   ENMT: Lips pink without lesions, good dentition, moist mucous membranes. Phonation normal.  Neck: No masses, no thyromegaly. Moves freely without pain.  Respiratory: Unlabored respiratory effort, no cough or audible wheeze  Psych: Alert and oriented x3, normal affect and mood.       Assessment and Plan:   The following treatment plan was discussed:     1. Nicotine dependence due to vaping non-tobacco product  Will start nicotine replacement as below  - nicotine (NICODERM) 21 MG/24HR PATCH 24 HR; Place 1 Patch on the skin every 24 hours.  Dispense: 28 Patch; Refill: 0  - nicotine (NICODERM) 14 MG/24HR PATCH 24 HR; Place 1 Patch on the skin every 24 hours.  Dispense: 28 Patch; Refill: 0  - nicotine (NICODERM) 7 MG/24HR PATCH 24 HR; Place 1 Patch on the skin every 24 hours.  Dispense: 28 Patch; Refill: 0    2. Weight gain  Question whether could be secondary to Abilify given rapid weight gain with low-dose however patient reports nothing else has changed.  We discussed talking with her psychiatrist to investigate alternative medication    3. Migraine without aura and without status migrainosus, not intractable  Previously well controlled with Topamax, will restart at previous dose  - topiramate (TOPAMAX) 100 MG  Tab; Take 1/2 tab daily for 1 week then 1 full tab daily for headache prevention  Dispense: 30 tablet; Refill: 5  - ondansetron (ZOFRAN ODT) 4 MG TABLET DISPERSIBLE; DISSOLVE ONE TABLET BY MOUTH EVERY 8 HOURS AS NEEDED FOR NAUSEA  Dispense: 10 tablet; Refill: 3        Follow-up: Return if symptoms worsen or fail to improve.

## 2021-07-14 NOTE — ASSESSMENT & PLAN NOTE
She would like to restart her Topamax which she used to take for migraine prevention.  She has been off for several months.  Previously was taking 100 mg daily with good effect.

## 2021-07-14 NOTE — ASSESSMENT & PLAN NOTE
She is currently using flavored nicotine cartridges and a vape pen.  Her cartridges are 5% with 3500 puffs and it takes her about 5 to 6 days to run through cartridge.  She is interested in quitting vaping.  Has never used any nicotine replacement or other smoking cessation medications in the past.

## 2021-07-14 NOTE — ASSESSMENT & PLAN NOTE
She reports starting on Abilify about 2 weeks ago.  In the past week and a half she has noted a 10 pound weight gain.  She typically weighs herself every day.  She denies any lower extremity edema.  She is concerned this is a side effect of the Abilify.  She has also noted nausea with the Abilify but fortunately it is helping with her mental health.

## 2021-07-15 DIAGNOSIS — G43.019 INTRACTABLE MIGRAINE WITHOUT AURA AND WITHOUT STATUS MIGRAINOSUS: ICD-10-CM

## 2021-07-15 RX ORDER — IBUPROFEN 600 MG/1
600 TABLET ORAL EVERY 8 HOURS PRN
Qty: 30 TABLET | Refills: 5 | Status: SHIPPED | OUTPATIENT
Start: 2021-07-15 | End: 2023-02-14

## 2021-08-20 ENCOUNTER — PATIENT MESSAGE (OUTPATIENT)
Dept: MEDICAL GROUP | Facility: MEDICAL CENTER | Age: 41
End: 2021-08-20

## 2021-08-20 DIAGNOSIS — B35.4 TINEA CORPORIS: ICD-10-CM

## 2021-08-20 RX ORDER — PRENATAL VIT 91/IRON/FOLIC/DHA 28-975-200
COMBINATION PACKAGE (EA) ORAL
Qty: 15 G | Refills: 0 | Status: SHIPPED | OUTPATIENT
Start: 2021-08-20 | End: 2022-06-09

## 2022-02-09 ENCOUNTER — TELEMEDICINE (OUTPATIENT)
Dept: MEDICAL GROUP | Facility: MEDICAL CENTER | Age: 42
End: 2022-02-09
Attending: INTERNAL MEDICINE
Payer: MEDICAID

## 2022-02-09 DIAGNOSIS — E61.1 IRON DEFICIENCY: ICD-10-CM

## 2022-02-09 DIAGNOSIS — Z13.1 SCREENING FOR DIABETES MELLITUS: ICD-10-CM

## 2022-02-09 DIAGNOSIS — Z13.220 SCREENING, LIPID: ICD-10-CM

## 2022-02-09 DIAGNOSIS — M54.50 ACUTE BILATERAL LOW BACK PAIN WITHOUT SCIATICA: ICD-10-CM

## 2022-02-09 PROBLEM — R63.5 WEIGHT GAIN: Status: RESOLVED | Noted: 2021-07-14 | Resolved: 2022-02-09

## 2022-02-09 PROCEDURE — 99214 OFFICE O/P EST MOD 30 MIN: CPT | Mod: 95 | Performed by: INTERNAL MEDICINE

## 2022-02-09 RX ORDER — ZOLPIDEM TARTRATE 5 MG/1
TABLET ORAL
COMMUNITY
Start: 2022-01-18 | End: 2022-03-25

## 2022-02-09 RX ORDER — METHYLPREDNISOLONE 4 MG/1
TABLET ORAL
Qty: 21 TABLET | Refills: 0 | Status: SHIPPED
Start: 2022-02-09 | End: 2022-06-09

## 2022-02-09 RX ORDER — LAMOTRIGINE 25 MG/1
TABLET ORAL
COMMUNITY
Start: 2022-01-07 | End: 2022-06-09

## 2022-02-09 RX ORDER — CYCLOBENZAPRINE HCL 10 MG
10 TABLET ORAL 3 TIMES DAILY PRN
Qty: 45 TABLET | Refills: 0 | Status: SHIPPED
Start: 2022-02-09 | End: 2022-06-09

## 2022-02-09 RX ORDER — MELOXICAM 15 MG/1
15 TABLET ORAL DAILY
Qty: 30 TABLET | Refills: 0 | Status: SHIPPED
Start: 2022-02-09 | End: 2022-06-09

## 2022-02-09 RX ORDER — OLANZAPINE 10 MG/1
10 TABLET ORAL
COMMUNITY
Start: 2022-01-21 | End: 2022-11-04

## 2022-02-09 NOTE — ASSESSMENT & PLAN NOTE
She reports that approximately 3 weeks ago she had a fall and hurt her back.  She went to urgent care and was given a Medrol Dosepak and Flexeril.  She took each to completion and was starting to feel better but she recently slipped and fell and landed on the same spot.  She is now having severe pain again.  She reports that the pain is radiating down her legs and it is especially bad in the morning when she first wakes up.  She still able to walk and bear weight.  She did have x-rays done in urgent care and reports that they were normal.

## 2022-02-10 NOTE — ASSESSMENT & PLAN NOTE
She continues to have heavy menses and she reports bleeding twice a month now.  She did follow-up with [facility] in the past and had an IUD put in.  However, she states that she was having headaches every day for a month and then some irregular bleeding.  When they went to check the position of the IUD they could not visualize the strings and she had to have it removed.  She did not get it replaced and she is not currently using anything for birth control.  She prefers to stay off of any birth control at this point if she is able.

## 2022-02-10 NOTE — PROGRESS NOTES
Virtual Visit: Established Patient   This visit was conducted via Zoom using secure and encrypted videoconferencing technology.   The patient was in their home in the [facility].    The patient's identity was confirmed and verbal consent was obtained for this virtual visit.     Subjective:   CC: No chief complaint on file.      Danielle Barcenas is a 41 y.o. female presenting for evaluation and management of:    Acute bilateral low back pain without sciatica  She reports that approximately 3 weeks ago she had a fall and hurt her back.  She went to urgent care and was given a Medrol Dosepak and Flexeril.  She took each to completion and was starting to feel better but she recently slipped and fell and landed on the same spot.  She is now having severe pain again.  She reports that the pain is radiating down her legs and it is especially bad in the morning when she first wakes up.  She still able to walk and bear weight.  She did have x-rays done in urgent care and reports that they were normal.      Iron deficiency  She continues to have heavy menses and she reports bleeding twice a month now.  She did follow-up with [facility] in the past and had an IUD put in.  However, she states that she was having headaches every day for a month and then some irregular bleeding.  When they went to check the position of the IUD they could not visualize the strings and she had to have it removed.  She did not get it replaced and she is not currently using anything for birth control.  She prefers to stay off of any birth control at this point if she is able.           Current medicines (including changes today)  Current Outpatient Medications   Medication Sig Dispense Refill   • methylPREDNISolone (MEDROL DOSEPAK) 4 MG Tablet Therapy Pack Take according to package instructions 21 Tablet 0   • cyclobenzaprine (FLEXERIL) 10 mg Tab Take 1 Tablet by mouth 3 times a day as needed for Moderate Pain or Muscle Spasms. 45 Tablet 0   •  meloxicam (MOBIC) 15 MG tablet Take 1 Tablet by mouth every day. 30 Tablet 0   • lamoTRIgine (LAMICTAL) 25 MG Tab TAKE 3 TABLETS BY MOUTH TWICE DAILY AS DIRECTED     • olanzapine (ZYPREXA) 10 MG tablet Take 10 mg by mouth at bedtime.     • zolpidem (AMBIEN) 5 MG Tab TAKE 2 TABLETS BY MOUTH EVERY NIGHT AS NEEDED     • terbinafine (LAMISIL) 1 % cream Apply thin layer to rash on arm daily for 7 days. 15 g 0   • ibuprofen (MOTRIN) 600 MG Tab Take 1 tablet by mouth every 8 hours as needed for Moderate Pain or Headache. 30 tablet 5   • topiramate (TOPAMAX) 100 MG Tab Take 1/2 tab daily for 1 week then 1 full tab daily for headache prevention 30 tablet 5   • ondansetron (ZOFRAN ODT) 4 MG TABLET DISPERSIBLE DISSOLVE ONE TABLET BY MOUTH EVERY 8 HOURS AS NEEDED FOR NAUSEA 10 tablet 3   • nicotine (NICODERM) 21 MG/24HR PATCH 24 HR Place 1 Patch on the skin every 24 hours. 28 Patch 0   • nicotine (NICODERM) 14 MG/24HR PATCH 24 HR Place 1 Patch on the skin every 24 hours. 28 Patch 0   • nicotine (NICODERM) 7 MG/24HR PATCH 24 HR Place 1 Patch on the skin every 24 hours. 28 Patch 0   • fluticasone (FLONASE) 50 MCG/ACT nasal spray Administer 1 Spray into affected nostril(S) every day. 16 g 5   • ferrous sulfate 325 (65 Fe) MG EC tablet Take 1 tablet by mouth every day. 30 tablet 5   • Multiple Vitamin (MULTIVITAMIN ADULT PO) Take  by mouth.     • clonazePAM (KLONOPIN) 1 MG Tab Take 0.5 mg by mouth 2 times a day.     • ascorbic acid (VITAMIN C) 500 MG tablet Take 1 Tab by mouth every day. Take at the same time as the iron to help absorption 30 Tab 11     No current facility-administered medications for this visit.       Patient Active Problem List    Diagnosis Date Noted   • Acute bilateral low back pain without sciatica 02/09/2022   • Nicotine dependence due to vaping non-tobacco product 07/14/2021   • Iron deficiency 02/05/2021   • Seasonal allergies 04/22/2020   • Generalized anxiety disorder with panic attacks 02/19/2020   •  "Primary insomnia 01/21/2020   • Migraine without aura and without status migrainosus, not intractable 01/21/2020   • Chronic pelvic pain in female 01/21/2020   • Menorrhagia with regular cycle 11/12/2019   • Dyspareunia in female 11/12/2019   • Constipation 11/12/2019   • Moderate episode of recurrent major depressive disorder (HCC) 08/03/2017        Objective:   Height: 5'0\"  Weight: 98 lb  RR: 12    Physical Exam:  Constitutional: Alert, no distress, well-groomed.  Skin: No rashes in visible areas.  Eye: Round. Conjunctiva clear, lids normal. No icterus.   ENMT: Lips pink without lesions, good dentition, moist mucous membranes. Phonation normal.  Neck: No masses, no thyromegaly. Moves freely without pain.  Respiratory: Unlabored respiratory effort, no cough or audible wheeze  Psych: Alert and oriented x3, normal affect and mood.     Assessment and Plan:   The following treatment plan was discussed:     1. Acute bilateral low back pain without sciatica  We will repeat her Medrol Dosepak and refill her Flexeril.  If she is still having pain after the Medrol, instructed her to start the meloxicam as needed.  Discussed that if several weeks past and she is still having discomfort to contact me and I can put in a referral for physical therapy  - methylPREDNISolone (MEDROL DOSEPAK) 4 MG Tablet Therapy Pack; Take according to package instructions  Dispense: 21 Tablet; Refill: 0  - cyclobenzaprine (FLEXERIL) 10 mg Tab; Take 1 Tablet by mouth 3 times a day as needed for Moderate Pain or Muscle Spasms.  Dispense: 45 Tablet; Refill: 0  - meloxicam (MOBIC) 15 MG tablet; Take 1 Tablet by mouth every day.  Dispense: 30 Tablet; Refill: 0    2. Screening, lipid  - Lipid Profile; Future    3. Screening for diabetes mellitus  - HEMOGLOBIN A1C; Future    4. Iron deficiency  With continued menorrhagia, will obtain updated labs.  Taking iron only occasionally.  - CBC WITH DIFFERENTIAL; Future  - FERRITIN; Future  - IRON/TOTAL IRON " BIND; Future      Follow-up: Return if symptoms worsen or fail to improve.

## 2022-03-10 ENCOUNTER — OCCUPATIONAL MEDICINE (OUTPATIENT)
Dept: URGENT CARE | Facility: PHYSICIAN GROUP | Age: 42
End: 2022-03-10
Payer: COMMERCIAL

## 2022-03-10 ENCOUNTER — APPOINTMENT (OUTPATIENT)
Dept: RADIOLOGY | Facility: IMAGING CENTER | Age: 42
End: 2022-03-10
Attending: PHYSICIAN ASSISTANT
Payer: COMMERCIAL

## 2022-03-10 VITALS
SYSTOLIC BLOOD PRESSURE: 110 MMHG | TEMPERATURE: 98.8 F | DIASTOLIC BLOOD PRESSURE: 70 MMHG | HEART RATE: 64 BPM | RESPIRATION RATE: 12 BRPM | HEIGHT: 60 IN | BODY MASS INDEX: 24.54 KG/M2 | OXYGEN SATURATION: 97 % | WEIGHT: 125 LBS

## 2022-03-10 DIAGNOSIS — S66.912A STRAIN OF LEFT WRIST, INITIAL ENCOUNTER: ICD-10-CM

## 2022-03-10 PROCEDURE — 99203 OFFICE O/P NEW LOW 30 MIN: CPT | Performed by: PHYSICIAN ASSISTANT

## 2022-03-10 PROCEDURE — 73110 X-RAY EXAM OF WRIST: CPT | Mod: TC,LT | Performed by: PHYSICIAN ASSISTANT

## 2022-03-10 ASSESSMENT — ENCOUNTER SYMPTOMS
WEAKNESS: 0
MYALGIAS: 1
FALLS: 1
TINGLING: 0

## 2022-03-10 NOTE — LETTER
EMPLOYEE’S CLAIM FOR COMPENSATION/ REPORT OF INITIAL TREATMENT  FORM C-4    EMPLOYEE’S CLAIM - PROVIDE ALL INFORMATION REQUESTED   First Name  Danielle Last Name  Narinder Birthdate                    1980                Sex  female Claim Number (Insurer’s Use Only)    Home Address  [address] Age  41 y.o. 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 (if applicable)  Hanging Clothes, Bar Fell on top of me    How did this injury or occupational disease occur? (Be specific an answer in detail. Use additional sheet if necessary)  I was hanging clothes on a bottom bar and I reached down to get an item and the bar fell on me. I landed with my hands out and hurt my wrist   If you believe that you have an occupational disease, when did you first have knowledge of the disability and it relationship to your employment?  N/A Witnesses to the Accident  Ny Garcia      Nature of Injury or Occupational Disease  Sprain  Part(s) of Body Injured or Affected  Wrist (L) and Hand (L), N/A, N/A    I certify that the above is true and correct to the best of my  knowledge and that I have provided this information in order to obtain the benefits of [place]  I hereby authorize any physician, chiropractor, surgeon, practitioner, or other person, any hospital, including [facility] or [facility], any medical service organization, any insurance company, or other institution or organization to release to each other, any medical or other information, including benefits paid or payable, pertinent to this injury or disease, except information relative to diagnosis, treatment and/or counseling for AIDS, psychological conditions, alcohol or controlled substances, for which I must give specific authorization.  A Photostat of this authorization shall be as valid as the original.     Date 03/10/2022   Place [place]  Employee’s Original or  *Electronic Signature   THIS REPORT MUST BE COMPLETED AND MAILED WITHIN 3 WORKING DAYS OF TREATMENT   Place  [facility]  Name of Facility  [place]   Date  3/10/2022 Diagnosis and Description of Injury or Occupational Disease  (S66.912A) Strain of left wrist, initial encounter Is there evidence the injured employee was under the influence of alcohol and/or another controlled substance at the time of accident?  ? No ? Yes (if yes, please explain)    Hour  12:09 PM   The encounter diagnosis was Strain of left wrist, initial encounter. No   Treatment  Patient is released to restricted duty.  Please allow her to wear left wrist brace at all times while at work.  No lifting greater than 10 pounds with left hand.  X-ray was reviewed with no evidence of new fracture or bony abnormality.  Encouraged ice, elevation and ibuprofen to decrease pain and swelling.  Gradual range of motion exercises encouraged.  Follow-up in clinic for any worsening symptoms.  Return to the clinic on 3/17/2022 for reevaluation.   "Have you advised the patient to remain off work five days or     more?    X-Ray Findings  Negative   ? Yes Indicate dates:   From   To      From information given by the employee, together with medical evidence, can        you directly connect this injury or occupational disease as job incurred?  Yes ? No If no, is the injured employee capable of:  ? full duty  No ? modified duty  Yes   Is additional medical care by a physician indicated?  Yes If Modified Duty, Specify any Limitations / Restrictions  See D-39   Do you know of any previous injury or disease contributing to this condition or occupational disease?  ? Yes ? No (Explain if yes)                          No   Date  3/10/2022 Print Health Care Provider's   Waylon Mckeon P.A.-C. I certify the employer’s copy of  this form was mailed on:   Address  [address] [place] [address]. [address] Insurer’s Use Only     [facility] Zip  [address]    Provider’s Tax ID Number  [id number] Telephone  Dept: [phone]             Health Care Provider’s Original or Electronic Signature  e-WAYLON Caruso P.A.-C. Degree (MD,DO, DC,PAJenelleC,APRN)   PA-C      * Complete and attach Release of Information (Form C-4A) when injured employee signs C-4 Form electronically  ORIGINAL - TREATING HEALTHCARE PROVIDER PAGE 2 - INSURER/TPA PAGE 3 - EMPLOYER PAGE 4 - EMPLOYEE             Form C-4 (rev.08/21)           BRIEF DESCRIPTION OF RIGHTS AND BENEFITS  (Pursuant to [place] 616C.050)    Notice of Injury or Occupational Disease (Incident Report Form C-1): If an injury or occupational disease (OD) arises out of and in the course of employment, you must provide written notice to your employer as soon as practicable, but no later than 7 days after the accident or OD. Your employer shall maintain a sufficient supply of the required forms.    Claim for Compensation (Form C-4): If medical treatment is sought, the form C-4 is available at the place of initial treatment. A completed \"Claim for " "Compensation\" (Form C-4) must be filed within 90 days after an accident or OD. The treating physician or chiropractor must, within 3 working days after treatment, complete and mail to the employer, the employer's insurer and third-party , the Claim for Compensation.    Medical Treatment: If you require medical treatment for your on-the-job injury or OD, you may be required to select a physician or chiropractor from a list provided by your workers’ compensation insurer, if it has contracted with an Organization for Managed Care (MCO) or Preferred Provider Organization (PPO) or providers of health care. If your employer has not entered into a contract with an MCO or PPO, you may select a physician or chiropractor from the Panel of Physicians and Chiropractors. Any medical costs related to your industrial injury or OD will be paid by your insurer.    Temporary Total Disability (TTD): If your doctor has certified that you are unable to work for a period of at least 5 consecutive days, or 5 cumulative days in a 20-day period, or places restrictions on you that your employer does not accommodate, you may be entitled to TTD compensation.    Temporary Partial Disability (TPD): If the wage you receive upon reemployment is less than the compensation for TTD to which you are entitled, the insurer may be required to pay you TPD compensation to make up the difference. TPD can only be paid for a maximum of 24 months.    Permanent Partial Disability (PPD): When your medical condition is stable and there is an indication of a PPD as a result of your injury or OD, within 30 days, your insurer must arrange for an evaluation by a rating physician or chiropractor to determine the degree of your PPD. The amount of your PPD award depends on the date of injury, the results of the PPD evaluation, your age and wage.    Permanent Total Disability (PTD): If you are medically certified by a treating physician or chiropractor as " permanently and totally disabled and have been granted a PTD status by your insurer, you are entitled to receive monthly benefits not to exceed 66 2/3% of your average monthly wage. The amount of your PTD payments is subject to reduction if you previously received a lump-sum PPD award.    Vocational Rehabilitation Services: You may be eligible for vocational rehabilitation services if you are unable to return to the job due to a permanent physical impairment or permanent restrictions as a result of your injury or occupational disease.    Transportation and Per Jyoti Reimbursement: You may be eligible for travel expenses and per jyoti associated with medical treatment.    Reopening: You may be able to reopen your claim if your condition worsens after claim closure.     Appeal Process: If you disagree with a written determination issued by the insurer or the insurer does not respond to your request, you may appeal to the Department of Administration, , by following the instructions contained in your determination letter. You must appeal the determination within 70 days from the date of the determination letter at [address], [address], [place] [address], or [address], [place] [address]. If you disagree with the  decision, you may appeal to the Department of Administration, . You must file your appeal within 30 days from the date of the  decision letter at [address], [address], [place], [place] [address], or [address] [facility] [address], [place] [address]. If you disagree with a decision of an , you may file a petition for judicial review with the District Court. You must do so within 30 days of the Appeal Officer’s decision. You may be represented by an  at your own expense or you may contact the [facility] for possible representation.    [place]  for Injured Workers  (NAIW): If you disagree with a  decision, you may request that NAIW represent you without charge at an  Hearing. For information regarding denial of benefits, you may contact the [facility] at: [address] [place], [address], [place] [address], [phone], or [address], [address], [place] [address], [phone]    To File a Complaint with the Division: If you wish to file a complaint with the  of the Division of Industrial Relations (DIR),  please contact the Workers’ Compensation Section, [address], [address], [place], [place] [address], telephone (144) [phone], or [address], [address], [place], [place] [address], telephone (723) [phone].    For assistance with Workers’ Compensation Issues: You may contact the [facility] Office for Consumer Health Assistance, [address] [facility] [address], [place], [place] [address], Toll Free 1-852.484.6998, Web site: http://[facility].[place].gov/Programs/LISA E-mail: lisa@[facility].nv.[facility]              __________________________________________________________________                                    _________________            Employee Name / Signature                                                                                                                            Date                                                                                                                                                                                                                              D-2 (rev. 10/20)

## 2022-03-10 NOTE — PROGRESS NOTES
Subjective     Danielle Barcenas is a 41 y.o. female who presents with Hand Injury (NEW WC  was hanging clothes and bar on top of shelf fell on top of left hand wrist and been having pain and unable to move wrist as much )      HPI:  DOI: 3/10/22  RACIEL:  This is a very pleasant 41-year-old female presenting to the clinic after sustaining a work-related injury.  The patient works at [employer].  She states she was hanging close on a metal close .  The bar ended up falling and hitting her in the back of the head.  This caused her to lose her balance and fall forward.  She caught herself on an outstretched left arm.  Sustained immediate pain to the ulnar aspect of the left wrist and hand.  Has noticed mild swelling.  Pain is aggravated with any palpation or movement of the hand or wrist.  No numbness or tingling distally.  Previous distal radial fracture with ORIF as well as ulnar styloid fracture.  Does not have a second job.  Patient is right-hand dominant.    PMH:   No pertinent past medical history to this problem  MEDS:  Medications were reviewed in EMR  ALLERGIES:  Allergies were reviewed in EMR  FH:   No pertinent family history to this problem       Review of Systems   Musculoskeletal: Positive for falls, joint pain and myalgias.   Neurological: Negative for tingling and weakness.              Objective     /70 (BP Location: Right arm, Patient Position: Sitting, BP Cuff Size: Adult)   Pulse 64   Temp 37.1 °C (98.8 °F) (Temporal)   Resp 12   Ht 1.524 m (5')   Wt 56.7 kg (125 lb)   SpO2 97%   BMI 24.41 kg/m²      Physical Exam    Constitutional: Pt is oriented to person, place, and time.  Appears well-developed and well-nourished. No distress.    Eyes: Conjunctivae are normal.   Cardiovascular: Normal rate.    Pulmonary/Chest: Effort normal.   Musculoskeletal: Left wrist: No obvious deformity, discoloration or effusion.  Patient has tenderness diffusely over the ulnar aspect of the  wrist.  Range of motion is limited in all directions at this time due to pain.   strength 4/5 as compared to the right hand.  Sensation intact distally.  Neurological: Pt is alert and oriented to person, place, and time. Coordination normal.   Skin: Skin is warm. Pt is not diaphoretic. No erythema.   Psychiatric: Pt has a normal mood and affect.  Behavior is normal.        RADIOLOGY RESULTS   DX-WRIST-COMPLETE 3+ LEFT    Result Date: 3/10/2022  3/10/2022 12:22 PM HISTORY/REASON FOR EXAM:  Pain/Deformity Following Trauma. TECHNIQUE/EXAM DESCRIPTION AND NUMBER OF VIEWS:  4 views of the LEFT wrist. COMPARISON: 10/1/2016 FINDINGS: Healed distal radius fracture status post ORIF with plate and screw fixation. Displaced ulnar styloid process fracture again noted. There is no fracture or dislocation. The visualized osseous structures are in anatomic alignment. The joint spaces are preserved. Bone mineralization is age-appropriate..     No acute fracture or dislocation. No significant interval change.                Assessment & Plan        1. Strain of left wrist, initial encounter    - DX-WRIST-COMPLETE 3+ LEFT; Future    Patient is released to restricted duty.  Please allow her to wear left wrist brace at all times while at work.  No lifting greater than 10 pounds with left hand.  X-ray was reviewed with no evidence of new fracture or bony abnormality.  Encouraged ice, elevation and ibuprofen to decrease pain and swelling.  Gradual range of motion exercises encouraged.  Follow-up in clinic for any worsening symptoms.  Return to the clinic on 3/17/2022 for reevaluation.    Differential diagnosis, natural history, supportive care, and indications for immediate follow-up discussed at length.

## 2022-03-10 NOTE — LETTER
[facility]  [address] [place] [address]. [address] - [place] [address]  Phone:  [phone] - Fax:  [phone]   [facility] Progress Report and Disability Certification  Date of Service: 3/10/2022   No Show:  No  Date / Time of Next Visit: 3/17/2022   Claim Information   Patient Name: Danielle Barcenas  Claim Number:     Employer:   [employer] Date of Injury: 3/10/2022     Insurer / TPA:    ID / SSN:     Occupation: /Seller  Diagnosis: The encounter diagnosis was Strain of left wrist, initial encounter.    Medical Information   Related to Industrial Injury? Yes    Subjective Complaints:  HPI:  DOI: 3/10/22  RACIEL:  This is a very pleasant 41-year-old female presenting to the clinic after sustaining a work-related injury.  The patient works at [employer].  She states she was hanging close on a metal close .  The bar ended up falling and hitting her in the back of the head.  This caused her to lose her balance and fall forward.  She caught herself on an outstretched left arm.  Sustained immediate pain to the ulnar aspect of the left wrist and hand.  Has noticed mild swelling.  Pain is aggravated with any palpation or movement of the hand or wrist.  No numbness or tingling distally.  Previous distal radial fracture with ORIF as well as ulnar styloid fracture.  Does not have a second job.  Patient is right-hand dominant.    PMH:   No pertinent past medical history to this problem  MEDS:  Medications were reviewed in EMR  ALLERGIES:  Allergies were reviewed in EMR  FH:   No pertinent family history to this problem     Objective Findings: Constitutional: Pt is oriented to person, place, and time.  Appears well-developed and well-nourished. No distress.    Eyes: Conjunctivae are normal.   Cardiovascular: Normal rate.    Pulmonary/Chest: Effort normal.   Musculoskeletal: Left wrist: No obvious deformity, discoloration or effusion.  Patient has  tenderness diffusely over the ulnar aspect of the wrist.  Range of motion is limited in all directions at this time due to pain.   strength 4/5 as compared to the right hand.  Sensation intact distally.  Neurological: Pt is alert and oriented to person, place, and time. Coordination normal.   Skin: Skin is warm. Pt is not diaphoretic. No erythema.   Psychiatric: Pt has a normal mood and affect.  Behavior is normal.      Pre-Existing Condition(s):     Assessment:   Initial Visit    Status: Additional Care Required  Permanent Disability:No    Plan:      Diagnostics: X-ray    Comments:  No evidence of fracture or bony abnormality appreciated on x-ray.  Previous surgical hardware present.    Disability Information   Status: Released to Restricted Duty    From:  3/10/2022  Through: 3/17/2022 Restrictions are: Temporary   Physical Restrictions   Sitting:    Standing:    Stooping:    Bending:      Squatting:    Walking:    Climbing:    Pushing:      Pulling:    Other:    Reaching Above Shoulder (L):   Reaching Above Shoulder (R):       Reaching Below Shoulder (L):    Reaching Below Shoulder (R):      Not to exceed Weight Limits   Carrying(hrs):   Weight Limit(lb):   Lifting(hrs):   Weight  Limit(lb):     Comments: Patient is released to restricted duty.  Please allow her to wear left wrist brace at all times while at work.  No lifting greater than 10 pounds with left hand.  X-ray was reviewed with no evidence of new fracture or bony abnormality.  Encouraged ice, elevation and ibuprofen to decrease pain and swelling.  Gradual range of motion exercises encouraged.  Follow-up in clinic for any worsening symptoms.  Return to the clinic on 3/17/2022 for reevaluation.    Repetitive Actions   Hands: i.e. Fine Manipulations from Grasping:     Feet: i.e. Operating Foot Controls:     Driving / Operate Machinery:     Health Care Provider’s Original or Electronic Signature  Pavel Mckeon P.A.-C. Health Care Provider’s Original or  Electronic Signature    Chris Mirza MD         Clinic Name / Location: [address]  [place] [address] Clinic Phone Number: Dept: [phone]   Appointment Time: 11:40 Am Visit Start Time: 12:09 PM   Check-In Time:  12:01 Pm Visit Discharge Time:  12:58PM    Original-Treating Physician or Chiropractor    Page 2-Insurer/TPA    Page 3-Employer    Page 4-Employee

## 2022-03-17 ENCOUNTER — PATIENT MESSAGE (OUTPATIENT)
Dept: MEDICAL GROUP | Facility: MEDICAL CENTER | Age: 42
End: 2022-03-17
Payer: MEDICAID

## 2022-03-17 DIAGNOSIS — J30.2 SEASONAL ALLERGIES: ICD-10-CM

## 2022-03-17 RX ORDER — FLUTICASONE PROPIONATE 50 MCG
1 SPRAY, SUSPENSION (ML) NASAL DAILY
Qty: 16 G | Refills: 5 | Status: SHIPPED | OUTPATIENT
Start: 2022-03-17 | End: 2022-06-09 | Stop reason: SDUPTHER

## 2022-03-17 NOTE — TELEPHONE ENCOUNTER
From: Danielle Barcenas  To: Physician Josefina De Leon  Sent: 3/17/2022 9:41 AM PDT  Subject: New Pharmacy     Hi, I am needing a refill of my allergy nasal spray, Flonase. I just changed my pharmacy to [facility] on [facility] so I will need it to be sent there please.    Thank you!

## 2022-03-18 ENCOUNTER — OCCUPATIONAL MEDICINE (OUTPATIENT)
Dept: URGENT CARE | Facility: PHYSICIAN GROUP | Age: 42
End: 2022-03-18
Payer: COMMERCIAL

## 2022-03-18 ENCOUNTER — HOSPITAL ENCOUNTER (OUTPATIENT)
Dept: LAB | Facility: MEDICAL CENTER | Age: 42
End: 2022-03-18
Attending: INTERNAL MEDICINE
Payer: MEDICAID

## 2022-03-18 VITALS
WEIGHT: 125 LBS | BODY MASS INDEX: 24.54 KG/M2 | HEART RATE: 60 BPM | HEIGHT: 60 IN | RESPIRATION RATE: 12 BRPM | SYSTOLIC BLOOD PRESSURE: 130 MMHG | TEMPERATURE: 98.9 F | DIASTOLIC BLOOD PRESSURE: 82 MMHG | OXYGEN SATURATION: 98 %

## 2022-03-18 DIAGNOSIS — S66.912D STRAIN OF LEFT WRIST, SUBSEQUENT ENCOUNTER: ICD-10-CM

## 2022-03-18 DIAGNOSIS — E61.1 IRON DEFICIENCY: ICD-10-CM

## 2022-03-18 DIAGNOSIS — Z13.1 SCREENING FOR DIABETES MELLITUS: ICD-10-CM

## 2022-03-18 DIAGNOSIS — Z13.220 SCREENING, LIPID: ICD-10-CM

## 2022-03-18 LAB
BASOPHILS # BLD AUTO: 0.3 % (ref 0–1.8)
BASOPHILS # BLD: 0.02 K/UL (ref 0–0.12)
CHOLEST SERPL-MCNC: 254 MG/DL (ref 100–199)
EOSINOPHIL # BLD AUTO: 0.09 K/UL (ref 0–0.51)
EOSINOPHIL NFR BLD: 1.4 % (ref 0–6.9)
ERYTHROCYTE [DISTWIDTH] IN BLOOD BY AUTOMATED COUNT: 41.2 FL (ref 35.9–50)
EST. AVERAGE GLUCOSE BLD GHB EST-MCNC: 105 MG/DL
FASTING STATUS PATIENT QL REPORTED: NORMAL
FERRITIN SERPL-MCNC: 75.2 NG/ML (ref 10–291)
HBA1C MFR BLD: 5.3 % (ref 4–5.6)
HCT VFR BLD AUTO: 41.6 % (ref 37–47)
HDLC SERPL-MCNC: 62 MG/DL
HGB BLD-MCNC: 13.7 G/DL (ref 12–16)
IMM GRANULOCYTES # BLD AUTO: 0.03 K/UL (ref 0–0.11)
IMM GRANULOCYTES NFR BLD AUTO: 0.5 % (ref 0–0.9)
IRON SATN MFR SERPL: 23 % (ref 15–55)
IRON SERPL-MCNC: 71 UG/DL (ref 40–170)
LDLC SERPL CALC-MCNC: 159 MG/DL
LYMPHOCYTES # BLD AUTO: 1.43 K/UL (ref 1–4.8)
LYMPHOCYTES NFR BLD: 21.5 % (ref 22–41)
MCH RBC QN AUTO: 29.3 PG (ref 27–33)
MCHC RBC AUTO-ENTMCNC: 32.9 G/DL (ref 33.6–35)
MCV RBC AUTO: 88.9 FL (ref 81.4–97.8)
MONOCYTES # BLD AUTO: 0.35 K/UL (ref 0–0.85)
MONOCYTES NFR BLD AUTO: 5.3 % (ref 0–13.4)
NEUTROPHILS # BLD AUTO: 4.74 K/UL (ref 2–7.15)
NEUTROPHILS NFR BLD: 71 % (ref 44–72)
NRBC # BLD AUTO: 0 K/UL
NRBC BLD-RTO: 0 /100 WBC
PLATELET # BLD AUTO: 283 K/UL (ref 164–446)
PMV BLD AUTO: 10.3 FL (ref 9–12.9)
RBC # BLD AUTO: 4.68 M/UL (ref 4.2–5.4)
TIBC SERPL-MCNC: 308 UG/DL (ref 250–450)
TRIGL SERPL-MCNC: 166 MG/DL (ref 0–149)
UIBC SERPL-MCNC: 237 UG/DL (ref 110–370)
WBC # BLD AUTO: 6.7 K/UL (ref 4.8–10.8)

## 2022-03-18 PROCEDURE — 80061 LIPID PANEL: CPT

## 2022-03-18 PROCEDURE — 83540 ASSAY OF IRON: CPT

## 2022-03-18 PROCEDURE — 36415 COLL VENOUS BLD VENIPUNCTURE: CPT

## 2022-03-18 PROCEDURE — 83550 IRON BINDING TEST: CPT

## 2022-03-18 PROCEDURE — 99213 OFFICE O/P EST LOW 20 MIN: CPT | Performed by: NURSE PRACTITIONER

## 2022-03-18 PROCEDURE — 83036 HEMOGLOBIN GLYCOSYLATED A1C: CPT

## 2022-03-18 PROCEDURE — 85025 COMPLETE CBC W/AUTO DIFF WBC: CPT

## 2022-03-18 PROCEDURE — 82728 ASSAY OF FERRITIN: CPT

## 2022-03-18 RX ORDER — ZOLPIDEM TARTRATE 10 MG/1
TABLET ORAL
COMMUNITY
Start: 2022-03-10 | End: 2022-03-25

## 2022-03-18 NOTE — PROGRESS NOTES
Subjective:   Danielle Barcenas is a 41 y.o. female who presents for Follow-Up (WC f/u for left wrist, wrist is still sore )       HPI  HPI:  DOI: 3/10/22  RACIEL:  This is a very pleasant 41-year-old female presenting to the clinic after sustaining a work-related injury.  The patient works at [employer].  She states she was hanging close on a metal close .  The bar ended up falling and hitting her in the back of the head.  This caused her to lose her balance and fall forward.  She caught herself on an outstretched left arm.  Sustained immediate pain to the ulnar aspect of the left wrist and hand.  Has noticed mild swelling.  Pain is aggravated with any palpation or movement of the hand or wrist.  No numbness or tingling distally.  Previous distal radial fracture with ORIF as well as ulnar styloid fracture.  Does not have a second job.  Patient is right-hand dominant.       Visit #2-patient returns for follow-up office visit.  States that her wrist is mildly improved, continues to have pain with supination and pronation, as well as dorsiflexion.  Has continued to wear left wrist brace most of the time.  Has been taking Tylenol and Advil as tolerated, although it has given her an upset stomach when she takes without meals.  Patient has not been back at work since time of injury.    PMH:               No pertinent past medical history to this problem  MEDS:             Medications were reviewed in EMR  ALLERGIES:   Allergies were reviewed in EMR  FH:                  No pertinent family history to this problem    ROS  All other systems are negative except as documented above within HPI.    MEDS:   Current Outpatient Medications:   •  fluticasone (FLONASE) 50 MCG/ACT nasal spray, Administer 1 Spray into affected nostril(S) every day., Disp: 16 g, Rfl: 5  •  lamoTRIgine (LAMICTAL) 25 MG Tab, TAKE 3 TABLETS BY MOUTH TWICE DAILY AS DIRECTED, Disp: , Rfl:   •  olanzapine (ZYPREXA) 10 MG tablet, Take 10 mg by  "mouth at bedtime., Disp: , Rfl:   •  zolpidem (AMBIEN) 5 MG Tab, TAKE 2 TABLETS BY MOUTH EVERY NIGHT AS NEEDED, Disp: , Rfl:   •  methylPREDNISolone (MEDROL DOSEPAK) 4 MG Tablet Therapy Pack, Take according to package instructions, Disp: 21 Tablet, Rfl: 0  •  cyclobenzaprine (FLEXERIL) 10 mg Tab, Take 1 Tablet by mouth 3 times a day as needed for Moderate Pain or Muscle Spasms., Disp: 45 Tablet, Rfl: 0  •  meloxicam (MOBIC) 15 MG tablet, Take 1 Tablet by mouth every day., Disp: 30 Tablet, Rfl: 0  •  terbinafine (LAMISIL) 1 % cream, Apply thin layer to rash on arm daily for 7 days., Disp: 15 g, Rfl: 0  •  ibuprofen (MOTRIN) 600 MG Tab, Take 1 tablet by mouth every 8 hours as needed for Moderate Pain or Headache., Disp: 30 tablet, Rfl: 5  •  topiramate (TOPAMAX) 100 MG Tab, Take 1/2 tab daily for 1 week then 1 full tab daily for headache prevention, Disp: 30 tablet, Rfl: 5  •  ondansetron (ZOFRAN ODT) 4 MG TABLET DISPERSIBLE, DISSOLVE ONE TABLET BY MOUTH EVERY 8 HOURS AS NEEDED FOR NAUSEA, Disp: 10 tablet, Rfl: 3  •  nicotine (NICODERM) 21 MG/24HR PATCH 24 HR, Place 1 Patch on the skin every 24 hours., Disp: 28 Patch, Rfl: 0  •  nicotine (NICODERM) 14 MG/24HR PATCH 24 HR, Place 1 Patch on the skin every 24 hours., Disp: 28 Patch, Rfl: 0  •  nicotine (NICODERM) 7 MG/24HR PATCH 24 HR, Place 1 Patch on the skin every 24 hours., Disp: 28 Patch, Rfl: 0  •  ferrous sulfate 325 (65 Fe) MG EC tablet, Take 1 tablet by mouth every day., Disp: 30 tablet, Rfl: 5  •  Multiple Vitamin (MULTIVITAMIN ADULT PO), Take  by mouth., Disp: , Rfl:   •  clonazePAM (KLONOPIN) 1 MG Tab, Take 0.5 mg by mouth 2 times a day., Disp: , Rfl:   •  ascorbic acid (VITAMIN C) 500 MG tablet, Take 1 Tab by mouth every day. Take at the same time as the iron to help absorption, Disp: 30 Tab, Rfl: 11  ALLERGIES:   Allergies   Allergen Reactions   • Toradol Rash     Pt states \"first time I got a rash, second time I was throwing up\"       Patient's PMH, SocHx, " SurgHx, FamHx, Drug allergies and medications were reviewed.     Objective:   /82 (BP Location: Right arm, Patient Position: Sitting, BP Cuff Size: Adult)   Pulse 60   Temp 37.2 °C (98.9 °F) (Temporal)   Resp 12   Ht 1.524 m (5')   Wt 56.7 kg (125 lb)   SpO2 98%   BMI 24.41 kg/m²     Physical Exam  Vitals and nursing note reviewed.   Constitutional:       General: She is awake.      Appearance: Normal appearance. She is well-developed.   HENT:      Head: Normocephalic and atraumatic.      Right Ear: External ear normal.      Left Ear: External ear normal.      Nose: Nose normal.      Mouth/Throat:      Mouth: Mucous membranes are moist.      Pharynx: Oropharynx is clear.   Eyes:      Extraocular Movements: Extraocular movements intact.      Conjunctiva/sclera: Conjunctivae normal.   Cardiovascular:      Rate and Rhythm: Normal rate and regular rhythm.   Pulmonary:      Effort: Pulmonary effort is normal.      Breath sounds: Normal breath sounds.   Musculoskeletal:         General: Normal range of motion.      Cervical back: Normal range of motion and neck supple.      Comments: Left wrist: No obvious deformity, discoloration or effusion, tenderness over the ulnar aspect of the wrist.  Range of motion is limited in all directions at this time due to pain.   strength 4/5 as compared to the right hand. Distal N/V intact.    Skin:     General: Skin is warm and dry.   Neurological:      Mental Status: She is alert and oriented to person, place, and time.   Psychiatric:         Mood and Affect: Mood normal.         Behavior: Behavior normal.         Thought Content: Thought content normal.         Assessment/Plan:   Assessment    1. Strain of left wrist, subsequent encounter    See D39.  Continue left wrist brace use as much as possible.  Perform gentle range of motion exercises as tolerated.  Continue alternating Tylenol and Advil, trial adding topical anti-inflammatory cream.  Follow-up in 1 week,  sooner with any worsening symptoms.      Please note that this dictation was created using voice recognition software. I have made a reasonable attempt to correct obvious errors, but I expect that there are errors of grammar and possibly content that I did not discover before finalizing the note.

## 2022-03-18 NOTE — LETTER
[facility]  [address] [place] [address]. [address] - [place] [address]  Phone:  [phone] - Fax:  [phone]   [facility] Progress Report and Disability Certification  Date of Service: 3/18/2022   No Show:  No  Date / Time of Next Visit: 3/25/2022@11:30 AM   Claim Information   Patient Name: Danielle Barcenas  Claim Number:     Employer:  [employer]  Date of Injury: 3/10/2022     Insurer / TPA:    ID / SSN:     Occupation: /Seller  Diagnosis: The encounter diagnosis was Strain of left wrist, subsequent encounter.    Medical Information   Related to Industrial Injury? Yes    Subjective Complaints:  HPI:  DOI: 3/10/22  RACIEL:  This is a very pleasant 41-year-old female presenting to the clinic after sustaining a work-related injury.  The patient works at [employer].  She states she was hanging close on a metal close .  The bar ended up falling and hitting her in the back of the head.  This caused her to lose her balance and fall forward.  She caught herself on an outstretched left arm.  Sustained immediate pain to the ulnar aspect of the left wrist and hand.  Has noticed mild swelling.  Pain is aggravated with any palpation or movement of the hand or wrist.  No numbness or tingling distally.  Previous distal radial fracture with ORIF as well as ulnar styloid fracture.  Does not have a second job.  Patient is right-hand dominant.       Visit #2-patient returns for follow-up office visit.  States that her wrist is mildly improved, continues to have pain with supination and pronation, as well as dorsiflexion.  Has continued to wear left wrist brace most of the time.  Has been taking Tylenol and Advil as tolerated, although it has given her an upset stomach when she takes without meals.  Patient has not been back at work since time of injury.    PMH:               No pertinent past medical history to this problem  MEDS:             Medications were  reviewed in EMR  ALLERGIES:   Allergies were reviewed in EMR  FH:                  No pertinent family history to this problem   Objective Findings: Left wrist: No obvious deformity, discoloration or effusion, tenderness over the ulnar aspect of the wrist.  Range of motion is limited in all directions at this time due to pain.   strength 4/5 as compared to the right hand. Distal N/V intact.    Pre-Existing Condition(s):     Assessment:   Condition Same    Status: Additional Care Required  Permanent Disability:No    Plan:      Diagnostics:      Comments:       Disability Information   Status: Released to Restricted Duty    From:  3/18/2022  Through: 3/25/2022 Restrictions are: Temporary   Physical Restrictions   Sitting:    Standing:    Stooping:    Bending:      Squatting:    Walking:    Climbing:    Pushing:      Pulling:    Other:    Reaching Above Shoulder (L):   Reaching Above Shoulder (R):       Reaching Below Shoulder (L):    Reaching Below Shoulder (R):      Not to exceed Weight Limits   Carrying(hrs):   Weight Limit(lb): < or = to 10 pounds Lifting(hrs):   Weight  Limit(lb): < or = to 10 pounds   Comments: Patient needs my left wrist brace at all times.  Weight restrictions apply to left wrist.  Continue alternating Tylenol and Advil, add on over-the-counter topical anti-inflammatory cream.    Repetitive Actions   Hands: i.e. Fine Manipulations from Grasping:     Feet: i.e. Operating Foot Controls:     Driving / Operate Machinery:     Health Care Provider’s Original or Electronic Signature  SIMONE Michel Health Care Provider’s Original or Electronic Signature    Chris Mirza MD         Clinic Name / Location: [address]  [place] [address] Clinic Phone Number: Dept: [phone]   Appointment Time: 11:00 Am Visit Start Time: 11:55 AM   Check-In Time:  10:58 Am Visit Discharge Time: 12:43 PM    Original-Treating Physician or Chiropractor     Page 2-Insurer/TPA    Page 3-Employer    Page 4-Employee

## 2022-03-22 PROBLEM — M54.50 ACUTE BILATERAL LOW BACK PAIN WITHOUT SCIATICA: Status: RESOLVED | Noted: 2022-02-09 | Resolved: 2022-03-22

## 2022-03-22 PROBLEM — E61.1 IRON DEFICIENCY: Status: RESOLVED | Noted: 2021-02-05 | Resolved: 2022-03-22

## 2022-03-22 PROBLEM — E78.2 MODERATE MIXED HYPERLIPIDEMIA NOT REQUIRING STATIN THERAPY: Status: ACTIVE | Noted: 2022-03-22

## 2022-03-25 ENCOUNTER — OCCUPATIONAL MEDICINE (OUTPATIENT)
Dept: URGENT CARE | Facility: PHYSICIAN GROUP | Age: 42
End: 2022-03-25
Payer: COMMERCIAL

## 2022-03-25 VITALS
WEIGHT: 125 LBS | DIASTOLIC BLOOD PRESSURE: 64 MMHG | RESPIRATION RATE: 16 BRPM | BODY MASS INDEX: 24.54 KG/M2 | SYSTOLIC BLOOD PRESSURE: 102 MMHG | OXYGEN SATURATION: 98 % | TEMPERATURE: 98.2 F | HEART RATE: 60 BPM | HEIGHT: 60 IN
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
## 2022-03-25 DIAGNOSIS — S66.912D STRAIN OF LEFT WRIST, SUBSEQUENT ENCOUNTER: ICD-10-CM

## 2022-03-25 PROCEDURE — 99213 OFFICE O/P EST LOW 20 MIN: CPT | Performed by: STUDENT IN AN ORGANIZED HEALTH CARE EDUCATION/TRAINING PROGRAM

## 2022-03-25 RX ORDER — TRAZODONE HYDROCHLORIDE 100 MG/1
TABLET ORAL
COMMUNITY
Start: 2022-03-21 | End: 2022-11-04 | Stop reason: SDUPTHER

## 2022-03-25 NOTE — PROGRESS NOTES
Subjective:     Danielle Barcenas is a 41 y.o. female who presents for Follow-Up (Work comp follow up.  Wrist sprain.  DOI 3/10/22)      DOI: 3/10/22  RACIEL:  This is a very pleasant 41-year-old female presenting to the clinic after sustaining a work-related injury.  The patient works at [employer].  She states she was hanging close on a metal close .  The bar ended up falling and hitting her in the back of the head.  This caused her to lose her balance and fall forward.  She caught herself on an outstretched left arm.  Sustained immediate pain to the ulnar aspect of the left wrist and hand.  Has noticed mild swelling.  Pain is aggravated with any palpation or movement of the hand or wrist.  No numbness or tingling distally.  Previous distal radial fracture with ORIF as well as ulnar styloid fracture.  Does not have a second job.  Patient is right-hand dominant.        Visit #2-patient returns for follow-up office visit.  States that her wrist is mildly improved, continues to have pain with supination and pronation, as well as dorsiflexion.  Has continued to wear left wrist brace most of the time.  Has been taking Tylenol and Advil as tolerated, although it has given her an upset stomach when she takes without meals.  Patient has not been back at work since time of injury.    Visit #3: Today's date = 3/25/2022.  Patient reports he is approximately 85% better.  She has been wearing a wrist brace which seems to help.  She is also been doing home exercises which have been helping.  Says she continues to have discomfort with twisting motions of the wrist.  She has been taking Tylenol and ibuprofen which seemed to help.  She has been on a 10 pound weight limit at work which has been appropriate for her.    PMH:   No pertinent past medical history to this problem  MEDS:  Medications were reviewed in EMR  ALLERGIES:  Allergies were reviewed in EMR  FH:   No pertinent family history to this problem        Objective:     /64   Pulse 60   Temp 36.8 °C (98.2 °F) (Temporal)   Resp 16   Ht 1.524 m (5')   Wt 56.7 kg (125 lb)   LMP 03/23/2022 (Exact Date)   SpO2 98%   BMI 24.41 kg/m²     Gen: no acute distress, normal voice  Skin: dry, intact, moist mucosal membranes  Head: Atraumatic, normocephalic  Psych: normal affect, normal judgement, alert, awake  Musculoskeletal: Left wrist: No erythema, ecchymosis or edema.  Full range of motion without any discernible discomfort.  TTP along the dorsal surface of the distal ulna.  Distal sensation motor fully intact.      Assessment/Plan:       1. Strain of left wrist, subsequent encounter    2. Work related injury    Other orders  - traZODone (DESYREL) 100 MG Tab    • Released to Restricted Duty FROM 3/25/2022 TO 4/1/2022  • Patient is approximately 85% better and appropriately improving.  • -Increased weight restrictions of 25 pounds.  See D 39  • -Recommended gradually discontinuing the wrist brace but only using as needed when at work  • -Continue Tylenol/ibuprofen as needed  • -Provided a handout with home stretches and exercises  • -Follow-up in 1 week; will likely DC at that time.  •      Differential diagnosis, natural history, supportive care, and indications for immediate follow-up discussed.

## 2022-03-25 NOTE — LETTER
[facility]  [address] [place] [address]. [address] - [place] [address]  Phone:  [phone] - Fax:  [phone]   [facility] Progress Report and Disability Certification  Date of Service: 3/25/2022   No Show:  No  Date / Time of Next Visit: 4/1/2022   Claim Information   Patient Name: Danielle Barcenas  Claim Number:     Employer:   [employer] Date of Injury: 3/10/2022     Insurer / TPA: [employer]  ID / SSN:     Occupation: /Seller  Diagnosis: Diagnoses of Strain of left wrist, subsequent encounter and Work related injury were pertinent to this visit.    Medical Information   Related to Industrial Injury? Yes    Subjective Complaints:  DOI: 3/10/22  RACIEL:  This is a very pleasant 41-year-old female presenting to the clinic after sustaining a work-related injury.  The patient works at [employer].  She states she was hanging close on a metal close .  The bar ended up falling and hitting her in the back of the head.  This caused her to lose her balance and fall forward.  She caught herself on an outstretched left arm.  Sustained immediate pain to the ulnar aspect of the left wrist and hand.  Has noticed mild swelling.  Pain is aggravated with any palpation or movement of the hand or wrist.  No numbness or tingling distally.  Previous distal radial fracture with ORIF as well as ulnar styloid fracture.  Does not have a second job.  Patient is right-hand dominant.        Visit #2-patient returns for follow-up office visit.  States that her wrist is mildly improved, continues to have pain with supination and pronation, as well as dorsiflexion.  Has continued to wear left wrist brace most of the time.  Has been taking Tylenol and Advil as tolerated, although it has given her an upset stomach when she takes without meals.  Patient has not been back at work since time of injury.    Visit #3: Today's date = 3/25/2022.  Patient reports he is approximately  85% better.  She has been wearing a wrist brace which seems to help.  She is also been doing home exercises which have been helping.  Says she continues to have discomfort with twisting motions of the wrist.  She has been taking Tylenol and ibuprofen which seemed to help.  She has been on a 10 pound weight limit at work which has been appropriate for her.   Objective Findings: Gen: no acute distress, normal voice  Skin: dry, intact, moist mucosal membranes  Head: Atraumatic, normocephalic  Psych: normal affect, normal judgement, alert, awake  Musculoskeletal: Left wrist: No erythema, ecchymosis or edema.  Full range of motion without any discernible discomfort.  TTP along the dorsal surface of the distal ulna.  Distal sensation motor fully intact.     Pre-Existing Condition(s):     Assessment:   Condition Improved    Status: Additional Care Required  Permanent Disability:No    Plan:      Diagnostics:      Comments:       Disability Information   Status: Released to Restricted Duty    From:  3/25/2022  Through: 4/1/2022 Restrictions are: Temporary   Physical Restrictions   Sitting:    Standing:    Stooping:    Bending:      Squatting:    Walking:    Climbing:    Pushing:      Pulling:    Other:    Reaching Above Shoulder (L):   Reaching Above Shoulder (R):       Reaching Below Shoulder (L):    Reaching Below Shoulder (R):      Not to exceed Weight Limits   Carrying(hrs):   Weight Limit(lb): < or = to 25 pounds Lifting(hrs):   Weight  Limit(lb): < or = to 25 pounds   Comments: Patient is approximately 85% better and appropriately improving.  -Increased weight restrictions of 25 pounds.  See D 39  -Recommended gradually discontinuing the wrist brace but only using as needed when at work  -Continue Tylenol/ibuprofen as needed  -Provided a handout with home stretches and exercises  -Follow-up in 1 week; will likely DC at that time.    Repetitive Actions   Hands: i.e. Fine Manipulations from Grasping:     Feet: i.e.  Operating Foot Controls:     Driving / Operate Machinery:     Health Care Provider’s Original or Electronic Signature  Andriy Magdaleno D.O. Health Care Provider’s Original or Electronic Signature    Chris Mirza MD         Clinic Name / Location: [address]  [place] [address] Clinic Phone Number: Dept: [phone]   Appointment Time: 11:30 Am Visit Start Time: 11:39 AM   Check-In Time:  11:26 Am Visit Discharge Time: 12:18PM   Original-Treating Physician or Chiropractor    Page 2-Insurer/TPA    Page 3-Employer    Page 4-Employee

## 2022-03-28 ENCOUNTER — PATIENT MESSAGE (OUTPATIENT)
Dept: MEDICAL GROUP | Facility: MEDICAL CENTER | Age: 42
End: 2022-03-28
Payer: MEDICAID

## 2022-04-27 ENCOUNTER — APPOINTMENT (OUTPATIENT)
Dept: RADIOLOGY | Facility: MEDICAL CENTER | Age: 42
End: 2022-04-27
Attending: INTERNAL MEDICINE
Payer: MEDICAID

## 2022-06-09 ENCOUNTER — OFFICE VISIT (OUTPATIENT)
Dept: MEDICAL GROUP | Facility: MEDICAL CENTER | Age: 42
End: 2022-06-09
Attending: INTERNAL MEDICINE
Payer: MEDICAID

## 2022-06-09 VITALS
HEART RATE: 60 BPM | DIASTOLIC BLOOD PRESSURE: 62 MMHG | RESPIRATION RATE: 16 BRPM | TEMPERATURE: 97.7 F | HEIGHT: 60 IN | SYSTOLIC BLOOD PRESSURE: 98 MMHG | BODY MASS INDEX: 24.54 KG/M2 | OXYGEN SATURATION: 95 % | WEIGHT: 125 LBS

## 2022-06-09 DIAGNOSIS — M54.50 CHRONIC RIGHT-SIDED LOW BACK PAIN WITHOUT SCIATICA: ICD-10-CM

## 2022-06-09 DIAGNOSIS — R35.0 URINARY FREQUENCY: ICD-10-CM

## 2022-06-09 DIAGNOSIS — G89.29 CHRONIC RIGHT-SIDED LOW BACK PAIN WITHOUT SCIATICA: ICD-10-CM

## 2022-06-09 DIAGNOSIS — F17.200 NICOTINE DEPENDENCE DUE TO VAPING NON-TOBACCO PRODUCT: ICD-10-CM

## 2022-06-09 DIAGNOSIS — E78.2 MODERATE MIXED HYPERLIPIDEMIA NOT REQUIRING STATIN THERAPY: ICD-10-CM

## 2022-06-09 DIAGNOSIS — J30.2 SEASONAL ALLERGIES: ICD-10-CM

## 2022-06-09 LAB
APPEARANCE UR: CLEAR
BILIRUB UR STRIP-MCNC: NEGATIVE MG/DL
COLOR UR AUTO: NORMAL
GLUCOSE UR STRIP.AUTO-MCNC: NEGATIVE MG/DL
KETONES UR STRIP.AUTO-MCNC: NEGATIVE MG/DL
LEUKOCYTE ESTERASE UR QL STRIP.AUTO: NEGATIVE
NITRITE UR QL STRIP.AUTO: NEGATIVE
PH UR STRIP.AUTO: 6 [PH] (ref 5–8)
PROT UR QL STRIP: NEGATIVE MG/DL
RBC UR QL AUTO: NEGATIVE
SP GR UR STRIP.AUTO: NORMAL
UROBILINOGEN UR STRIP-MCNC: 0.2 MG/DL

## 2022-06-09 PROCEDURE — 99212 OFFICE O/P EST SF 10 MIN: CPT | Performed by: INTERNAL MEDICINE

## 2022-06-09 PROCEDURE — 81002 URINALYSIS NONAUTO W/O SCOPE: CPT | Performed by: INTERNAL MEDICINE

## 2022-06-09 PROCEDURE — 99214 OFFICE O/P EST MOD 30 MIN: CPT | Performed by: INTERNAL MEDICINE

## 2022-06-09 RX ORDER — TOPIRAMATE 50 MG/1
TABLET, FILM COATED ORAL
COMMUNITY
Start: 2022-05-24 | End: 2022-06-09

## 2022-06-09 RX ORDER — NICOTINE 21 MG/24HR
1 PATCH, TRANSDERMAL 24 HOURS TRANSDERMAL EVERY 24 HOURS
Qty: 28 PATCH | Refills: 0 | Status: SHIPPED
Start: 2022-06-09 | End: 2023-03-15

## 2022-06-09 RX ORDER — FLUTICASONE PROPIONATE 50 MCG
1 SPRAY, SUSPENSION (ML) NASAL DAILY
Qty: 16 G | Refills: 5 | Status: SHIPPED | OUTPATIENT
Start: 2022-06-09 | End: 2023-09-13

## 2022-06-09 NOTE — PROGRESS NOTES
Subjective:   Danielle Barcenas is a 41 y.o. female here today for concern for UTI, back pain, chronic issues below    Urinary frequency  She reports that about a week and a half ago she started having increased urinary frequency and a burning sensation after urination.  For the last 4 days (after onset of her menses) symptoms have improved however she is still feeling like it is more difficult to urinate than normal.  She also feels like she has to return to the bathroom about 10 minutes later to completely empty her bladder.  She denies hematuria.  She has chronic pelvic pain but this has not changed from baseline.  She denies fevers, chills, flank pain.      Chronic right-sided low back pain without sciatica  She originally injured her back in January after she fell twice.  Initially she was having severe pain with radiation down the right leg.  She states that the radiation down the leg has subsided but she continues to have a chronic daily pinching pain over the right lower back.  She denies numbness, weakness, tingling.  She usually takes 600 mg of ibuprofen every morning to help with this pain and she reports improvement.  Pain usually gets worse with standing and lifting.  She did have an x-ray done at the time of injury which was unremarkable.     Seasonal allergies  She continues on Flonase regularly which controls her symptoms well.  She is requesting refill today.    Nicotine dependence due to vaping non-tobacco product  She still desires to quit using nicotine products.  She is vaping currently.  States she was unable to get her nicotine patches due to insurance coverage issues but this was when she was on a different insurance plan.    Moderate mixed hyperlipidemia not requiring statin therapy  Last lipid panel showed moderately elevated total cholesterol and LDL.  States that she has lost 13 pounds since finding out about these labs and has really been working on her diet and physical activity.        Current medicines (including changes today)  Current Outpatient Medications   Medication Sig Dispense Refill   • fluticasone (FLONASE) 50 MCG/ACT nasal spray Administer 1 Spray into affected nostril(S) every day. 16 g 5   • nicotine (NICODERM) 14 MG/24HR PATCH 24 HR Place 1 Patch on the skin every 24 hours. 28 Patch 0   • nicotine (NICODERM) 21 MG/24HR PATCH 24 HR Place 1 Patch on the skin every 24 hours. 28 Patch 0   • nicotine (NICODERM) 7 MG/24HR PATCH 24 HR Place 1 Patch on the skin every 24 hours. 28 Patch 0   • traZODone (DESYREL) 100 MG Tab      • olanzapine (ZYPREXA) 10 MG tablet Take 10 mg by mouth at bedtime.     • ibuprofen (MOTRIN) 600 MG Tab Take 1 tablet by mouth every 8 hours as needed for Moderate Pain or Headache. 30 tablet 5   • topiramate (TOPAMAX) 100 MG Tab Take 1/2 tab daily for 1 week then 1 full tab daily for headache prevention 30 tablet 5   • clonazePAM (KLONOPIN) 1 MG Tab Take 0.5 mg by mouth 2 times a day.     • ferrous sulfate 325 (65 Fe) MG EC tablet Take 1 tablet by mouth every day. 30 tablet 5     No current facility-administered medications for this visit.     She  has a past medical history of Anxiety, Depression, Insomnia, and Migraine.         Objective:     Vitals:    06/09/22 1153   BP: (!) 98/62   Pulse: 60   Resp: 16   Temp: 36.5 °C (97.7 °F)   SpO2: 95%     Body mass index is 24.41 kg/m².   Physical Exam:  Constitutional: Alert, no distress.  Skin: Warm, dry, good turgor, no rashes in visible areas.  Eye: Equal, round and reactive, conjunctiva clear, lids normal.  ENMT: Lips without lesions, good dentition, oropharynx clear, TM's clear bilaterally  Neck: Trachea midline, no masses, no thyromegaly. No cervical or supraclavicular lymphadenopathy  Respiratory: Unlabored respiratory effort, lungs clear to auscultation, no wheezes, no ronchi.  Cardiovascular: Regular rate and rhythm, no murmurs appreciated, no lower extremity edema  Abdomen: Soft, non-tender, no masses, no  hepatosplenomegaly.  Psych: Alert and oriented x3, normal affect and mood.  MSK: Point tenderness over right lumbar paraspinal muscle consistent with trigger point, normal gait  Neuro: No focal sensory or motor deficits    Results and Imaging:   UA in clinic today is completely normal    Assessment and Plan:   The following treatment plan was discussed    1. Urinary frequency  No evidence of UTI.  We discussed that she could have had a UTI previously but cleared on her own.  We will continue to monitor.  - POCT Urinalysis    2. Seasonal allergies  Stable, controlled with current meds which were refilled today  - fluticasone (FLONASE) 50 MCG/ACT nasal spray; Administer 1 Spray into affected nostril(S) every day.  Dispense: 16 g; Refill: 5    3. Chronic right-sided low back pain without sciatica  Pain is most consistent with myofascial pain.  There are no red flag symptoms to prompt reimaging.  Discussed options for treatment including trigger point injection, physical therapy, at home exercises, massage and topical therapies.  She would like to try some physical therapy, declines trigger point injection  - Referral to Physical Therapy    4. Moderate mixed hyperlipidemia not requiring statin therapy  Given significant weight loss, we discussed repeating her lipid panel to assess for improvement  - Lipid Profile; Future    5. Nicotine dependence due to vaping non-tobacco product  - nicotine (NICODERM) 14 MG/24HR PATCH 24 HR; Place 1 Patch on the skin every 24 hours.  Dispense: 28 Patch; Refill: 0  - nicotine (NICODERM) 21 MG/24HR PATCH 24 HR; Place 1 Patch on the skin every 24 hours.  Dispense: 28 Patch; Refill: 0  - nicotine (NICODERM) 7 MG/24HR PATCH 24 HR; Place 1 Patch on the skin every 24 hours.  Dispense: 28 Patch; Refill: 0        Followup: Return if symptoms worsen or fail to improve.

## 2022-06-09 NOTE — ASSESSMENT & PLAN NOTE
Last lipid panel showed moderately elevated total cholesterol and LDL.  States that she has lost 13 pounds since finding out about these labs and has really been working on her diet and physical activity.

## 2022-06-09 NOTE — ASSESSMENT & PLAN NOTE
She reports that about a week and a half ago she started having increased urinary frequency and a burning sensation after urination.  For the last 4 days symptoms have improved however she is still feeling like it is more difficult to urinate than normal.  She also feels like she has to return to the bathroom about 10 minutes later to completely empty her bladder.  She denies hematuria.  She has chronic pelvic pain but this has not changed from baseline.  She denies fevers, chills, flank pain.

## 2022-06-09 NOTE — ASSESSMENT & PLAN NOTE
She continues on Flonase regularly which controls her symptoms well.  She is requesting refill today.

## 2022-06-09 NOTE — ASSESSMENT & PLAN NOTE
She still desires to quit using nicotine products.  She is vaping currently.  States she was unable to get her nicotine patches due to insurance coverage issues but this was when she was on a different insurance plan.

## 2022-08-31 ENCOUNTER — OFFICE VISIT (OUTPATIENT)
Dept: MEDICAL GROUP | Facility: MEDICAL CENTER | Age: 42
End: 2022-08-31
Attending: INTERNAL MEDICINE
Payer: MEDICAID

## 2022-08-31 VITALS
RESPIRATION RATE: 16 BRPM | SYSTOLIC BLOOD PRESSURE: 100 MMHG | TEMPERATURE: 97.3 F | BODY MASS INDEX: 21.79 KG/M2 | HEIGHT: 60 IN | HEART RATE: 78 BPM | WEIGHT: 111 LBS | DIASTOLIC BLOOD PRESSURE: 60 MMHG | OXYGEN SATURATION: 95 %

## 2022-08-31 DIAGNOSIS — N64.4 BREAST PAIN: ICD-10-CM
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
## 2022-08-31 DIAGNOSIS — E78.2 MODERATE MIXED HYPERLIPIDEMIA NOT REQUIRING STATIN THERAPY: ICD-10-CM

## 2022-08-31 DIAGNOSIS — R22.32 AXILLARY LUMP, LEFT: ICD-10-CM

## 2022-08-31 DIAGNOSIS — F33.1 MODERATE EPISODE OF RECURRENT MAJOR DEPRESSIVE DISORDER (HCC): ICD-10-CM

## 2022-08-31 PROCEDURE — 99213 OFFICE O/P EST LOW 20 MIN: CPT | Performed by: INTERNAL MEDICINE

## 2022-08-31 PROCEDURE — 99214 OFFICE O/P EST MOD 30 MIN: CPT | Performed by: INTERNAL MEDICINE

## 2022-08-31 RX ORDER — LUMATEPERONE 42 MG/1
CAPSULE ORAL
COMMUNITY
Start: 2022-08-19 | End: 2022-11-04 | Stop reason: SDUPTHER
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
## 2022-09-01 PROBLEM — N64.4 BREAST PAIN: Status: ACTIVE | Noted: 2022-09-01

## 2022-09-01 PROBLEM — R22.32 AXILLARY LUMP, LEFT: Status: ACTIVE | Noted: 2022-09-01
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
## 2022-09-01 NOTE — ASSESSMENT & PLAN NOTE
Continues to report bilateral breast pain that is off and on.  She recently found a lump in the left armpit.  She states initially it was not very tender but since she has been manipulating it and pushing on it it has gotten a little sore.  She has a history of a benign mass removed from her breast and she was post to follow-up with mammography but she has not done that.
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
## 2022-09-01 NOTE — ASSESSMENT & PLAN NOTE
She reports increased stress lately and is going through a separation with her partner.  She is requesting referral to a therapist.

## 2022-09-21 ENCOUNTER — APPOINTMENT (OUTPATIENT)
Dept: RADIOLOGY | Facility: MEDICAL CENTER | Age: 42
End: 2022-09-21
Attending: INTERNAL MEDICINE
Payer: MEDICAID

## 2022-10-19 ENCOUNTER — HOSPITAL ENCOUNTER (OUTPATIENT)
Dept: RADIOLOGY | Facility: MEDICAL CENTER | Age: 42
End: 2022-10-19
Attending: INTERNAL MEDICINE
Payer: MEDICAID

## 2022-10-19 DIAGNOSIS — N64.4 BREAST PAIN: ICD-10-CM

## 2022-10-19 DIAGNOSIS — R22.32 AXILLARY LUMP, LEFT: ICD-10-CM

## 2022-10-19 PROCEDURE — 76642 ULTRASOUND BREAST LIMITED: CPT | Mod: RT

## 2022-10-19 PROCEDURE — G0279 TOMOSYNTHESIS, MAMMO: HCPCS

## 2022-11-03 ENCOUNTER — PATIENT MESSAGE (OUTPATIENT)
Dept: MEDICAL GROUP | Facility: MEDICAL CENTER | Age: 42
End: 2022-11-03
Payer: MEDICAID

## 2022-11-03 DIAGNOSIS — F33.1 MODERATE EPISODE OF RECURRENT MAJOR DEPRESSIVE DISORDER (HCC): ICD-10-CM

## 2022-11-03 DIAGNOSIS — F51.01 PRIMARY INSOMNIA: ICD-10-CM

## 2022-11-03 DIAGNOSIS — F41.0 GENERALIZED ANXIETY DISORDER WITH PANIC ATTACKS: ICD-10-CM

## 2022-11-03 DIAGNOSIS — G43.009 MIGRAINE WITHOUT AURA AND WITHOUT STATUS MIGRAINOSUS, NOT INTRACTABLE: ICD-10-CM

## 2022-11-03 DIAGNOSIS — F41.1 GENERALIZED ANXIETY DISORDER WITH PANIC ATTACKS: ICD-10-CM

## 2022-11-04 RX ORDER — LUMATEPERONE 42 MG/1
1 CAPSULE ORAL DAILY
Qty: 30 CAPSULE | Refills: 0 | Status: SHIPPED | OUTPATIENT
Start: 2022-11-04 | End: 2022-12-05 | Stop reason: SDUPTHER

## 2022-11-04 RX ORDER — TRAZODONE HYDROCHLORIDE 100 MG/1
200 TABLET ORAL NIGHTLY
Qty: 60 TABLET | Refills: 0 | Status: SHIPPED | OUTPATIENT
Start: 2022-11-04 | End: 2022-12-05 | Stop reason: SDUPTHER

## 2022-11-04 RX ORDER — TOPIRAMATE 100 MG/1
100 TABLET, FILM COATED ORAL DAILY
Qty: 30 TABLET | Refills: 0 | Status: SHIPPED | OUTPATIENT
Start: 2022-11-04 | End: 2022-12-05 | Stop reason: SDUPTHER

## 2022-12-05 ENCOUNTER — PATIENT MESSAGE (OUTPATIENT)
Dept: MEDICAL GROUP | Facility: MEDICAL CENTER | Age: 42
End: 2022-12-05
Payer: MEDICAID

## 2022-12-05 DIAGNOSIS — G43.009 MIGRAINE WITHOUT AURA AND WITHOUT STATUS MIGRAINOSUS, NOT INTRACTABLE: ICD-10-CM

## 2022-12-05 DIAGNOSIS — F33.1 MODERATE EPISODE OF RECURRENT MAJOR DEPRESSIVE DISORDER (HCC): ICD-10-CM

## 2022-12-05 DIAGNOSIS — F51.01 PRIMARY INSOMNIA: ICD-10-CM

## 2022-12-05 DIAGNOSIS — F41.0 GENERALIZED ANXIETY DISORDER WITH PANIC ATTACKS: ICD-10-CM

## 2022-12-05 DIAGNOSIS — F41.1 GENERALIZED ANXIETY DISORDER WITH PANIC ATTACKS: ICD-10-CM

## 2022-12-07 RX ORDER — TOPIRAMATE 100 MG/1
100 TABLET, FILM COATED ORAL DAILY
Qty: 30 TABLET | Refills: 5 | Status: SHIPPED | OUTPATIENT
Start: 2022-12-07 | End: 2023-07-10

## 2022-12-07 RX ORDER — LUMATEPERONE 42 MG/1
1 CAPSULE ORAL DAILY
Qty: 30 CAPSULE | Refills: 5 | Status: SHIPPED
Start: 2022-12-07 | End: 2023-03-15

## 2022-12-07 RX ORDER — TRAZODONE HYDROCHLORIDE 100 MG/1
200 TABLET ORAL NIGHTLY
Qty: 60 TABLET | Refills: 5 | Status: SHIPPED | OUTPATIENT
Start: 2022-12-07 | End: 2023-07-10

## 2023-02-13 DIAGNOSIS — G43.019 INTRACTABLE MIGRAINE WITHOUT AURA AND WITHOUT STATUS MIGRAINOSUS: ICD-10-CM

## 2023-02-14 RX ORDER — IBUPROFEN 600 MG/1
TABLET ORAL
Qty: 30 TABLET | Refills: 1 | Status: SHIPPED
Start: 2023-02-14 | End: 2023-09-13

## 2023-02-14 NOTE — TELEPHONE ENCOUNTER
Received request via: Pharmacy    Was the patient seen in the last year in this department? Yes    Does the patient have an active prescription (recently filled or refills available) for medication(s) requested? No    Does the patient have senior living Plus and need 100 day supply (blood pressure, diabetes and cholesterol meds only)? Patient does not have SCP   No appt scheduled at this time

## 2023-03-15 ENCOUNTER — HOSPITAL ENCOUNTER (OUTPATIENT)
Facility: MEDICAL CENTER | Age: 43
End: 2023-03-15
Attending: INTERNAL MEDICINE
Payer: MEDICAID

## 2023-03-15 ENCOUNTER — OFFICE VISIT (OUTPATIENT)
Dept: MEDICAL GROUP | Facility: MEDICAL CENTER | Age: 43
End: 2023-03-15
Attending: INTERNAL MEDICINE
Payer: MEDICAID

## 2023-03-15 VITALS
SYSTOLIC BLOOD PRESSURE: 98 MMHG | HEART RATE: 84 BPM | HEIGHT: 60 IN | BODY MASS INDEX: 20.03 KG/M2 | DIASTOLIC BLOOD PRESSURE: 60 MMHG | WEIGHT: 102 LBS | OXYGEN SATURATION: 97 % | TEMPERATURE: 97.3 F | RESPIRATION RATE: 16 BRPM

## 2023-03-15 DIAGNOSIS — Z12.4 SCREENING FOR MALIGNANT NEOPLASM OF CERVIX: ICD-10-CM

## 2023-03-15 DIAGNOSIS — Z13.1 SCREENING FOR DIABETES MELLITUS: ICD-10-CM

## 2023-03-15 DIAGNOSIS — E78.2 MODERATE MIXED HYPERLIPIDEMIA NOT REQUIRING STATIN THERAPY: ICD-10-CM

## 2023-03-15 DIAGNOSIS — N92.0 MENORRHAGIA WITH REGULAR CYCLE: ICD-10-CM

## 2023-03-15 DIAGNOSIS — Z11.3 SCREEN FOR STD (SEXUALLY TRANSMITTED DISEASE): ICD-10-CM

## 2023-03-15 PROBLEM — R35.0 URINARY FREQUENCY: Status: RESOLVED | Noted: 2022-06-09 | Resolved: 2023-03-15

## 2023-03-15 PROCEDURE — 87491 CHLMYD TRACH DNA AMP PROBE: CPT

## 2023-03-15 PROCEDURE — 87660 TRICHOMONAS VAGIN DIR PROBE: CPT

## 2023-03-15 PROCEDURE — 99214 OFFICE O/P EST MOD 30 MIN: CPT | Mod: 25 | Performed by: INTERNAL MEDICINE

## 2023-03-15 PROCEDURE — 87480 CANDIDA DNA DIR PROBE: CPT

## 2023-03-15 PROCEDURE — 87591 N.GONORRHOEAE DNA AMP PROB: CPT

## 2023-03-15 PROCEDURE — 87624 HPV HI-RISK TYP POOLED RSLT: CPT

## 2023-03-15 PROCEDURE — 99213 OFFICE O/P EST LOW 20 MIN: CPT | Performed by: INTERNAL MEDICINE

## 2023-03-15 PROCEDURE — 88175 CYTOPATH C/V AUTO FLUID REDO: CPT

## 2023-03-15 PROCEDURE — 87510 GARDNER VAG DNA DIR PROBE: CPT

## 2023-03-15 NOTE — ASSESSMENT & PLAN NOTE
Continues to report heavy menstrual cycles and typically has 2 cycles a month.  Has been anemic in the past.  Reports stomachaches and headaches in the mornings but denies dizziness, lightheadedness, shortness of breath.  Takes a multivitamin which has supplemental iron in it on a daily basis.

## 2023-03-15 NOTE — ASSESSMENT & PLAN NOTE
Has made some significant dietary changes since her last labs were done and has also lost some weight.

## 2023-03-15 NOTE — ASSESSMENT & PLAN NOTE
She presents today for Pap.  Last was done 3 years ago and normal.  Recently is back with a previous sexual partner after 1 year and is requesting STI testing.  Denies vaginal discharge, pelvic pain, dysuria.

## 2023-03-15 NOTE — PROGRESS NOTES
Subjective:   Danielle Barcenas is a 42 y.o. female here today for PAP, labs    Screening for malignant neoplasm of cervix  She presents today for Pap.  Last was done 3 years ago and normal.  Recently is back with a previous sexual partner after 1 year and is requesting STI testing.  Denies vaginal discharge, pelvic pain, dysuria.    Menorrhagia with regular cycle  Continues to report heavy menstrual cycles and typically has 2 cycles a month.  Has been anemic in the past.  Reports stomachaches and headaches in the mornings but denies dizziness, lightheadedness, shortness of breath.  Takes a multivitamin which has supplemental iron in it on a daily basis.    Moderate mixed hyperlipidemia not requiring statin therapy  Has made some significant dietary changes since her last labs were done and has also lost some weight.       Current medicines (including changes today)  Current Outpatient Medications   Medication Sig Dispense Refill    ibuprofen (MOTRIN) 600 MG Tab TAKE ONE TABLET BY MOUTH EVERY 8 HOURS AS NEEDED FOR MODERATE PAIN OR FOR HEADACHE 30 Tablet 1    traZODone (DESYREL) 100 MG Tab Take 2 Tablets by mouth every evening. 60 Tablet 5    topiramate (TOPAMAX) 100 MG Tab Take 1 Tablet by mouth every day. 30 Tablet 5    fluticasone (FLONASE) 50 MCG/ACT nasal spray Administer 1 Spray into affected nostril(S) every day. 16 g 5    ferrous sulfate 325 (65 Fe) MG EC tablet Take 1 tablet by mouth every day. 30 tablet 5    clonazePAM (KLONOPIN) 1 MG Tab Take 0.5 mg by mouth 2 times a day.       No current facility-administered medications for this visit.     She  has a past medical history of Anxiety, Depression, Insomnia, and Migraine.         Objective:     Vitals:    03/15/23 1100   BP: 98/60   Pulse: 84   Resp: 16   Temp: 36.3 °C (97.3 °F)   SpO2: 97%     Body mass index is 19.92 kg/m².   Physical Exam:  Constitutional: Alert, no distress.  Skin: Warm, dry, good turgor, no rashes in visible areas.  Eye: Equal,  round and reactive, conjunctiva clear, lids normal.  Psych: Alert and oriented x3, normal affect and mood.  : external genitalia normal, cervix without discharge or lesions, no cervical motion tenderness      Assessment and Plan:   The following treatment plan was discussed    1. Screening for malignant neoplasm of cervix  - VAGINAL PATHOGENS DNA PANEL; Future  - THINPREP PAP W/HPV AND CTNG; Future    2. Screening for diabetes mellitus  - HEMOGLOBIN A1C; Future    3. Menorrhagia with regular cycle  We will obtain updated labs to make sure she has not developed anemia.  She will continue with her multivitamin  - CBC WITHOUT DIFFERENTIAL; Future  - IRON/TOTAL IRON BIND; Future  - FERRITIN; Future    4. Screen for STD (sexually transmitted disease)  - VAGINAL PATHOGENS DNA PANEL; Future    5. Moderate mixed hyperlipidemia not requiring statin therapy  -Follow-up with lipid panel previously ordered        Followup: Return if symptoms worsen or fail to improve.            No

## 2023-03-16 DIAGNOSIS — Z11.3 SCREEN FOR STD (SEXUALLY TRANSMITTED DISEASE): ICD-10-CM

## 2023-03-16 DIAGNOSIS — Z12.4 SCREENING FOR MALIGNANT NEOPLASM OF CERVIX: ICD-10-CM

## 2023-03-16 LAB
C TRACH DNA GENITAL QL NAA+PROBE: NEGATIVE
CANDIDA DNA VAG QL PROBE+SIG AMP: NEGATIVE
CYTOLOGY REG CYTOL: NORMAL
G VAGINALIS DNA VAG QL PROBE+SIG AMP: POSITIVE
HPV HR 12 DNA CVX QL NAA+PROBE: NEGATIVE
HPV16 DNA SPEC QL NAA+PROBE: NEGATIVE
HPV18 DNA SPEC QL NAA+PROBE: NEGATIVE
N GONORRHOEA DNA GENITAL QL NAA+PROBE: NEGATIVE
SPECIMEN SOURCE: NORMAL
SPECIMEN SOURCE: NORMAL
T VAGINALIS DNA VAG QL PROBE+SIG AMP: NEGATIVE

## 2023-04-06 ENCOUNTER — PHARMACY VISIT (OUTPATIENT)
Dept: PHARMACY | Facility: MEDICAL CENTER | Age: 43
End: 2023-04-06
Payer: COMMERCIAL

## 2023-04-06 PROCEDURE — RXMED WILLOW AMBULATORY MEDICATION CHARGE: Performed by: INTERNAL MEDICINE

## 2023-04-07 ENCOUNTER — HOSPITAL ENCOUNTER (OUTPATIENT)
Dept: LAB | Facility: MEDICAL CENTER | Age: 43
End: 2023-04-07
Attending: INTERNAL MEDICINE
Payer: MEDICAID

## 2023-04-07 DIAGNOSIS — Z13.1 SCREENING FOR DIABETES MELLITUS: ICD-10-CM

## 2023-04-07 DIAGNOSIS — N92.0 MENORRHAGIA WITH REGULAR CYCLE: ICD-10-CM

## 2023-04-07 DIAGNOSIS — E78.2 MODERATE MIXED HYPERLIPIDEMIA NOT REQUIRING STATIN THERAPY: ICD-10-CM

## 2023-04-07 LAB
ERYTHROCYTE [DISTWIDTH] IN BLOOD BY AUTOMATED COUNT: 45.8 FL (ref 35.9–50)
EST. AVERAGE GLUCOSE BLD GHB EST-MCNC: 97 MG/DL
HBA1C MFR BLD: 5 % (ref 4–5.6)
HCT VFR BLD AUTO: 40 % (ref 37–47)
HGB BLD-MCNC: 12.4 G/DL (ref 12–16)
MCH RBC QN AUTO: 28.4 PG (ref 27–33)
MCHC RBC AUTO-ENTMCNC: 31 G/DL (ref 33.6–35)
MCV RBC AUTO: 91.5 FL (ref 81.4–97.8)
PLATELET # BLD AUTO: 274 K/UL (ref 164–446)
PMV BLD AUTO: 10.5 FL (ref 9–12.9)
RBC # BLD AUTO: 4.37 M/UL (ref 4.2–5.4)
WBC # BLD AUTO: 5 K/UL (ref 4.8–10.8)

## 2023-04-07 PROCEDURE — 80061 LIPID PANEL: CPT

## 2023-04-07 PROCEDURE — 36415 COLL VENOUS BLD VENIPUNCTURE: CPT

## 2023-04-07 PROCEDURE — 82728 ASSAY OF FERRITIN: CPT

## 2023-04-07 PROCEDURE — 85027 COMPLETE CBC AUTOMATED: CPT

## 2023-04-07 PROCEDURE — 83550 IRON BINDING TEST: CPT

## 2023-04-07 PROCEDURE — 83036 HEMOGLOBIN GLYCOSYLATED A1C: CPT

## 2023-04-07 PROCEDURE — 83540 ASSAY OF IRON: CPT

## 2023-04-08 LAB
CHOLEST SERPL-MCNC: 178 MG/DL (ref 100–199)
FASTING STATUS PATIENT QL REPORTED: NORMAL
FERRITIN SERPL-MCNC: 11.4 NG/ML (ref 10–291)
HDLC SERPL-MCNC: 71 MG/DL
IRON SATN MFR SERPL: 7 % (ref 15–55)
IRON SERPL-MCNC: 22 UG/DL (ref 40–170)
LDLC SERPL CALC-MCNC: 92 MG/DL
TIBC SERPL-MCNC: 325 UG/DL (ref 250–450)
TRIGL SERPL-MCNC: 74 MG/DL (ref 0–149)
UIBC SERPL-MCNC: 303 UG/DL (ref 110–370)

## 2023-04-12 DIAGNOSIS — N92.0 MENORRHAGIA WITH REGULAR CYCLE: ICD-10-CM

## 2023-04-12 DIAGNOSIS — E61.1 IRON DEFICIENCY: ICD-10-CM

## 2023-04-12 RX ORDER — LANOLIN ALCOHOL/MO/W.PET/CERES
325 CREAM (GRAM) TOPICAL DAILY
Qty: 30 TABLET | Refills: 5 | Status: SHIPPED | OUTPATIENT
Start: 2023-04-12

## 2023-08-10 ENCOUNTER — HOSPITAL ENCOUNTER (OUTPATIENT)
Dept: RADIOLOGY | Facility: MEDICAL CENTER | Age: 43
End: 2023-08-10
Payer: MEDICAID

## 2023-08-10 ENCOUNTER — OFFICE VISIT (OUTPATIENT)
Dept: URGENT CARE | Facility: CLINIC | Age: 43
End: 2023-08-10
Payer: MEDICAID

## 2023-08-10 VITALS
SYSTOLIC BLOOD PRESSURE: 110 MMHG | WEIGHT: 95.9 LBS | BODY MASS INDEX: 18.83 KG/M2 | RESPIRATION RATE: 18 BRPM | TEMPERATURE: 98.6 F | DIASTOLIC BLOOD PRESSURE: 58 MMHG | OXYGEN SATURATION: 98 % | HEIGHT: 60 IN | HEART RATE: 58 BPM

## 2023-08-10 DIAGNOSIS — Z87.19 HISTORY OF CONSTIPATION: ICD-10-CM

## 2023-08-10 DIAGNOSIS — R10.84 GENERALIZED ABDOMINAL PAIN: ICD-10-CM

## 2023-08-10 DIAGNOSIS — G89.29 CHRONIC ABDOMINAL PAIN: ICD-10-CM

## 2023-08-10 DIAGNOSIS — R10.9 CHRONIC ABDOMINAL PAIN: ICD-10-CM

## 2023-08-10 DIAGNOSIS — R12 HEARTBURN: ICD-10-CM

## 2023-08-10 PROCEDURE — 3074F SYST BP LT 130 MM HG: CPT

## 2023-08-10 PROCEDURE — 99213 OFFICE O/P EST LOW 20 MIN: CPT

## 2023-08-10 PROCEDURE — 74176 CT ABD & PELVIS W/O CONTRAST: CPT

## 2023-08-10 PROCEDURE — 3078F DIAST BP <80 MM HG: CPT

## 2023-08-10 RX ORDER — OMEPRAZOLE 20 MG/1
20 CAPSULE, DELAYED RELEASE ORAL DAILY
Qty: 30 CAPSULE | Refills: 0 | Status: SHIPPED | OUTPATIENT
Start: 2023-08-10
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
## 2023-09-13 ENCOUNTER — OFFICE VISIT (OUTPATIENT)
Dept: URGENT CARE | Facility: CLINIC | Age: 43
End: 2023-09-13
Payer: MEDICAID

## 2023-09-13 VITALS
HEIGHT: 60 IN | HEART RATE: 63 BPM | WEIGHT: 90 LBS | TEMPERATURE: 97.2 F | OXYGEN SATURATION: 99 % | SYSTOLIC BLOOD PRESSURE: 98 MMHG | BODY MASS INDEX: 17.67 KG/M2 | DIASTOLIC BLOOD PRESSURE: 58 MMHG | RESPIRATION RATE: 16 BRPM

## 2023-09-13 DIAGNOSIS — B96.89 BACTERIAL SINUSITIS: ICD-10-CM

## 2023-09-13 DIAGNOSIS — Z72.0 TOBACCO USER: ICD-10-CM

## 2023-09-13 DIAGNOSIS — J32.9 BACTERIAL SINUSITIS: ICD-10-CM

## 2023-09-13 PROCEDURE — 3074F SYST BP LT 130 MM HG: CPT | Performed by: FAMILY MEDICINE

## 2023-09-13 PROCEDURE — 99213 OFFICE O/P EST LOW 20 MIN: CPT | Mod: 25 | Performed by: FAMILY MEDICINE

## 2023-09-13 PROCEDURE — 99406 BEHAV CHNG SMOKING 3-10 MIN: CPT | Performed by: FAMILY MEDICINE

## 2023-09-13 PROCEDURE — 3078F DIAST BP <80 MM HG: CPT | Performed by: FAMILY MEDICINE

## 2023-09-13 RX ORDER — AMOXICILLIN AND CLAVULANATE POTASSIUM 875; 125 MG/1; MG/1
1 TABLET, FILM COATED ORAL 2 TIMES DAILY
Qty: 10 TABLET | Refills: 0 | Status: SHIPPED | OUTPATIENT
Start: 2023-09-13 | End: 2023-09-18

## 2023-09-13 NOTE — LETTER
September 13, 2023    To Whom It May Concern:         This is confirmation that Danielle Pulliam Narinder attended her scheduled appointment with Leandra Lopez M.D. on 9/13/23. She may return to work on Friday without any restrictions.          If you have any questions please do not hesitate to call me at the phone number listed below.    Sincerely,          Leandra Lopez M.D.  [phone]

## 2023-09-13 NOTE — PROGRESS NOTES
Subjective:      43 y.o. female presents to urgent care for cold symptoms that started 1 week ago.  She is experiencing headache, increased sinus pressure, fever, and cough.  No diarrhea.  She does vape tobacco products daily.  She has a history of childhood asthma, but has not needed medication for several years.  She is vaccinated against COVID.  Her children are currently sick with similar symptoms.    She denies any other questions or concerns at this time.    Current problem list, medication, and past medical/surgical history were reviewed in Epic.    ROS  See HPI     Objective:      BP 98/58   Pulse 63   Temp 36.2 °C (97.2 °F)   Resp 16   Ht 1.524 m (5')   Wt 40.8 kg (90 lb)   SpO2 99%   BMI 17.58 kg/m²     Physical Exam  Constitutional:       General: She is not in acute distress.     Appearance: She is not diaphoretic.   HENT:      Right Ear: Tympanic membrane, ear canal and external ear normal.      Left Ear: Tympanic membrane, ear canal and external ear normal.      Nose:      Right Sinus: Maxillary sinus tenderness and frontal sinus tenderness present.      Left Sinus: Maxillary sinus tenderness and frontal sinus tenderness present.      Mouth/Throat:      Pharynx: No posterior oropharyngeal erythema.      Tonsils: No tonsillar exudate. 1+ on the right. 1+ on the left.   Cardiovascular:      Rate and Rhythm: Normal rate and regular rhythm.      Heart sounds: Normal heart sounds.   Pulmonary:      Effort: Pulmonary effort is normal. No respiratory distress.      Breath sounds: Normal breath sounds.   Neurological:      Mental Status: She is alert.   Psychiatric:         Mood and Affect: Affect normal.         Judgment: Judgment normal.       Assessment/Plan:     1. Bacterial sinusitis  Criteria for bacterial sinusitis has been met.  Prescription for Augmentin has been sent.  Tylenol, ibuprofen, rest, and hydration as needed for symptomatic relief.  - amoxicillin-clavulanate (AUGMENTIN) 875-125 MG  Tab; Take 1 Tablet by mouth 2 times a day for 5 days.  Dispense: 10 Tablet; Refill: 0    2. Tobacco user  4 minutes was spent in educating patient of health risks associated with tobacco, nicotine, and vaping. Verbalized understanding. She has been successful in quitting in the past.  The longest she has quit for was 3 months previously.  She is interested in quitting now.  I have given her the resource 1 800 quit now.      Instructed to return to Urgent Care or nearest Emergency Department if symptoms fail to improve, for any change in condition, further concerns, or new concerning symptoms. Patient states understanding of the plan of care and discharge instructions.    Leandra Lopez M.D.

## 2023-09-26 ENCOUNTER — OFFICE VISIT (OUTPATIENT)
Dept: URGENT CARE | Facility: CLINIC | Age: 43
End: 2023-09-26
Payer: MEDICAID

## 2023-09-26 VITALS
OXYGEN SATURATION: 100 % | RESPIRATION RATE: 14 BRPM | HEART RATE: 55 BPM | SYSTOLIC BLOOD PRESSURE: 122 MMHG | DIASTOLIC BLOOD PRESSURE: 70 MMHG | BODY MASS INDEX: 18.65 KG/M2 | WEIGHT: 95 LBS | HEIGHT: 60 IN | TEMPERATURE: 98.4 F

## 2023-09-26 DIAGNOSIS — S16.1XXA ACUTE STRAIN OF NECK MUSCLE, INITIAL ENCOUNTER: ICD-10-CM

## 2023-09-26 PROCEDURE — 3078F DIAST BP <80 MM HG: CPT | Performed by: FAMILY MEDICINE

## 2023-09-26 PROCEDURE — 3074F SYST BP LT 130 MM HG: CPT | Performed by: FAMILY MEDICINE

## 2023-09-26 PROCEDURE — 99213 OFFICE O/P EST LOW 20 MIN: CPT | Performed by: FAMILY MEDICINE

## 2023-09-26 RX ORDER — CYCLOBENZAPRINE HCL 5 MG
5-10 TABLET ORAL 3 TIMES DAILY PRN
Qty: 30 TABLET | Refills: 0 | Status: SHIPPED | OUTPATIENT
Start: 2023-09-26 | End: 2024-01-12 | Stop reason: SDUPTHER
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
## 2023-10-10 ENCOUNTER — OFFICE VISIT (OUTPATIENT)
Dept: MEDICAL GROUP | Facility: MEDICAL CENTER | Age: 43
End: 2023-10-10
Attending: INTERNAL MEDICINE
Payer: MEDICAID

## 2023-10-10 ENCOUNTER — PATIENT MESSAGE (OUTPATIENT)
Dept: MEDICAL GROUP | Facility: MEDICAL CENTER | Age: 43
End: 2023-10-10

## 2023-10-10 ENCOUNTER — PHARMACY VISIT (OUTPATIENT)
Dept: PHARMACY | Facility: MEDICAL CENTER | Age: 43
End: 2023-10-10
Payer: COMMERCIAL

## 2023-10-10 VITALS
HEIGHT: 60 IN | RESPIRATION RATE: 16 BRPM | DIASTOLIC BLOOD PRESSURE: 60 MMHG | SYSTOLIC BLOOD PRESSURE: 100 MMHG | WEIGHT: 96.6 LBS | HEART RATE: 88 BPM | BODY MASS INDEX: 18.97 KG/M2 | TEMPERATURE: 97.9 F | OXYGEN SATURATION: 98 %

## 2023-10-10 DIAGNOSIS — R41.3 MEMORY PROBLEM: ICD-10-CM

## 2023-10-10 DIAGNOSIS — J30.2 SEASONAL ALLERGIES: ICD-10-CM

## 2023-10-10 DIAGNOSIS — F41.1 GENERALIZED ANXIETY DISORDER WITH PANIC ATTACKS: ICD-10-CM

## 2023-10-10 DIAGNOSIS — F41.0 GENERALIZED ANXIETY DISORDER WITH PANIC ATTACKS: ICD-10-CM

## 2023-10-10 DIAGNOSIS — F51.01 PRIMARY INSOMNIA: ICD-10-CM

## 2023-10-10 DIAGNOSIS — F17.200 NICOTINE DEPENDENCE DUE TO VAPING NON-TOBACCO PRODUCT: ICD-10-CM
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
## 2023-10-10 PROBLEM — Z12.4 SCREENING FOR MALIGNANT NEOPLASM OF CERVIX: Status: RESOLVED | Noted: 2023-03-15 | Resolved: 2023-10-10

## 2023-10-10 PROBLEM — N64.4 BREAST PAIN: Status: RESOLVED | Noted: 2022-09-01 | Resolved: 2023-10-10

## 2023-10-10 PROBLEM — R22.32 AXILLARY LUMP, LEFT: Status: RESOLVED | Noted: 2022-09-01 | Resolved: 2023-10-10

## 2023-10-10 PROCEDURE — 3078F DIAST BP <80 MM HG: CPT | Performed by: INTERNAL MEDICINE

## 2023-10-10 PROCEDURE — 99213 OFFICE O/P EST LOW 20 MIN: CPT | Performed by: INTERNAL MEDICINE

## 2023-10-10 PROCEDURE — 3074F SYST BP LT 130 MM HG: CPT | Performed by: INTERNAL MEDICINE

## 2023-10-10 PROCEDURE — RXMED WILLOW AMBULATORY MEDICATION CHARGE: Performed by: INTERNAL MEDICINE

## 2023-10-10 PROCEDURE — 99214 OFFICE O/P EST MOD 30 MIN: CPT | Performed by: INTERNAL MEDICINE

## 2023-10-10 RX ORDER — NICOTINE 21 MG/24HR
1 PATCH, TRANSDERMAL 24 HOURS TRANSDERMAL EVERY 24 HOURS
Qty: 30 PATCH | Refills: 0 | Status: SHIPPED | OUTPATIENT
Start: 2023-10-10 | End: 2023-11-30

## 2023-10-10 RX ORDER — TRAZODONE HYDROCHLORIDE 50 MG/1
TABLET ORAL
Qty: 21 TABLET | Refills: 0 | Status: SHIPPED
Start: 2023-10-10 | End: 2023-11-30 | Stop reason: DRUGHIGH

## 2023-10-10 RX ORDER — TRAZODONE HYDROCHLORIDE 50 MG/1
TABLET ORAL
Qty: 21 TABLET | Refills: 0 | Status: SHIPPED | OUTPATIENT
Start: 2023-10-10 | End: 2023-10-10 | Stop reason: SDUPTHER

## 2023-10-10 RX ORDER — IBUPROFEN 600 MG/1
TABLET ORAL
COMMUNITY
Start: 2023-09-13 | End: 2023-11-10

## 2023-10-10 RX ORDER — FLUCONAZOLE 150 MG/1
TABLET ORAL
COMMUNITY
Start: 2023-09-15 | End: 2023-10-10

## 2023-10-10 RX ORDER — FLUOXETINE 10 MG/1
10 CAPSULE ORAL DAILY
Qty: 30 CAPSULE | Refills: 1 | Status: SHIPPED
Start: 2023-10-10 | End: 2023-11-30

## 2023-10-10 RX ORDER — NITROFURANTOIN 25; 75 MG/1; MG/1
CAPSULE ORAL
COMMUNITY
Start: 2023-09-29 | End: 2023-10-10

## 2023-10-10 NOTE — PROGRESS NOTES
Subjective:   Danielle Barcenas is a 43 y.o. female here today for medication discussion, nicotine patches, memory    Nicotine dependence due to vaping non-tobacco product  She has started vaping again.  She states that it is a little less than last time however she would like to stop completely.  Nicotine patches helped in the past and she would like to restart.    Generalized anxiety disorder with panic attacks  She reports a lot of stress lately.  She is dealing with some significant family issues.  She wants to avoid any type of controlled substance but she would like to try restarting an SSRI.  She has had benefit with both Prozac and Paxil in the past however these medications were tried when she was much younger.  She has not been on anything like this since.    Primary insomnia  She continues to report difficulty with sleep however feels like the trazodone is not helpful.  She dropped from 200 mg to 100 mg and felt significant withdrawal symptoms for about 2 and half weeks.  She would like to taper off of it.    Memory problem  She reports more difficulty with memory.  States that she will be having a conversation with her son and forget what they were talking about.  Also states that she has trouble placing events and people.  Overall feels like her thinking is slowed.  She is taking Topamax and trazodone and wondering whether these could be side effects.  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  She denies missing appointments or forgetting scheduled events.       Current medicines (including changes today)  Current Outpatient Medications   Medication Sig Dispense Refill    ibuprofen (MOTRIN) 600 MG Tab       nicotine (NICODERM) 7 MG/24HR PATCH 24 HR Place 1 Patch on the skin every 24 hours. 30 Patch 0    nicotine (NICODERM) 14 MG/24HR PATCH 24 HR Place 1 Patch on the skin every 24 hours. 30 Patch 0    nicotine  (NICODERM) 21 MG/24HR PATCH 24 HR Place 1 Patch on the skin every 24 hours. 30 Patch 0    FLUoxetine (PROZAC) 10 MG Cap Take 1 Capsule by mouth every day. 30 Capsule 1    traZODone (DESYREL) 50 MG Tab Take 75 mg x 1 week then 50 mg x 1 week then 25 mg x 1 week then stop 21 Tablet 0    cyclobenzaprine (FLEXERIL) 5 mg tablet Take 1-2 Tablets by mouth 3 times a day as needed for Moderate Pain or Muscle Spasms. 30 Tablet 0    diclofenac sodium (VOLTAREN) 1 % Gel Apply 4 g topically in the morning, at noon, and at bedtime. 50 g 0    omeprazole (PRILOSEC) 20 MG delayed-release capsule Take 1 Capsule by mouth every day. 30 Capsule 0    topiramate (TOPAMAX) 100 MG Tab TAKE ONE TABLET BY MOUTH EVERY DAY 90 Tablet 1    ferrous sulfate 325 (65 Fe) MG EC tablet Take 1 Tablet by mouth every day. 30 Tablet 5     No current facility-administered medications for this visit.     She  has a past medical history of Anxiety, Depression, Insomnia, and Migraine.         Objective:     Vitals:    10/10/23 0734   BP: 100/60   Pulse: 88   Resp: 16   Temp: 36.6 °C (97.9 °F)   SpO2: 98%     Body mass index is 18.87 kg/m².   Physical Exam:  Constitutional: Alert, no distress.  Skin: Warm, dry, good turgor, no rashes in visible areas.  Eye: Equal, round and reactive, conjunctiva clear, lids normal.  Psych: Alert and oriented x3, normal affect and mood.      Assessment and Plan:   The following treatment plan was discussed    1. Primary insomnia  We discussed weaning off of trazodone slowly given withdrawal symptoms when she decreased more quickly on her own.  See taper below.  We will follow-up in 4 weeks.  - traZODone (DESYREL) 50 MG Tab; Take 75 mg x 1 week then 50 mg x 1 week then 25 mg x 1 week then stop  Dispense: 21 Tablet; Refill: 0    2. Nicotine dependence due to vaping non-tobacco product  We will restart nicotine patches  - nicotine (NICODERM) 7 MG/24HR PATCH 24 HR; Place 1 Patch on the skin every 24 hours.  Dispense: 30 Patch;  Refill: 0  - nicotine (NICODERM) 14 MG/24HR PATCH 24 HR; Place 1 Patch on the skin every 24 hours.  Dispense: 30 Patch; Refill: 0  - nicotine (NICODERM) 21 MG/24HR PATCH 24 HR; Place 1 Patch on the skin every 24 hours.  Dispense: 30 Patch; Refill: 0    3. Generalized anxiety disorder with panic attacks  Discussed restarting low-dose Prozac.  Will avoid Paxil for now given potential for associated weight gain.  She prefers to be on the least amount of medication but we discussed that she may need to uptitrate to 20 or 40 mg based on her response.  Encouraged her to start working with a therapist although this will be difficult with her time constraints  - FLUoxetine (PROZAC) 10 MG Cap; Take 1 Capsule by mouth every day.  Dispense: 30 Capsule; Refill: 1  -Follow-up in 4 weeks    4. Memory problem  Likely multifactorial.  No red flag symptoms to suggest early dementia however could be side effect from medication.  We discussed brain fog/brain slowing with Topamax.  She would like to taper off the trazodone first to see if this improves her thinking and if not consider the Topamax.  We also discussed importance of good sleep quality which is difficult for her now.  She has no time for self-care and feels very stressed which could also contribute.        Followup: Return in about 4 weeks (around 11/7/2023) for anxiety.

## 2023-10-10 NOTE — ASSESSMENT & PLAN NOTE
She has started vaping again.  She states that it is a little less than last time however she would like to stop completely.  Nicotine patches helped in the past and she would like to restart.

## 2023-10-10 NOTE — ASSESSMENT & PLAN NOTE
She reports more difficulty with memory.  States that she will be having a conversation with her son and forget what they were talking about.  Also states that she has trouble placing events and people.  Overall feels like her thinking is slowed.  She is taking Topamax and trazodone and wondering whether these could be side effects.  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  She denies missing appointments or forgetting scheduled events.

## 2023-10-10 NOTE — ASSESSMENT & PLAN NOTE
She reports a lot of stress lately.  She is dealing with some significant family issues.  She wants to avoid any type of controlled substance but she would like to try restarting an SSRI.  She has had benefit with both Prozac and Paxil in the past however these medications were tried when she was much younger.  She has not been on anything like this since.

## 2023-10-10 NOTE — ASSESSMENT & PLAN NOTE
She continues to report difficulty with sleep however feels like the trazodone is not helpful.  She dropped from 200 mg to 100 mg and felt significant withdrawal symptoms for about 2 and half weeks.  She would like to taper off of it.

## 2023-10-11 RX ORDER — FLUTICASONE PROPIONATE 50 MCG
1 SPRAY, SUSPENSION (ML) NASAL DAILY
Qty: 16 G | Refills: 5 | Status: SHIPPED | OUTPATIENT
Start: 2023-10-11

## 2023-10-28 ENCOUNTER — PATIENT MESSAGE (OUTPATIENT)
Dept: MEDICAL GROUP | Facility: MEDICAL CENTER | Age: 43
End: 2023-10-28
Payer: MEDICAID

## 2023-10-28 DIAGNOSIS — F17.200 NICOTINE DEPENDENCE DUE TO VAPING NON-TOBACCO PRODUCT: ICD-10-CM

## 2023-10-28 DIAGNOSIS — F51.01 PRIMARY INSOMNIA: ICD-10-CM

## 2023-10-31 RX ORDER — VARENICLINE TARTRATE 0.5 (11)-1
KIT ORAL
Qty: 53 EACH | Refills: 0 | Status: SHIPPED
Start: 2023-10-31 | End: 2023-11-30

## 2023-10-31 RX ORDER — TRAZODONE HYDROCHLORIDE 100 MG/1
200 TABLET ORAL NIGHTLY
Qty: 60 TABLET | Refills: 1 | Status: SHIPPED
Start: 2023-10-31 | End: 2023-11-30 | Stop reason: DRUGHIGH

## 2023-10-31 RX ORDER — VARENICLINE TARTRATE 1 MG/1
1 TABLET, FILM COATED ORAL 2 TIMES DAILY
Qty: 60 TABLET | Refills: 3 | Status: SHIPPED | OUTPATIENT
Start: 2023-10-31

## 2023-11-09 DIAGNOSIS — M54.50 CHRONIC RIGHT-SIDED LOW BACK PAIN WITHOUT SCIATICA: ICD-10-CM

## 2023-11-09 DIAGNOSIS — G89.29 CHRONIC RIGHT-SIDED LOW BACK PAIN WITHOUT SCIATICA: ICD-10-CM

## 2023-11-10 RX ORDER — IBUPROFEN 600 MG/1
TABLET ORAL
Qty: 30 TABLET | Refills: 3 | Status: SHIPPED | OUTPATIENT
Start: 2023-11-10

## 2023-11-21 ENCOUNTER — EH NON-PROVIDER (OUTPATIENT)
Dept: OCCUPATIONAL MEDICINE | Facility: CLINIC | Age: 43
End: 2023-11-21

## 2023-11-21 ENCOUNTER — HOSPITAL ENCOUNTER (OUTPATIENT)
Facility: MEDICAL CENTER | Age: 43
End: 2023-11-21
Attending: PHYSICIAN ASSISTANT
Payer: COMMERCIAL

## 2023-11-21 ENCOUNTER — EMPLOYEE HEALTH (OUTPATIENT)
Dept: OCCUPATIONAL MEDICINE | Facility: CLINIC | Age: 43
End: 2023-11-21

## 2023-11-21 DIAGNOSIS — Z02.89 ENCOUNTER FOR OCCUPATIONAL HEALTH ASSESSMENT: Primary | ICD-10-CM

## 2023-11-21 DIAGNOSIS — Z02.89 ENCOUNTER FOR OCCUPATIONAL HEALTH ASSESSMENT: ICD-10-CM

## 2023-11-21 PROCEDURE — 80305 DRUG TEST PRSMV DIR OPT OBS: CPT | Performed by: PHYSICIAN ASSISTANT

## 2023-11-21 PROCEDURE — 8915 PR COMPREHENSIVE PHYSICAL: Performed by: PREVENTIVE MEDICINE

## 2023-11-21 PROCEDURE — 86480 TB TEST CELL IMMUN MEASURE: CPT | Performed by: PREVENTIVE MEDICINE

## 2023-11-22 LAB
GAMMA INTERFERON BACKGROUND BLD IA-ACNC: 0.02 IU/ML
M TB IFN-G BLD-IMP: NEGATIVE
M TB IFN-G CD4+ BCKGRND COR BLD-ACNC: 0 IU/ML
MITOGEN IGNF BCKGRD COR BLD-ACNC: 1.92 IU/ML
QFT TB2 - NIL TBQ2: 0.01 IU/ML

## 2023-11-30 ENCOUNTER — TELEMEDICINE (OUTPATIENT)
Dept: MEDICAL GROUP | Facility: MEDICAL CENTER | Age: 43
End: 2023-11-30
Attending: INTERNAL MEDICINE
Payer: MEDICAID

## 2023-11-30 VITALS — HEIGHT: 60 IN | BODY MASS INDEX: 18.85 KG/M2 | WEIGHT: 96 LBS | RESPIRATION RATE: 16 BRPM

## 2023-11-30 DIAGNOSIS — R11.0 NAUSEA: ICD-10-CM

## 2023-11-30 DIAGNOSIS — F33.1 MODERATE EPISODE OF RECURRENT MAJOR DEPRESSIVE DISORDER (HCC): ICD-10-CM

## 2023-11-30 DIAGNOSIS — F51.01 PRIMARY INSOMNIA: ICD-10-CM

## 2023-11-30 DIAGNOSIS — F17.200 NICOTINE DEPENDENCE DUE TO VAPING NON-TOBACCO PRODUCT: ICD-10-CM

## 2023-11-30 PROCEDURE — 99214 OFFICE O/P EST MOD 30 MIN: CPT | Performed by: INTERNAL MEDICINE

## 2023-11-30 RX ORDER — DOXEPIN HYDROCHLORIDE 10 MG/1
10 CAPSULE ORAL NIGHTLY PRN
Qty: 30 CAPSULE | Refills: 0 | Status: SHIPPED
Start: 2023-11-30 | End: 2024-03-26

## 2023-11-30 RX ORDER — ONDANSETRON 4 MG/1
4 TABLET, FILM COATED ORAL EVERY 4 HOURS PRN
Qty: 30 TABLET | Refills: 3 | Status: SHIPPED | OUTPATIENT
Start: 2023-11-30

## 2023-11-30 RX ORDER — TRAZODONE HYDROCHLORIDE 50 MG/1
TABLET ORAL
Qty: 15 TABLET | Refills: 1 | Status: SHIPPED
Start: 2023-11-30 | End: 2024-02-07

## 2023-11-30 RX ORDER — TRAZODONE HYDROCHLORIDE 150 MG/1
TABLET ORAL
Qty: 30 TABLET | Refills: 1 | Status: SHIPPED | OUTPATIENT
Start: 2023-11-30 | End: 2024-02-07 | Stop reason: DRUGHIGH

## 2023-11-30 NOTE — ASSESSMENT & PLAN NOTE
Able to quit her vape with chantix.  Reports all cravings have gone away, denies side effects and would like to continue.

## 2023-11-30 NOTE — ASSESSMENT & PLAN NOTE
Continues with severe insomnia.  Has been trying to taper off trazodone as it is not effective but with dose reduction to 150 mg, experienced severe nausea and vomiting.  Even felt this with alternating 200 and 150 mg doses.  Has also tried seroquel, ambien, xanax, clonazepam.  Seroquel worked for a while then stopped, others have been ineffective.  She prefers to avoid controlled substances.  Has a therapist but has not discussed insomnia.

## 2023-11-30 NOTE — ASSESSMENT & PLAN NOTE
Did not notice much benefit with prozac 10 mg over the past month.  Life stressors have somewhat improved and she prefers to stop medication at this time.

## 2024-01-05 DIAGNOSIS — G43.009 MIGRAINE WITHOUT AURA AND WITHOUT STATUS MIGRAINOSUS, NOT INTRACTABLE: ICD-10-CM

## 2024-01-09 RX ORDER — TOPIRAMATE 100 MG/1
TABLET, FILM COATED ORAL
Qty: 30 TABLET | Refills: 5 | Status: SHIPPED | OUTPATIENT
Start: 2024-01-09

## 2024-01-12 ENCOUNTER — PATIENT MESSAGE (OUTPATIENT)
Dept: MEDICAL GROUP | Facility: MEDICAL CENTER | Age: 44
End: 2024-01-12
Payer: MEDICAID

## 2024-01-12 DIAGNOSIS — S16.1XXA ACUTE STRAIN OF NECK MUSCLE, INITIAL ENCOUNTER: ICD-10-CM

## 2024-01-12 RX ORDER — CYCLOBENZAPRINE HCL 5 MG
5-10 TABLET ORAL 3 TIMES DAILY PRN
Qty: 30 TABLET | Refills: 0 | Status: SHIPPED | OUTPATIENT
Start: 2024-01-12 | End: 2024-03-26 | Stop reason: SDUPTHER

## 2024-01-12 NOTE — PATIENT COMMUNICATION
Received request via: patient     Was the patient seen in the last year in this department? Yes    Does the patient have an active prescription (recently filled or refills available) for medication(s) requested? No    Does the patient have snf Plus and need 100 day supply (blood pressure, diabetes and cholesterol meds only)? Patient does not have SCP

## 2024-01-16 ENCOUNTER — PHARMACY VISIT (OUTPATIENT)
Dept: PHARMACY | Facility: MEDICAL CENTER | Age: 44
End: 2024-01-16
Payer: COMMERCIAL

## 2024-01-16 PROCEDURE — RXMED WILLOW AMBULATORY MEDICATION CHARGE: Performed by: INTERNAL MEDICINE

## 2024-01-16 RX ORDER — FLUTICASONE PROPIONATE 50 MCG
SPRAY, SUSPENSION (ML) NASAL
Qty: 15.8 ML | Refills: 0 | Status: SHIPPED | OUTPATIENT
Start: 2024-01-16 | End: 2024-02-07

## 2024-01-16 RX ORDER — MOMETASONE FUROATE 50 UG/1
SPRAY, METERED NASAL
Qty: 17 G | Refills: 0 | OUTPATIENT
Start: 2024-01-16

## 2024-01-16 RX ORDER — BENZONATATE 200 MG/1
CAPSULE ORAL
Qty: 21 CAPSULE | Refills: 0 | OUTPATIENT
Start: 2024-01-16

## 2024-01-16 RX ORDER — IPRATROPIUM BROMIDE 42 UG/1
SPRAY, METERED NASAL
Qty: 15 ML | Refills: 0 | OUTPATIENT
Start: 2024-01-16

## 2024-01-16 RX ORDER — MOMETASONE FUROATE 50 UG/1
SPRAY, METERED NASAL
Qty: 17 G | Refills: 0 | Status: CANCELLED | OUTPATIENT
Start: 2024-01-16

## 2024-02-07 ENCOUNTER — TELEMEDICINE (OUTPATIENT)
Dept: MEDICAL GROUP | Facility: MEDICAL CENTER | Age: 44
End: 2024-02-07
Attending: INTERNAL MEDICINE
Payer: MEDICAID

## 2024-02-07 VITALS — BODY MASS INDEX: 18.85 KG/M2 | HEIGHT: 60 IN | WEIGHT: 96 LBS | RESPIRATION RATE: 16 BRPM

## 2024-02-07 DIAGNOSIS — N94.10 DYSPAREUNIA IN FEMALE: ICD-10-CM

## 2024-02-07 DIAGNOSIS — F51.01 PRIMARY INSOMNIA: ICD-10-CM

## 2024-02-07 DIAGNOSIS — N92.0 MENORRHAGIA WITH REGULAR CYCLE: ICD-10-CM

## 2024-02-07 PROCEDURE — 99214 OFFICE O/P EST MOD 30 MIN: CPT | Mod: 95 | Performed by: INTERNAL MEDICINE

## 2024-02-07 RX ORDER — TRAZODONE HYDROCHLORIDE 150 MG/1
75 TABLET ORAL NIGHTLY
Qty: 45 TABLET | Refills: 3 | Status: SHIPPED | OUTPATIENT
Start: 2024-02-07

## 2024-02-07 NOTE — ASSESSMENT & PLAN NOTE
She has been able to wean down to 75 mg of trazodone.  However, pharmacy gave her 100 mg dose so she is not able to get the exact amount.  She is requesting new prescription.

## 2024-02-07 NOTE — ASSESSMENT & PLAN NOTE
She presents today for follow-up of her heavy menses.  She reports that she is typically getting 1-2 cycles per month and they are lasting anywhere from 7 to 10 days.  While she is having her menses she gets severe pelvic pain to the point where she has to lay on the couch with a heating pad and NSAIDs and Tylenol do not help.  She has seen gynecology for this in the past, last in 2021.  At that time she had a normal pelvic ultrasound.  She tried an IUD but reports that her headaches got significantly worse with it and also the strings got lost so it was removed.  She is considering hysterectomy as she does not want to have any other children.  She is requesting new referral back to gynecology.

## 2024-02-07 NOTE — ASSESSMENT & PLAN NOTE
She reports pain when she is sexually active.  States that it feels deep and like something is being hit inside of her.  She is sore afterwards.  She has been using water-based lubricants because she notes vaginal dryness but states this has not been effective.  She has had pain with sex since her last  which was approximately 10 years ago but over the past 5 years it has gotten worse.

## 2024-02-07 NOTE — PROGRESS NOTES
Virtual Visit: Established Patient   This visit was conducted via Zoom using secure and encrypted videoconferencing technology.   The patient was in their home in the [facility].    The patient's identity was confirmed and verbal consent was obtained for this virtual visit.     Subjective:   CC: gyn referral    Danielle Barcenas is a 43 y.o. female presenting for evaluation and management of:    Menorrhagia with regular cycle  She presents today for follow-up of her heavy menses.  She reports that she is typically getting 1-2 cycles per month and they are lasting anywhere from 7 to 10 days.  While she is having her menses she gets severe pelvic pain to the point where she has to lay on the couch with a heating pad and NSAIDs and Tylenol do not help.  She has seen gynecology for this in the past, last in .  At that time she had a normal pelvic ultrasound.  She tried an IUD but reports that her headaches got significantly worse with it and also the strings got lost so it was removed.  She is considering hysterectomy as she does not want to have any other children.  She is requesting new referral back to gynecology.      Dyspareunia in female  She reports pain when she is sexually active.  States that it feels deep and like something is being hit inside of her.  She is sore afterwards.  She has been using water-based lubricants because she notes vaginal dryness but states this has not been effective.  She has had pain with sex since her last  which was approximately 10 years ago but over the past 5 years it has gotten worse.      Primary insomnia  She has been able to wean down to 75 mg of trazodone.  However, pharmacy gave her 100 mg dose so she is not able to get the exact amount.  She is requesting new prescription.       Current medicines (including changes today)  Current Outpatient Medications   Medication Sig Dispense Refill    traZODone (DESYREL) 150 MG Tab Take 0.5 Tablets by mouth every  evening. 45 Tablet 3    benzonatate (TESSALON) 200 MG capsule Take 1 capsule orally 3 times a day 21 Capsule 0    ipratropium (ATROVENT) 0.06 % Solution Take 2 spray(s) intranasally 4 times a day 15 mL 0    mometasone (NASONEX) 50 MCG/ACT nasal spray Take 2 spray(s) intranasally once a day 17 g 0    cyclobenzaprine (FLEXERIL) 5 mg tablet Take 1-2 Tablets by mouth 3 times a day as needed for Moderate Pain or Muscle Spasms. 30 Tablet 0    diclofenac sodium (VOLTAREN) 1 % Gel Apply 4 g topically in the morning, at noon, and at bedtime. 50 g 0    topiramate (TOPAMAX) 100 MG Tab TAKE ONE TABLET BY MOUTH EVERY DAY 30 Tablet 5    ondansetron (ZOFRAN) 4 MG Tab tablet Take 1 Tablet by mouth every four hours as needed for Nausea/Vomiting. 30 Tablet 3    doxepin (SINEQUAN) 10 MG Cap Take 1 Capsule by mouth at bedtime as needed (sleep). 30 Capsule 0    ibuprofen (MOTRIN) 600 MG Tab TAKE ONE TABLET BY MOUTH EVERY 8 HOURS AS NEEDED FOR MODERATE PAIN OR FOR HEADACHE 30 Tablet 3    varenicline (CHANTIX) 1 MG tablet Take 1 Tablet by mouth 2 times a day. 60 Tablet 3    fluticasone (FLONASE) 50 MCG/ACT nasal spray Administer 1 Spray into affected nostril(S) every day. 16 g 5    omeprazole (PRILOSEC) 20 MG delayed-release capsule Take 1 Capsule by mouth every day. 30 Capsule 0    ferrous sulfate 325 (65 Fe) MG EC tablet Take 1 Tablet by mouth every day. 30 Tablet 5     No current facility-administered medications for this visit.       Patient Active Problem List    Diagnosis Date Noted    Memory problem 10/10/2023    Moderate mixed hyperlipidemia not requiring statin therapy 03/22/2022    Chronic right-sided low back pain without sciatica 02/09/2022    Nicotine dependence due to vaping non-tobacco product 07/14/2021    Seasonal allergies 04/22/2020    Generalized anxiety disorder with panic attacks 02/19/2020    Primary insomnia 01/21/2020    Migraine without aura and without status migrainosus, not intractable 01/21/2020    Chronic  pelvic pain in female 01/21/2020    Menorrhagia with regular cycle 11/12/2019    Dyspareunia in female 11/12/2019    Constipation 11/12/2019    Moderate episode of recurrent major depressive disorder (HCC) 08/03/2017        Objective:   Resp 16   Ht 1.524 m (5')   Wt 43.5 kg (96 lb)   BMI 18.75 kg/m²     Physical Exam  Constitutional: Alert, no distress, well-groomed.  Skin: No rashes in visible areas.  Eye: Round. Conjunctiva clear, lids normal. No icterus.   ENMT: Lips pink without lesions, good dentition, moist mucous membranes. Phonation normal.  Neck: No masses, no thyromegaly. Moves freely without pain.  Respiratory: Unlabored respiratory effort, no cough or audible wheeze  Psych: Alert and oriented x3, normal affect and mood.     Assessment and Plan:   The following treatment plan was discussed:     1. Menorrhagia with regular cycle  Given last pelvic ultrasound was 3 years ago, discussed obtaining updated imaging to make sure she has not developed any fibroids.  Referral placed to gynecology.  We briefly discussed hysterectomy as an option which patient is interested in and encouraged her to also discuss this with gynecology.  - Referral to Gynecology  - US-PELVIC COMPLETE (TRANSABDOMINAL/TRANSVAGINAL) (COMBO); Future    2. Dyspareunia in female  Unclear etiology.  Would not expect significant vaginal dryness given she is not postmenopausal.  Encouraged her to continue with lubricant, will obtain ultrasound to evaluate for any structural abnormalities that could contribute.  Gynecology referral placed.  - Referral to Gynecology  - US-PELVIC COMPLETE (TRANSABDOMINAL/TRANSVAGINAL) (COMBO); Future    3. Primary insomnia  Has successfully weaned down to 75 mg daily.  New prescription for trazodone written.  - traZODone (DESYREL) 150 MG Tab; Take 0.5 Tablets by mouth every evening.  Dispense: 45 Tablet; Refill: 3      Follow-up: Return if symptoms worsen or fail to improve.

## 2024-02-27 ENCOUNTER — APPOINTMENT (OUTPATIENT)
Dept: RADIOLOGY | Facility: MEDICAL CENTER | Age: 44
End: 2024-02-27
Attending: INTERNAL MEDICINE
Payer: MEDICAID

## 2024-02-27 DIAGNOSIS — N94.10 DYSPAREUNIA IN FEMALE: ICD-10-CM

## 2024-02-27 DIAGNOSIS — N92.0 MENORRHAGIA WITH REGULAR CYCLE: ICD-10-CM

## 2024-02-27 PROCEDURE — 76830 TRANSVAGINAL US NON-OB: CPT

## 2024-03-26 ENCOUNTER — TELEMEDICINE (OUTPATIENT)
Dept: MEDICAL GROUP | Facility: MEDICAL CENTER | Age: 44
End: 2024-03-26
Attending: INTERNAL MEDICINE
Payer: MEDICAID

## 2024-03-26 VITALS — RESPIRATION RATE: 16 BRPM | BODY MASS INDEX: 18.85 KG/M2 | HEIGHT: 60 IN | WEIGHT: 96 LBS
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
## 2024-03-26 DIAGNOSIS — F51.01 PRIMARY INSOMNIA: ICD-10-CM

## 2024-03-26 DIAGNOSIS — F17.200 NICOTINE DEPENDENCE DUE TO VAPING NON-TOBACCO PRODUCT: ICD-10-CM

## 2024-03-26 RX ORDER — ZOLPIDEM TARTRATE 6.25 MG/1
6.25-12.5 TABLET, FILM COATED, EXTENDED RELEASE ORAL NIGHTLY PRN
Qty: 10 TABLET | Refills: 0 | Status: SHIPPED | OUTPATIENT
Start: 2024-03-26 | End: 2024-04-25

## 2024-03-26 RX ORDER — CYCLOBENZAPRINE HCL 5 MG
5-10 TABLET ORAL 3 TIMES DAILY PRN
Qty: 60 TABLET | Refills: 0 | Status: SHIPPED | OUTPATIENT
Start: 2024-03-26

## 2024-03-26 RX ORDER — VARENICLINE TARTRATE 0.5 (11)-1
KIT ORAL
Qty: 53 EACH | Refills: 0 | Status: SHIPPED | OUTPATIENT
Start: 2024-03-26

## 2024-03-26 NOTE — ASSESSMENT & PLAN NOTE
Took 1 month of Chantix and felt that it worked extremely well.  She was able to quit smoking however stopped the Chantix and restarted smoking.  She would like to get back on the Chantix.

## 2024-03-26 NOTE — ASSESSMENT & PLAN NOTE
She continues to complain of insomnia.  In the past she has tried trazodone, doxepin, and hydroxyzine.  Has been on clonazepam but does not desire to be on benzos.  She continues on trazodone and can get a few hours of sleep with this but is still having significant difficulty.   Her mom takes ambien without side effect.  She would like to try it for as needed use only if she goes several days without sleeping.
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
## 2024-03-27 ENCOUNTER — TELEPHONE (OUTPATIENT)
Dept: MEDICAL GROUP | Facility: MEDICAL CENTER | Age: 44
End: 2024-03-27
Payer: MEDICAID

## 2024-03-27 NOTE — TELEPHONE ENCOUNTER
DOCUMENTATION OF PAR STATUS:    1. Name of Medication & Dose: Varenicline Tatrate (Starter) 0.5 mg X 1 &1 mg x 42 tablets     2. Name of Prescription Coverage Company & phone #: [employer]     3. Date Prior Auth Submitted: 3/27/2024     4. What information was given to obtain insurance decision? Chart notes    5. Prior Auth Status? Pending    6. Patient Notified: N\A

## 2024-04-02 ENCOUNTER — TELEPHONE (OUTPATIENT)
Dept: MEDICAL GROUP | Facility: MEDICAL CENTER | Age: 44
End: 2024-04-02
Payer: MEDICAID

## 2024-04-02 DIAGNOSIS — F17.200 NICOTINE DEPENDENCE DUE TO VAPING NON-TOBACCO PRODUCT: ICD-10-CM

## 2024-04-02 RX ORDER — VARENICLINE TARTRATE 1 MG/1
TABLET, FILM COATED ORAL
Qty: 60 TABLET | Refills: 3 | Status: SHIPPED | OUTPATIENT
Start: 2024-04-02

## 2024-04-02 NOTE — TELEPHONE ENCOUNTER
MEDICATION PRIOR AUTHORIZATION NEEDED:    1. Name of Medication: chantix     2. Requested By (Name of Pharmacy): [facility]      3. Is insurance on file current? [employer]     4. What is the name & phone number of the 3rd party payor? [employer]   Spoke with [facility] pharmacy, Insurance is requiring Prior auth for starter pack chantix, test claim shows Starter pack is only covered one time in rolling year. Test claims on 0.5 and 1mg covered. Will need new RX. Will send [employer] message to Patient as she has been asking.

## 2024-04-14 ENCOUNTER — PATIENT MESSAGE (OUTPATIENT)
Dept: MEDICAL GROUP | Facility: MEDICAL CENTER | Age: 44
End: 2024-04-14
Payer: MEDICAID

## 2024-06-12 ENCOUNTER — PATIENT MESSAGE (OUTPATIENT)
Dept: MEDICAL GROUP | Facility: MEDICAL CENTER | Age: 44
End: 2024-06-12
Payer: MEDICAID

## 2024-06-12 DIAGNOSIS — M54.50 CHRONIC RIGHT-SIDED LOW BACK PAIN WITHOUT SCIATICA: ICD-10-CM
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
## 2024-06-12 DIAGNOSIS — R11.0 NAUSEA: ICD-10-CM

## 2024-06-12 DIAGNOSIS — F51.01 PRIMARY INSOMNIA: ICD-10-CM

## 2024-06-12 DIAGNOSIS — G89.29 CHRONIC RIGHT-SIDED LOW BACK PAIN WITHOUT SCIATICA: ICD-10-CM

## 2024-06-12 DIAGNOSIS — F17.200 NICOTINE DEPENDENCE DUE TO VAPING NON-TOBACCO PRODUCT: ICD-10-CM

## 2024-06-13 RX ORDER — CYCLOBENZAPRINE HCL 5 MG
5-10 TABLET ORAL 3 TIMES DAILY PRN
Qty: 60 TABLET | Refills: 1 | Status: SHIPPED | OUTPATIENT
Start: 2024-06-13

## 2024-06-13 RX ORDER — ZOLPIDEM TARTRATE 6.25 MG/1
6.25 TABLET, FILM COATED, EXTENDED RELEASE ORAL NIGHTLY PRN
Qty: 14 TABLET | Refills: 1 | Status: SHIPPED | OUTPATIENT
Start: 2024-06-13 | End: 2024-08-12

## 2024-06-13 RX ORDER — IBUPROFEN 600 MG/1
TABLET ORAL
Qty: 60 TABLET | Refills: 1 | Status: SHIPPED | OUTPATIENT
Start: 2024-06-13

## 2024-06-13 RX ORDER — VARENICLINE TARTRATE 1 MG/1
1 TABLET, FILM COATED ORAL 2 TIMES DAILY
Qty: 60 TABLET | Refills: 3 | Status: SHIPPED | OUTPATIENT
Start: 2024-06-13

## 2024-06-13 RX ORDER — ONDANSETRON 4 MG/1
4 TABLET, FILM COATED ORAL EVERY 4 HOURS PRN
Qty: 30 TABLET | Refills: 3 | Status: SHIPPED | OUTPATIENT
Start: 2024-06-13

## 2024-06-13 NOTE — TELEPHONE ENCOUNTER
Received request via: Patient    Was the patient seen in the last year in this department? Yes    Does the patient have an active prescription (recently filled or refills available) for medication(s) requested? No    [facility] Name[facility]    Does the patient have residential Plus and need 100 day supply (blood pressure, diabetes and cholesterol meds only)? Patient does not have SCP

## 2024-06-13 NOTE — TELEPHONE ENCOUNTER
Received request via: Patient    Was the patient seen in the last year in this department? Yes    Does the patient have an active prescription (recently filled or refills available) for medication(s) requested? No    [facility]    Does the patient have snf Plus and need 100 day supply (blood pressure, diabetes and cholesterol meds only)? Patient does not have SCP

## 2024-07-20 DIAGNOSIS — F51.01 PRIMARY INSOMNIA: ICD-10-CM

## 2024-07-20 DIAGNOSIS — G43.009 MIGRAINE WITHOUT AURA AND WITHOUT STATUS MIGRAINOSUS, NOT INTRACTABLE: ICD-10-CM

## 2024-07-23 DIAGNOSIS — F51.01 PRIMARY INSOMNIA: ICD-10-CM

## 2024-07-23 DIAGNOSIS — G43.009 MIGRAINE WITHOUT AURA AND WITHOUT STATUS MIGRAINOSUS, NOT INTRACTABLE: ICD-10-CM

## 2024-07-23 RX ORDER — TOPIRAMATE 100 MG/1
100 TABLET, FILM COATED ORAL
Qty: 30 TABLET | Refills: 5 | OUTPATIENT
Start: 2024-07-23

## 2024-07-23 RX ORDER — TOPIRAMATE 100 MG/1
TABLET, FILM COATED ORAL
Qty: 30 TABLET | Refills: 5 | Status: SHIPPED | OUTPATIENT
Start: 2024-07-23

## 2024-07-23 RX ORDER — TRAZODONE HYDROCHLORIDE 150 MG/1
75 TABLET ORAL NIGHTLY
Qty: 45 TABLET | Refills: 3 | OUTPATIENT
Start: 2024-07-23

## 2024-07-23 RX ORDER — TRAZODONE HYDROCHLORIDE 100 MG/1
100 TABLET ORAL NIGHTLY
Qty: 30 TABLET | Refills: 5 | Status: SHIPPED | OUTPATIENT
Start: 2024-07-23

## 2024-08-01 ENCOUNTER — APPOINTMENT (OUTPATIENT)
Dept: OBGYN | Facility: CLINIC | Age: 44
End: 2024-08-01
Payer: MEDICAID

## 2024-08-01 ENCOUNTER — HOSPITAL ENCOUNTER (OUTPATIENT)
Facility: MEDICAL CENTER | Age: 44
End: 2024-08-01
Attending: OBSTETRICS & GYNECOLOGY
Payer: MEDICAID

## 2024-08-01 VITALS — SYSTOLIC BLOOD PRESSURE: 98 MMHG | BODY MASS INDEX: 19.53 KG/M2 | WEIGHT: 100 LBS | DIASTOLIC BLOOD PRESSURE: 56 MMHG

## 2024-08-01 DIAGNOSIS — R93.89 THICKENED ENDOMETRIUM: ICD-10-CM

## 2024-08-01 DIAGNOSIS — N93.9 ABNORMAL UTERINE BLEEDING (AUB): Primary | ICD-10-CM

## 2024-08-01 LAB
POCT INT CON NEG: NEGATIVE
POCT INT CON POS: POSITIVE
POCT URINE PREGNANCY TEST: NEGATIVE

## 2024-08-01 PROCEDURE — 81025 URINE PREGNANCY TEST: CPT | Performed by: OBSTETRICS & GYNECOLOGY

## 2024-08-01 PROCEDURE — 3074F SYST BP LT 130 MM HG: CPT | Performed by: OBSTETRICS & GYNECOLOGY

## 2024-08-01 PROCEDURE — 99203 OFFICE O/P NEW LOW 30 MIN: CPT | Performed by: OBSTETRICS & GYNECOLOGY

## 2024-08-01 PROCEDURE — 87591 N.GONORRHOEAE DNA AMP PROB: CPT

## 2024-08-01 PROCEDURE — 3078F DIAST BP <80 MM HG: CPT | Performed by: OBSTETRICS & GYNECOLOGY

## 2024-08-01 PROCEDURE — 87491 CHLMYD TRACH DNA AMP PROBE: CPT

## 2024-08-01 NOTE — PROGRESS NOTES
Chief complaint; new patient    Danielle Barcenas is a 44 y.o.  who presents complaining of irregular menstrual cycles patient states that her menstrual cycles last from 7 to 9 days typically and she sometimes has to menstrual cycles a month.  This past month menstrual cycle came very late than her menstrual cycle is very short.  Patient states she uses condoms for birth control and she is in a monogamous relationship.  Patient states that she has pain in her lower abdomen not necessarily associated with her menstrual cycles but it becomes very uncomfortable.    Patient had transvaginal ultrasound 2024 which shows slightly thickened endometrial lining but otherwise normal.    Patient is sexually active-no dyspareunia    Patient did use IUD progesterone containing?  States she had migraine headaches and strings were missing so they removed it        Review of systems; denies fever chills abdominal pain, denies chest pain shortness of breath or urinary symptoms  Past medical history-  Past Medical History:   Diagnosis Date    Anxiety     Depression     Insomnia     Migraine      Past surgical history-  Past Surgical History:   Procedure Laterality Date    ORIF, ANKLE Left     ORIF, WRIST Left     orif  after horse riding accident    OTHER ORTHOPEDIC SURGERY      PRIMARY C SECTION      x2     Allergies-Toradol  Medications-  Current Outpatient Medications on File Prior to Visit   Medication Sig Dispense Refill    traZODone (DESYREL) 100 MG Tab Take 1 Tablet by mouth every evening. 30 Tablet 5    topiramate (TOPAMAX) 100 MG Tab TAKE ONE TABLET BY MOUTH EVERY DAY 30 Tablet 5    ondansetron (ZOFRAN) 4 MG Tab tablet Take 1 Tablet by mouth every four hours as needed for Nausea/Vomiting. 30 Tablet 3    cyclobenzaprine (FLEXERIL) 5 mg tablet Take 1-2 Tablets by mouth 3 times a day as needed for Moderate Pain or Muscle Spasms. 60 Tablet 1    varenicline (CHANTIX) 1 MG tablet Take 1 Tablet by mouth 2 times a  day. 60 Tablet 3    ibuprofen (MOTRIN) 600 MG Tab TAKE ONE TABLET BY MOUTH EVERY 8 HOURS AS NEEDED FOR MODERATE PAIN OR FOR HEADACHE 60 Tablet 1    zolpidem (AMBIEN CR) 6.25 MG CR tablet Take 1 Tablet by mouth at bedtime as needed for Sleep for up to 60 days. 14 Tablet 1    benzonatate (TESSALON) 200 MG capsule Take 1 capsule orally 3 times a day 21 Capsule 0    ipratropium (ATROVENT) 0.06 % Solution Take 2 spray(s) intranasally 4 times a day 15 mL 0    mometasone (NASONEX) 50 MCG/ACT nasal spray Take 2 spray(s) intranasally once a day 17 g 0    diclofenac sodium (VOLTAREN) 1 % Gel Apply 4 g topically in the morning, at noon, and at bedtime. 50 g 0    fluticasone (FLONASE) 50 MCG/ACT nasal spray Administer 1 Spray into affected nostril(S) every day. 16 g 5    omeprazole (PRILOSEC) 20 MG delayed-release capsule Take 1 Capsule by mouth every day. 30 Capsule 0    ferrous sulfate 325 (65 Fe) MG EC tablet Take 1 Tablet by mouth every day. 30 Tablet 5     No current facility-administered medications on file prior to visit.     Social history-  Social History     Socioeconomic History    Marital status: Single     Spouse name: Not on file    Number of children: Not on file    Years of education: Not on file    Highest education level: Not on file   Occupational History    Not on file   Tobacco Use    Smoking status: Never    Smokeless tobacco: Never   Vaping Use    Vaping status: Former    Substances: Nicotine    Devices: Pre-filled or refillable cartridge   Substance and Sexual Activity    Alcohol use: No     Alcohol/week: 0.0 oz    Drug use: No    Sexual activity: Yes     Partners: Male     Birth control/protection: Condom   Other Topics Concern    Not on file   Social History Narrative    ** Merged History Encounter **          Social Determinants of Health     Financial Resource Strain: Not on file   Food Insecurity: Not on file   Transportation Needs: Not on file   Physical Activity: Not on file   Stress: Not on file    Social Connections: Not on file   Intimate Partner Violence: Not on file   Housing Stability: Not on file     Past Family History-no history of breast or ovarian cancer    Physical examination;  Alert and oriented x3  General a thin well-developed well-nourished female in no apparent distress  Vitals:    08/01/24 1110   BP: 98/56   BP Location: Right arm   Patient Position: Sitting   BP Cuff Size: Small adult   Weight: 100 lb     Skin is warm and dry  Neck-supple  HEENT-head-atraumatic, normocephalic, EOMI, PERRLA  Cardiovascular-regular rate and rhythm, normal S1-S2, no murmurs or gallops  Lungs-clear to auscultation bilaterally  Back-negative CVA tenderness  Abdomen-nondistended positive bowel sounds soft nontender no masses or hepatosplenomegaly  Pelvic examination;  External genitalia-no visible lesions   Vagina-no blood or discharge  Cervix-no gross lesions-GC CT cultures taken  Uterus-normal size and shape, nontender  Adnexa without mass or tenderness  Extremities without cyanosis clubbing or edema  Neurologic exam grossly intact    POCT UA-negative leukocyte esterase, negative nitrites negative blood, negative hCG    Impression;  AUB  Thickened endometrium  Lower abdominal pain    Plan;  Check GC CT cultures  Needs endometrial biopsy  Patient is open to trying an IUD if we deem necessary-discussed other options including oral Provera Depo-Provera or OCPs.        Female chaperone present for entire examination and history

## 2024-08-02 LAB
C TRACH DNA GENITAL QL NAA+PROBE: NEGATIVE
N GONORRHOEA DNA GENITAL QL NAA+PROBE: NEGATIVE
SPECIMEN SOURCE: NORMAL

## 2024-08-17 DIAGNOSIS — F51.01 PRIMARY INSOMNIA: ICD-10-CM

## 2024-08-19 NOTE — TELEPHONE ENCOUNTER
Received request via: Pharmacy    Was the patient seen in the last year in this department? Yes    Does the patient have an active prescription (recently filled or refills available) for medication(s) requested? No    [facility]    Does the patient have USP Plus and need 100-day supply? (This applies to ALL medications) Patient does not have SCP

## 2024-08-20 RX ORDER — ZOLPIDEM TARTRATE 6.25 MG/1
6.25 TABLET, FILM COATED, EXTENDED RELEASE ORAL NIGHTLY PRN
Qty: 14 TABLET | Refills: 1 | Status: SHIPPED | OUTPATIENT
Start: 2024-08-20 | End: 2024-10-19

## 2024-08-28 ENCOUNTER — TELEPHONE (OUTPATIENT)
Dept: MEDICAL GROUP | Facility: MEDICAL CENTER | Age: 44
End: 2024-08-28
Payer: MEDICAID

## 2024-08-28 NOTE — TELEPHONE ENCOUNTER
DOCUMENTATION OF PAR STATUS:    1. Name of Medication & Dose:  zolpidem (AMBIEN CR) 6.25 MG CR tablet     2. Name of Prescription Coverage Company & phone #:     3. Date Prior Auth Submitted: 8/28/24    4. What information was given to obtain insurance decision? CHART NOTES,ICD-10 CODE, MED HX.    5. Prior Auth Status? Pending    6. Patient Notified: N\A

## 2024-09-05 ENCOUNTER — PATIENT MESSAGE (OUTPATIENT)
Dept: MEDICAL GROUP | Facility: MEDICAL CENTER | Age: 44
End: 2024-09-05

## 2024-09-05 ENCOUNTER — OFFICE VISIT (OUTPATIENT)
Dept: MEDICAL GROUP | Facility: MEDICAL CENTER | Age: 44
End: 2024-09-05
Attending: INTERNAL MEDICINE
Payer: MEDICAID

## 2024-09-05 VITALS
SYSTOLIC BLOOD PRESSURE: 100 MMHG | BODY MASS INDEX: 19.83 KG/M2 | RESPIRATION RATE: 16 BRPM | DIASTOLIC BLOOD PRESSURE: 60 MMHG | OXYGEN SATURATION: 100 % | HEART RATE: 58 BPM | HEIGHT: 60 IN | WEIGHT: 101 LBS | TEMPERATURE: 97.9 F

## 2024-09-05 DIAGNOSIS — J01.90 ACUTE NON-RECURRENT SINUSITIS, UNSPECIFIED LOCATION: ICD-10-CM

## 2024-09-05 DIAGNOSIS — F51.01 PRIMARY INSOMNIA: ICD-10-CM

## 2024-09-05 DIAGNOSIS — R19.7 DIARRHEA, UNSPECIFIED TYPE: ICD-10-CM

## 2024-09-05 DIAGNOSIS — F41.0 GENERALIZED ANXIETY DISORDER WITH PANIC ATTACKS: ICD-10-CM

## 2024-09-05 DIAGNOSIS — R10.2 CHRONIC PELVIC PAIN IN FEMALE: ICD-10-CM

## 2024-09-05 DIAGNOSIS — F41.1 GENERALIZED ANXIETY DISORDER WITH PANIC ATTACKS: ICD-10-CM

## 2024-09-05 DIAGNOSIS — R11.2 NAUSEA AND VOMITING, UNSPECIFIED VOMITING TYPE: ICD-10-CM

## 2024-09-05 DIAGNOSIS — G89.29 CHRONIC PELVIC PAIN IN FEMALE: ICD-10-CM

## 2024-09-05 PROBLEM — K59.00 CONSTIPATION: Status: RESOLVED | Noted: 2019-11-12 | Resolved: 2024-09-05

## 2024-09-05 PROCEDURE — 99215 OFFICE O/P EST HI 40 MIN: CPT | Performed by: INTERNAL MEDICINE

## 2024-09-05 PROCEDURE — 99213 OFFICE O/P EST LOW 20 MIN: CPT | Performed by: INTERNAL MEDICINE

## 2024-09-05 RX ORDER — PROMETHAZINE HYDROCHLORIDE 25 MG/1
25 TABLET ORAL EVERY 6 HOURS PRN
Qty: 30 TABLET | Refills: 0 | Status: SHIPPED | OUTPATIENT
Start: 2024-09-05

## 2024-09-05 RX ORDER — CLONAZEPAM 0.5 MG/1
0.5 TABLET ORAL
Qty: 20 TABLET | Refills: 0 | Status: SHIPPED | OUTPATIENT
Start: 2024-09-05 | End: 2024-10-05

## 2024-09-05 NOTE — ASSESSMENT & PLAN NOTE
She is currently taking the zolpidem and reports that it is helpful for her.  She is able to fall asleep around 830 and stay asleep until 3 or 3:30 AM.  She typically wakes up at 4 AM to get ready for her day.  She strongly desires to stop trazodone in the future.  She is using the Ambien about 50% of the time.

## 2024-09-05 NOTE — ASSESSMENT & PLAN NOTE
Reports that for the past several weeks she has had worsening nausea and vomiting.  She has had trouble eating but denies any weight loss.  She is still able to keep down water.  She has been taking Zofran with incomplete relief.  Reports most of the time she is just dry heaving.  This started when she became sick as discussed below.

## 2024-09-05 NOTE — ASSESSMENT & PLAN NOTE
She reports that starting on August 13, she began feeling sick.  Her children were sick at the same time but their symptoms resolved within a week.  After the first week of her symptoms which included cough, sore throat, runny nose and sinus congestion, diarrhea, nausea, and vomiting which left her bedbound for several days, she called Doctor Jen.  States that they did testing for COVID, influenza, and strep and she tested negative.  She felt well enough to return to work but was in contact with multiple patients who are sick including numerous COVID-positive patients and then started feeling poorly again.  She had Doctor Jen come out and visit her again and had repeat viral testing which was once again negative.  Over the past 1 week, she reports increased sinus pressure, severe headaches, teeth and jaw pain, and severe nasal congestion.  She also reports a lot of postnasal drip and cough with sore throat.

## 2024-09-05 NOTE — ASSESSMENT & PLAN NOTE
About 4 weeks ago, she started to feel very unwell.  She was having abdominal pain with cramping and diarrhea.  She reports that this has unfortunately persisted.  She is still having loose watery bowel movements about every 4 hours and waking up at night with diarrhea.  She denies blood in bowel movements.  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  She has not taken any antibiotics recently.  She has not had any out of country travel or consumed unfiltered or unpurified water.  She reports eating sushi frequently but does not feel like the symptoms coincided with that.  Her children were sick with similar symptoms about 4 weeks ago but have completely recovered.  She has not tried any over-the-counter medications to help with this.

## 2024-09-05 NOTE — PROGRESS NOTES
Subjective:   Danielle Barcenas is a 44 y.o. female here today for numerous issues below    Acute sinusitis  She reports that starting on August 13, she began feeling sick.  Her children were sick at the same time but their symptoms resolved within a week.  After the first week of her symptoms which included cough, sore throat, runny nose and sinus congestion, diarrhea, nausea, and vomiting which left her bedbound for several days, she called Doctor Jen.  States that they did testing for COVID, influenza, and strep and she tested negative.  She felt well enough to return to work but was in contact with multiple patients who are sick including numerous COVID-positive patients and then started feeling poorly again.  She had Doctor Jen come out and visit her again and had repeat viral testing which was once again negative.  Over the past 1 week, she reports increased sinus pressure, severe headaches, teeth and jaw pain, and severe nasal congestion.  She also reports a lot of postnasal drip and cough with sore throat.    Primary insomnia  She is currently taking the zolpidem and reports that it is helpful for her.  She is able to fall asleep around 830 and stay asleep until 3 or 3:30 AM.  She typically wakes up at 4 AM to get ready for her day.  She strongly desires to stop trazodone in the future.  She is using the Ambien about 50% of the time.      Diarrhea  About 4 weeks ago, she started to feel very unwell.  She was having abdominal pain with cramping and diarrhea.  She reports that this has unfortunately persisted.  She is still having loose watery bowel movements about every 4 hours and waking up at night with diarrhea.  She denies blood in bowel movements.  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  She has not taken any antibiotics recently.  She has not had any out of country travel or consumed unfiltered or unpurified water.  She reports eating sushi frequently but  does not feel like the symptoms coincided with that.  Her children were sick with similar symptoms about 4 weeks ago but have completely recovered.  She has not tried any over-the-counter medications to help with this.    Nausea & vomiting  Reports that for the past several weeks she has had worsening nausea and vomiting.  She has had trouble eating but denies any weight loss.  She is still able to keep down water.  She has been taking Zofran with incomplete relief.  Reports most of the time she is just dry heaving.  This started when she became sick as discussed below.    Chronic pelvic pain in female  She was able to see gynecology.  She was recommended to have an endometrial biopsy based on her pelvic ultrasound from February although at the time it was read as normal.  The gynecologist she saw thought that there was some slight endometrial thickening.  Patient states that there was some concern for cancer although this is not mentioned in the providers note.  She has this procedure scheduled for next week and she is very concerned and has been on more anxious surrounding this.       Current medicines (including changes today)  Current Outpatient Medications   Medication Sig Dispense Refill    amoxicillin-clavulanate (AUGMENTIN) 875-125 MG Tab Take 1 Tablet by mouth 2 times a day for 7 days. 14 Tablet 0    promethazine (PHENERGAN) 25 MG Tab Take 1 Tablet by mouth every 6 hours as needed for Nausea/Vomiting. 30 Tablet 0    clonazePAM (KLONOPIN) 0.5 MG Tab Take 1 Tablet by mouth 1 time a day as needed (severe anxiety, panic attack) for up to 30 days. 20 Tablet 0    zolpidem (AMBIEN CR) 6.25 MG CR tablet Take 1 Tablet by mouth at bedtime as needed for Sleep for up to 60 days. 14 Tablet 1    traZODone (DESYREL) 100 MG Tab Take 1 Tablet by mouth every evening. 30 Tablet 5    topiramate (TOPAMAX) 100 MG Tab TAKE ONE TABLET BY MOUTH EVERY DAY 30 Tablet 5    cyclobenzaprine (FLEXERIL) 5 mg tablet Take 1-2 Tablets by  mouth 3 times a day as needed for Moderate Pain or Muscle Spasms. 60 Tablet 1    ibuprofen (MOTRIN) 600 MG Tab TAKE ONE TABLET BY MOUTH EVERY 8 HOURS AS NEEDED FOR MODERATE PAIN OR FOR HEADACHE 60 Tablet 1    diclofenac sodium (VOLTAREN) 1 % Gel Apply 4 g topically in the morning, at noon, and at bedtime. 50 g 0    fluticasone (FLONASE) 50 MCG/ACT nasal spray Administer 1 Spray into affected nostril(S) every day. 16 g 5    omeprazole (PRILOSEC) 20 MG delayed-release capsule Take 1 Capsule by mouth every day. 30 Capsule 0    ondansetron (ZOFRAN) 4 MG Tab tablet Take 1 Tablet by mouth every four hours as needed for Nausea/Vomiting. 30 Tablet 3    ipratropium (ATROVENT) 0.06 % Solution Take 2 spray(s) intranasally 4 times a day (Patient not taking: Reported on 9/5/2024) 15 mL 0    mometasone (NASONEX) 50 MCG/ACT nasal spray Take 2 spray(s) intranasally once a day (Patient not taking: Reported on 9/5/2024) 17 g 0    ferrous sulfate 325 (65 Fe) MG EC tablet Take 1 Tablet by mouth every day. (Patient not taking: Reported on 9/5/2024) 30 Tablet 5     No current facility-administered medications for this visit.     She  has a past medical history of Anxiety, Depression, Insomnia, and Migraine.         Objective:     Vitals:    09/05/24 1001   BP: 100/60   Pulse: (!) 58   Resp: 16   Temp: 36.6 °C (97.9 °F)   SpO2: 100%     Body mass index is 19.73 kg/m².   Physical Exam:  Constitutional: Alert, appears unwell  Skin: Warm, dry, good turgor, no rashes in visible areas.  Eye: Equal, round and reactive, conjunctiva clear, lids normal.  ENMT: Lips without lesions, good dentition, oropharynx mildly injected, TM's clear bilaterally, tender to palpation over b/l maxillary and frontal sinuses  Neck: Trachea midline, no masses, no thyromegaly. No cervical or supraclavicular lymphadenopathy  Respiratory: Unlabored respiratory effort, lungs clear to auscultation, no wheezes, no ronchi.  Cardiovascular: Regular rate and rhythm, no  murmurs appreciated, no lower extremity edema  Abdomen: Soft, diffusely tender to palpation w/o rebound or guarding, no masses, no hepatosplenomegaly.      Assessment and Plan:   The following treatment plan was discussed    1. Acute non-recurrent sinusitis, unspecified location  With her current symptoms, I am concerned that she may have developed a bacterial sinusitis especially in light of this being her third week of sickness.  We discussed 5 to 7-day course of Augmentin.  Discussed taking this medication with food to avoid GI upset  - amoxicillin-clavulanate (AUGMENTIN) 875-125 MG Tab; Take 1 Tablet by mouth 2 times a day for 7 days.  Dispense: 14 Tablet; Refill: 0    2. Primary insomnia  Somewhat controlled with Ambien however on the nights when she does not take the Ambien she is still struggling significantly with sleep.  At this time, we discussed keeping her medication regimen the same but this is something we can further fine-tune in the future.  Could consider ramelteon    3. Diarrhea, unspecified type  Etiology is unclear.  Given chronicity, we discussed ruling out infectious etiology.  If testing comes back negative then she can start Imodium.  - C Diff by PCR rflx Toxin; Future  - CRYPTO/GIARDIA RAPID ASSAY; Future  - CALPROTECTIN,FECAL; Future    4. Nausea and vomiting, unspecified vomiting type  Uncontrolled with just the Zofran.  Discussed trial of Phenergan for breakthrough symptoms  - promethazine (PHENERGAN) 25 MG Tab; Take 1 Tablet by mouth every 6 hours as needed for Nausea/Vomiting.  Dispense: 30 Tablet; Refill: 0    5. Generalized anxiety disorder with panic attacks  Severe, uncontrolled lately.  She would like a small supply of low-dose clonazepam to have as emergency medication for panic attacks.  Her desire is to avoid daily benzodiazepine use.  - clonazePAM (KLONOPIN) 0.5 MG Tab; Take 1 Tablet by mouth 1 time a day as needed (severe anxiety, panic attack) for up to 30 days.  Dispense: 20  Tablet; Refill: 0    6. Chronic pelvic pain in female  Uncontrolled.  She plans to follow-up with gynecology next week for her endometrial biopsy.  She will let me know how this goes.  She may want to see a female provider after this and I let her know that she can reach out if she would like a referral to an alternative clinic (Dr. Ghanshyam Au or [facility])    Total 45 minutes face-to-face time spent with patient, with greater than 50% of the total time discussing patient's issues and symptoms as listed above in assessment and plan, as well as managing coordination of care for future evaluation and treatment.      Followup: Return in about 2 weeks (around 9/19/2024).

## 2024-09-05 NOTE — ASSESSMENT & PLAN NOTE
She was able to see gynecology.  She was recommended to have an endometrial biopsy based on her pelvic ultrasound from February although at the time it was read as normal.  The gynecologist she saw thought that there was some slight endometrial thickening.  Patient states that there was some concern for cancer although this is not mentioned in the providers note.  She has this procedure scheduled for next week and she is very concerned and has been on more anxious surrounding this.

## 2024-09-10 ENCOUNTER — PATIENT MESSAGE (OUTPATIENT)
Dept: MEDICAL GROUP | Facility: MEDICAL CENTER | Age: 44
End: 2024-09-10
Payer: MEDICAID

## 2024-09-12 ENCOUNTER — HOSPITAL ENCOUNTER (OUTPATIENT)
Facility: MEDICAL CENTER | Age: 44
End: 2024-09-12
Attending: OBSTETRICS & GYNECOLOGY
Payer: MEDICAID

## 2024-09-12 ENCOUNTER — GYNECOLOGY VISIT (OUTPATIENT)
Dept: OBGYN | Facility: CLINIC | Age: 44
End: 2024-09-12
Payer: MEDICAID

## 2024-09-12 VITALS — SYSTOLIC BLOOD PRESSURE: 102 MMHG | WEIGHT: 104 LBS | BODY MASS INDEX: 20.31 KG/M2 | DIASTOLIC BLOOD PRESSURE: 58 MMHG

## 2024-09-12 DIAGNOSIS — N93.9 ABNORMAL UTERINE BLEEDING (AUB): Primary | ICD-10-CM

## 2024-09-12 DIAGNOSIS — N93.9 ABNORMAL UTERINE BLEEDING (AUB): ICD-10-CM

## 2024-09-12 LAB
PATHOLOGY CONSULT NOTE: NORMAL
POCT INT CON NEG: NEGATIVE
POCT INT CON POS: POSITIVE
POCT URINE PREGNANCY TEST: NEGATIVE

## 2024-09-12 PROCEDURE — 81025 URINE PREGNANCY TEST: CPT | Performed by: OBSTETRICS & GYNECOLOGY

## 2024-09-12 PROCEDURE — 58100 BIOPSY OF UTERUS LINING: CPT | Performed by: OBSTETRICS & GYNECOLOGY

## 2024-09-12 PROCEDURE — 88305 TISSUE EXAM BY PATHOLOGIST: CPT

## 2024-09-12 NOTE — PROCEDURES
Procedure note; Pipelle endometrial biopsy    Indications; AUB    Informed consent obtained, consent signed    Pregnancy test negative    Bimanual exam reveals; axial uterus.  Normal size normal size    Vaginal speculum placed    Betadine prep the cervix and vagina    Single-toothed tenaculum placed on the anterior lip of the cervix    Pipelle introduced without difficulty x 2    Moderate tissue withdrawn    The patient tolerated the procedure well    Tissue sent to pathology    Followup as indicated after results available for pathology

## 2024-09-16 ENCOUNTER — PATIENT MESSAGE (OUTPATIENT)
Dept: MEDICAL GROUP | Facility: MEDICAL CENTER | Age: 44
End: 2024-09-16
Payer: MEDICAID

## 2024-09-17 ENCOUNTER — PATIENT MESSAGE (OUTPATIENT)
Dept: MEDICAL GROUP | Facility: MEDICAL CENTER | Age: 44
End: 2024-09-17
Payer: MEDICAID

## 2024-09-18 DIAGNOSIS — J30.2 SEASONAL ALLERGIES: ICD-10-CM

## 2024-09-19 RX ORDER — FLUTICASONE PROPIONATE 50 MCG
1 SPRAY, SUSPENSION (ML) NASAL DAILY
Qty: 16 G | Refills: 5 | Status: SHIPPED | OUTPATIENT
Start: 2024-09-19

## 2024-09-19 NOTE — TELEPHONE ENCOUNTER
Received request via: Patient    Was the patient seen in the last year in this department? Yes    Does the patient have an active prescription (recently filled or refills available) for medication(s) requested? No    Pharmacy Name: [facility]    Does the patient have senior living Plus and need 100-day supply? (This applies to ALL medications) Patient does not have [facility]    Future Appointments         Provider Department Center    10/3/2024 9:00 AM (Arrive by 8:45 AM) Vickey Sepulveda M.D. [facility]

## 2024-10-03 ENCOUNTER — OFFICE VISIT (OUTPATIENT)
Dept: OBGYN | Facility: CLINIC | Age: 44
End: 2024-10-03
Payer: MEDICAID

## 2024-10-03 VITALS — DIASTOLIC BLOOD PRESSURE: 62 MMHG | BODY MASS INDEX: 20.12 KG/M2 | WEIGHT: 103 LBS | SYSTOLIC BLOOD PRESSURE: 98 MMHG

## 2024-10-03 DIAGNOSIS — Z30.430 ENCOUNTER FOR IUD INSERTION: Primary | ICD-10-CM

## 2024-10-03 LAB
POCT INT CON NEG: NEGATIVE
POCT INT CON POS: POSITIVE
POCT URINE PREGNANCY TEST: NEGATIVE

## 2024-10-03 PROCEDURE — 81025 URINE PREGNANCY TEST: CPT | Performed by: OBSTETRICS & GYNECOLOGY

## 2024-10-03 PROCEDURE — 58300 INSERT INTRAUTERINE DEVICE: CPT | Performed by: OBSTETRICS & GYNECOLOGY

## 2024-10-15 ENCOUNTER — GYNECOLOGY VISIT (OUTPATIENT)
Dept: OBGYN | Facility: CLINIC | Age: 44
End: 2024-10-15
Payer: MEDICAID

## 2024-10-15 ENCOUNTER — TELEPHONE (OUTPATIENT)
Dept: OBGYN | Facility: CLINIC | Age: 44
End: 2024-10-15

## 2024-10-15 VITALS — SYSTOLIC BLOOD PRESSURE: 98 MMHG | WEIGHT: 109 LBS | BODY MASS INDEX: 21.29 KG/M2 | DIASTOLIC BLOOD PRESSURE: 56 MMHG

## 2024-10-15 DIAGNOSIS — Z30.431 IUD CHECK UP: ICD-10-CM

## 2024-10-15 PROCEDURE — 99212 OFFICE O/P EST SF 10 MIN: CPT | Performed by: OBSTETRICS & GYNECOLOGY

## 2024-10-15 PROCEDURE — 3078F DIAST BP <80 MM HG: CPT | Performed by: OBSTETRICS & GYNECOLOGY

## 2024-10-15 PROCEDURE — 3074F SYST BP LT 130 MM HG: CPT | Performed by: OBSTETRICS & GYNECOLOGY

## 2024-11-19 DIAGNOSIS — F51.01 PRIMARY INSOMNIA: ICD-10-CM

## 2024-11-19 NOTE — TELEPHONE ENCOUNTER
Received request via: Patient    Was the patient seen in the last year in this department? Yes    Does the patient have an active prescription (recently filled or refills available) for medication(s) requested? No    [facility]    Does the patient have long term Plus and need 100-day supply? (This applies to ALL medications) Patient does not have SCP

## 2024-11-21 RX ORDER — ZOLPIDEM TARTRATE 6.25 MG/1
6.25 TABLET, FILM COATED, EXTENDED RELEASE ORAL NIGHTLY PRN
Qty: 14 TABLET | Refills: 0 | Status: SHIPPED | OUTPATIENT
Start: 2024-11-21 | End: 2024-11-26 | Stop reason: SDUPTHER

## 2024-11-26 ENCOUNTER — OFFICE VISIT (OUTPATIENT)
Dept: MEDICAL GROUP | Facility: MEDICAL CENTER | Age: 44
End: 2024-11-26
Attending: INTERNAL MEDICINE
Payer: MEDICAID

## 2024-11-26 VITALS
WEIGHT: 106.3 LBS | OXYGEN SATURATION: 100 % | TEMPERATURE: 100.4 F | DIASTOLIC BLOOD PRESSURE: 60 MMHG | SYSTOLIC BLOOD PRESSURE: 92 MMHG | BODY MASS INDEX: 20.87 KG/M2 | RESPIRATION RATE: 16 BRPM | HEIGHT: 60 IN | HEART RATE: 63 BPM

## 2024-11-26 DIAGNOSIS — L70.9 ACNE, UNSPECIFIED ACNE TYPE: ICD-10-CM

## 2024-11-26 DIAGNOSIS — N93.9 VAGINAL BLEEDING: ICD-10-CM

## 2024-11-26 DIAGNOSIS — F51.01 PRIMARY INSOMNIA: ICD-10-CM

## 2024-11-26 DIAGNOSIS — K13.0 RASH ON LIPS: ICD-10-CM

## 2024-11-26 PROBLEM — R19.7 DIARRHEA: Status: RESOLVED | Noted: 2024-09-05 | Resolved: 2024-11-26

## 2024-11-26 PROBLEM — J01.90 ACUTE SINUSITIS: Status: RESOLVED | Noted: 2024-09-05 | Resolved: 2024-11-26

## 2024-11-26 PROCEDURE — 99214 OFFICE O/P EST MOD 30 MIN: CPT | Performed by: INTERNAL MEDICINE

## 2024-11-26 PROCEDURE — 99212 OFFICE O/P EST SF 10 MIN: CPT | Performed by: INTERNAL MEDICINE

## 2024-11-26 PROCEDURE — 3078F DIAST BP <80 MM HG: CPT | Performed by: INTERNAL MEDICINE

## 2024-11-26 PROCEDURE — 3074F SYST BP LT 130 MM HG: CPT | Performed by: INTERNAL MEDICINE

## 2024-11-26 RX ORDER — ZOLPIDEM TARTRATE 6.25 MG/1
6.25 TABLET, FILM COATED, EXTENDED RELEASE ORAL NIGHTLY PRN
Qty: 20 TABLET | Refills: 0 | Status: SHIPPED | OUTPATIENT
Start: 2024-12-13 | End: 2025-01-12

## 2024-11-26 RX ORDER — TRAZODONE HYDROCHLORIDE 100 MG/1
100 TABLET ORAL NIGHTLY
Qty: 30 TABLET | Refills: 5 | Status: SHIPPED | OUTPATIENT
Start: 2024-11-26

## 2024-11-26 RX ORDER — LEVONORGESTREL 52 MG/1
1 INTRAUTERINE DEVICE INTRAUTERINE ONCE
COMMUNITY

## 2024-11-26 RX ORDER — CLINDAMYCIN AND BENZOYL PEROXIDE 10; 50 MG/G; MG/G
GEL TOPICAL
Qty: 50 G | Refills: 0 | Status: SHIPPED | OUTPATIENT
Start: 2024-11-26

## 2024-11-26 ASSESSMENT — PATIENT HEALTH QUESTIONNAIRE - PHQ9
SUM OF ALL RESPONSES TO PHQ QUESTIONS 1-9: 0
3. TROUBLE FALLING OR STAYING ASLEEP OR SLEEPING TOO MUCH: NOT AT ALL
8. MOVING OR SPEAKING SO SLOWLY THAT OTHER PEOPLE COULD HAVE NOTICED. OR THE OPPOSITE, BEING SO FIGETY OR RESTLESS THAT YOU HAVE BEEN MOVING AROUND A LOT MORE THAN USUAL: NOT AT ALL
2. FEELING DOWN, DEPRESSED, IRRITABLE, OR HOPELESS: NOT AT ALL
7. TROUBLE CONCENTRATING ON THINGS, SUCH AS READING THE NEWSPAPER OR WATCHING TELEVISION: NOT AT ALL
5. POOR APPETITE OR OVEREATING: NOT AT ALL
4. FEELING TIRED OR HAVING LITTLE ENERGY: NOT AT ALL
SUM OF ALL RESPONSES TO PHQ9 QUESTIONS 1 AND 2: 0
6. FEELING BAD ABOUT YOURSELF - OR THAT YOU ARE A FAILURE OR HAVE LET YOURSELF OR YOUR FAMILY DOWN: NOT AL ALL
9. THOUGHTS THAT YOU WOULD BE BETTER OFF DEAD, OR OF HURTING YOURSELF: NOT AT ALL
1. LITTLE INTEREST OR PLEASURE IN DOING THINGS: NOT AT ALL

## 2024-11-26 NOTE — ASSESSMENT & PLAN NOTE
She reports that when she takes the Ambien she is able to sleep.  She has been receiving 14 tablets for 28 days and runs out a little bit early.  She would like to have slightly more quantity.  On days when she really needs to get something done in the morning she will take the Ambien that night.  Otherwise she will take her trazodone but she is only using the 100 mg nightly and states that pharmacy has been trying to dispense 150 mg to her.    She will be going to [place] for 2 weeks and needs an early refill on her Ambien because of this.

## 2024-11-26 NOTE — ASSESSMENT & PLAN NOTE
She reports that since she got her Mirena IUD placed in September she has had persistent vaginal bleeding.  It has waxed and waned in terms of heaviness but has never fully subsided.  She denies any associated pelvic pain.  She had a 1 week follow-up with her gynecologist postprocedure and confirmed IUD was correctly placed.

## 2024-11-26 NOTE — ASSESSMENT & PLAN NOTE
She reports that for the past 8 months she has noticed a area of acne on her left chin which she states has not healed up.  She has been using her usual mild cleanser and Aveeno moisturizer and has not changed any skin care products.  Briefly she tried a Neutrogena acne wash as well as an oil of Olay acne wash but reports that these dried her skin out and caused stinging.  She would like to touch base with dermatology regarding this.  She has not tried any other topical therapies.

## 2024-11-26 NOTE — ASSESSMENT & PLAN NOTE
She reports that for the past several months she has been experiencing swelling, cracking, and pain of her upper lip.  It seems to come and cycles.  She has no history of oral herpes or cold sores and she denies any blistering type rash but it usually happens on the left side.  The lip becomes swollen and chapped.  She has tried many over-the-counter products without improvement.  She is interested in seeing dermatology.

## 2024-11-26 NOTE — PROGRESS NOTES
Subjective:   Danielle Barcenas is a 44 y.o. female here today for multiple issues below    Vaginal bleeding  She reports that since she got her Mirena IUD placed in September she has had persistent vaginal bleeding.  It has waxed and waned in terms of heaviness but has never fully subsided.  She denies any associated pelvic pain.  She had a 1 week follow-up with her gynecologist postprocedure and confirmed IUD was correctly placed.      Acne  She reports that for the past 8 months she has noticed a area of acne on her left chin which she states has not healed up.  She has been using her usual mild cleanser and Aveeno moisturizer and has not changed any skin care products.  Briefly she tried a Neutrogena acne wash as well as an oil of Olay acne wash but reports that these dried her skin out and caused stinging.  She would like to touch base with dermatology regarding this.  She has not tried any other topical therapies.      Rash on lips  She reports that for the past several months she has been experiencing swelling, cracking, and pain of her upper lip.  It seems to come and cycles.  She has no history of oral herpes or cold sores and she denies any blistering type rash but it usually happens on the left side.  The lip becomes swollen and chapped.  She has tried many over-the-counter products without improvement.  She is interested in seeing dermatology.    Primary insomnia  She reports that when she takes the Ambien she is able to sleep.  She has been receiving 14 tablets for 28 days and runs out a little bit early.  She would like to have slightly more quantity.  On days when she really needs to get something done in the morning she will take the Ambien that night.  Otherwise she will take her trazodone but she is only using the 100 mg nightly and states that pharmacy has been trying to dispense 150 mg to her.    She will be going to [place] for 2 weeks and needs an early refill on her Ambien because of this.        Current medicines (including changes today)  Current Outpatient Medications   Medication Sig Dispense Refill    [START ON 12/13/2024] zolpidem (AMBIEN CR) 6.25 MG CR tablet Take 1 Tablet by mouth at bedtime as needed for Sleep for up to 30 days. 20 Tablet 0    traZODone (DESYREL) 100 MG Tab Take 1 Tablet by mouth every evening. 30 Tablet 5    clindamycin-benzoyl peroxide (BENZACLIN) gel Apply thin layer to area of acne on face nightly 50 g 0    levonorgestrel (MIRENA, 52 MG,) 20 MCG/DAY IUD 1 Each by Intrauterine route one time.      fluticasone (FLONASE) 50 MCG/ACT nasal spray Administer 1 Spray into affected nostril(S) every day. 16 g 5    promethazine (PHENERGAN) 25 MG Tab Take 1 Tablet by mouth every 6 hours as needed for Nausea/Vomiting. 30 Tablet 0    topiramate (TOPAMAX) 100 MG Tab TAKE ONE TABLET BY MOUTH EVERY DAY 30 Tablet 5    ondansetron (ZOFRAN) 4 MG Tab tablet Take 1 Tablet by mouth every four hours as needed for Nausea/Vomiting. 30 Tablet 3    cyclobenzaprine (FLEXERIL) 5 mg tablet Take 1-2 Tablets by mouth 3 times a day as needed for Moderate Pain or Muscle Spasms. 60 Tablet 1    ibuprofen (MOTRIN) 600 MG Tab TAKE ONE TABLET BY MOUTH EVERY 8 HOURS AS NEEDED FOR MODERATE PAIN OR FOR HEADACHE 60 Tablet 1    diclofenac sodium (VOLTAREN) 1 % Gel Apply 4 g topically in the morning, at noon, and at bedtime. 50 g 0    omeprazole (PRILOSEC) 20 MG delayed-release capsule Take 1 Capsule by mouth every day. 30 Capsule 0     No current facility-administered medications for this visit.     She  has a past medical history of Anxiety, Depression, Insomnia, and Migraine.         Objective:     Vitals:    11/26/24 0938   BP: 92/60   Pulse: 63   Resp: 16   Temp: 38 °C (100.4 °F)   SpO2: 100%     Body mass index is 20.76 kg/m².   Physical Exam:  Constitutional: Alert, no distress.  Skin: Warm, dry, good turgor, , mild acne on left chin  Eye: Equal, round and reactive, conjunctiva clear, lids normal.  ENMT: Lips  without lesions  Psych: Alert and oriented x3, normal affect and mood.    Assessment and Plan:   The following treatment plan was discussed    1. Vaginal bleeding  We discussed that persistent vaginal bleeding following IUD placement can be normal.  Because she is not having any associated pain and placement was confirmed to be proper following insertion, I do not think we need to image her at this point.  We discussed following up with her gynecologist if bleeding persists beyond the next 1 to 2 months.    2. Primary insomnia  Stable and controlled with current medication.  We will slightly increase the quantity of Ambien that she is receiving monthly.  Will allow for early refill with her upcoming vacation.  - zolpidem (AMBIEN CR) 6.25 MG CR tablet; Take 1 Tablet by mouth at bedtime as needed for Sleep for up to 30 days.  Dispense: 20 Tablet; Refill: 0  - traZODone (DESYREL) 100 MG Tab; Take 1 Tablet by mouth every evening.  Dispense: 30 Tablet; Refill: 5    3. Acne, unspecified acne type  We discussed trial of topical BenzaClin.  Dermatology referral placed at her request as well  - Referral to Dermatology  - clindamycin-benzoyl peroxide (BENZACLIN) gel; Apply thin layer to area of acne on face nightly  Dispense: 50 g; Refill: 0    4. Rash on lips  Unclear etiology.  Today she does not have it present however it is quite bothersome for her and she would like to be evaluated by dermatology.  Referral has been placed.  - Referral to Dermatology        Followup: Return in about 3 months (around 2/26/2025).

## 2025-01-20 DIAGNOSIS — J30.2 SEASONAL ALLERGIES: ICD-10-CM

## 2025-01-20 DIAGNOSIS — F51.01 PRIMARY INSOMNIA: ICD-10-CM

## 2025-01-21 RX ORDER — ZOLPIDEM TARTRATE 6.25 MG/1
6.25 TABLET, FILM COATED, EXTENDED RELEASE ORAL NIGHTLY PRN
Qty: 20 TABLET | Refills: 1 | Status: SHIPPED | OUTPATIENT
Start: 2025-01-21 | End: 2025-03-22

## 2025-01-21 RX ORDER — FLUTICASONE PROPIONATE 50 MCG
1 SPRAY, SUSPENSION (ML) NASAL DAILY
Qty: 16 G | Refills: 5 | Status: SHIPPED | OUTPATIENT
Start: 2025-01-21

## 2025-01-21 NOTE — TELEPHONE ENCOUNTER
Received request via: Pharmacy    Was the patient seen in the last year in this department? Yes    Does the patient have an active prescription (recently filled or refills available) for medication(s) requested? No    [facility]    Does the patient have prison Plus and need 100-day supply? (This applies to ALL medications) Patient does not have SCP

## 2025-02-03 DIAGNOSIS — G43.009 MIGRAINE WITHOUT AURA AND WITHOUT STATUS MIGRAINOSUS, NOT INTRACTABLE: ICD-10-CM

## 2025-02-05 RX ORDER — TOPIRAMATE 100 MG/1
TABLET, FILM COATED ORAL
Qty: 30 TABLET | Refills: 5 | Status: SHIPPED | OUTPATIENT
Start: 2025-02-05 | End: 2025-02-07

## 2025-02-05 NOTE — TELEPHONE ENCOUNTER
Received request via: Pharmacy    Was the patient seen in the last year in this department? Yes    Does the patient have an active prescription (recently filled or refills available) for medication(s) requested? No    [facility]    Does the patient have intermediate Plus and need 100-day supply? (This applies to ALL medications) Patient does not have SCP

## 2025-02-06 DIAGNOSIS — G43.009 MIGRAINE WITHOUT AURA AND WITHOUT STATUS MIGRAINOSUS, NOT INTRACTABLE: ICD-10-CM

## 2025-02-07 RX ORDER — TOPIRAMATE 100 MG/1
TABLET, FILM COATED ORAL
Qty: 30 TABLET | Refills: 5 | Status: SHIPPED | OUTPATIENT
Start: 2025-02-07

## 2025-02-07 NOTE — TELEPHONE ENCOUNTER
Received request via: Pharmacy    Was the patient seen in the last year in this department? Yes    Does the patient have an active prescription (recently filled or refills available) for medication(s) requested? No    [facility]    Does the patient have pen[facility]ent[facility] Plus and need 100-day supply? (This applies to ALL medications) Patient does not have SCP

## 2025-02-13 ENCOUNTER — TELEMEDICINE (OUTPATIENT)
Dept: MEDICAL GROUP | Facility: MEDICAL CENTER | Age: 45
End: 2025-02-13
Attending: INTERNAL MEDICINE
Payer: MEDICAID

## 2025-02-13 VITALS — BODY MASS INDEX: 22.46 KG/M2 | WEIGHT: 115 LBS

## 2025-02-13 DIAGNOSIS — F41.1 GENERALIZED ANXIETY DISORDER WITH PANIC ATTACKS: ICD-10-CM

## 2025-02-13 DIAGNOSIS — Z13.1 SCREENING FOR DIABETES MELLITUS: ICD-10-CM

## 2025-02-13 DIAGNOSIS — J22 LRTI (LOWER RESPIRATORY TRACT INFECTION): ICD-10-CM

## 2025-02-13 DIAGNOSIS — F41.0 GENERALIZED ANXIETY DISORDER WITH PANIC ATTACKS: ICD-10-CM

## 2025-02-13 DIAGNOSIS — Z13.220 SCREENING, LIPID: ICD-10-CM

## 2025-02-13 DIAGNOSIS — F33.2 SEVERE EPISODE OF RECURRENT MAJOR DEPRESSIVE DISORDER, WITHOUT PSYCHOTIC FEATURES (HCC): ICD-10-CM

## 2025-02-13 DIAGNOSIS — F51.01 PRIMARY INSOMNIA: ICD-10-CM

## 2025-02-13 PROBLEM — K13.0 RASH ON LIPS: Status: RESOLVED | Noted: 2024-11-26 | Resolved: 2025-02-13

## 2025-02-13 PROBLEM — N92.0 MENORRHAGIA WITH REGULAR CYCLE: Status: RESOLVED | Noted: 2019-11-12 | Resolved: 2025-02-13

## 2025-02-13 PROBLEM — N93.9 VAGINAL BLEEDING: Status: RESOLVED | Noted: 2024-11-26 | Resolved: 2025-02-13

## 2025-02-13 PROCEDURE — 99214 OFFICE O/P EST MOD 30 MIN: CPT | Mod: 95 | Performed by: INTERNAL MEDICINE

## 2025-02-13 RX ORDER — TRAZODONE HYDROCHLORIDE 50 MG/1
50 TABLET ORAL NIGHTLY
Qty: 30 TABLET | Refills: 0 | Status: SHIPPED | OUTPATIENT
Start: 2025-02-13

## 2025-02-13 RX ORDER — ALBUTEROL SULFATE 90 UG/1
INHALANT RESPIRATORY (INHALATION)
COMMUNITY
Start: 2025-02-04

## 2025-02-13 NOTE — ASSESSMENT & PLAN NOTE
She reports progressive worsening of her depression over the past 6 months.  She states that although she has been battling this for most of her life, she is feeling particularly low lately.  She denies suicidal ideation.  She is still able to care for her kids but she reports extremely low motivation, difficulty getting out of bed in the morning, anhedonia, easily feeling tearful and feeling very sad.  She would like to get in with a psychiatrist.  She has seen multiple psychiatrists in the past and has been on a multitude of medications.  She has made an appointment with a therapist [facility] and her first visit is on February 28.

## 2025-02-13 NOTE — ASSESSMENT & PLAN NOTE
She reports that starting on January 19, her daughter first became sick with an upper respiratory infection.  This gradually worsened and she was admitted to the hospital on January 24 and diagnosed with RSV.  On January 24, patient also developed a cough which was progressively worsening.  Her daughter was discharged on the 27th.  On the 28th patient called a mobile urgent care to her home.  States that they treated her with a course of amoxicillin.  About a week and a half ago, she felt like she was not getting better so she had them come out again.  They prescribed her a 7-day course of Augmentin which she completed about 3 days ago.  She reports that with both antibiotic she did not feel any better.  She continues to have a cough productive of greenish sputum.  She reports feeling some chest tightness in the mornings when she first wakes up which is relieved by the albuterol inhaler that they gave her but this does not persist throughout the day and she denies significant shortness of breath.  She has noticed intermittent fevers and both her and her kids but that maximum temperature she has measured has been 100.9 degrees.
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
## 2025-02-13 NOTE — ASSESSMENT & PLAN NOTE
She wants to stop her trazodone.  In the past, we have tried to wean her off of it but she has had a lot of nausea related to this.  However, 5 days ago she started breaking her 100 mg tablets in half and she has been taking 50 mg at night in addition to her Ambien.  She has been sleeping okay with this and she denies any withdrawal symptoms.

## 2025-02-14 NOTE — Clinical Note
REFERRAL APPROVAL NOTICE         Sent on February 14, 2025                   Danielle Zepedagibson Barcenas  [address]   [address] [facility] [address]  [place] [address]                   Dear MsFatemeh Barcenas,    After a careful review of the medical information and benefit coverage, [facility] has processed your referral. See below for additional details.    If applicable, you must be actively enrolled with your insurance for coverage of the authorized service. If you have any questions regarding your coverage, please contact your insurance directly.    REFERRAL INFORMATION   Referral #:  [id number]  Referred-To Department    Referred-By Provider:  Behavioral Health    Josefina De Leon M.D.   Behavioral Health Outpatient      [address]  [place] [address]  [phone] [address]  [place] [id number] [phone]    Referral Start Date:  02/13/2025  Referral End Date:   02/13/2026             SCHEDULING  If you do not already have an appointment, please call [phone] to make an appointment.     MORE INFORMATION  If you do not already have a [employer] account, sign up at: [email or web address]  You can access your medical information, make appointments, see lab results, billing information, and more.  If you have questions regarding this referral, please contact  the [facility] Referrals department at:             [phone]. Monday - Friday 8:00AM - 5:00PM.     Sincerely,    [facility]

## 2025-02-26 ENCOUNTER — PATIENT MESSAGE (OUTPATIENT)
Dept: MEDICAL GROUP | Facility: MEDICAL CENTER | Age: 45
End: 2025-02-26
Payer: MEDICAID

## 2025-02-26 DIAGNOSIS — F33.2 SEVERE EPISODE OF RECURRENT MAJOR DEPRESSIVE DISORDER, WITHOUT PSYCHOTIC FEATURES (HCC): ICD-10-CM

## 2025-02-26 DIAGNOSIS — F41.1 GENERALIZED ANXIETY DISORDER WITH PANIC ATTACKS: ICD-10-CM

## 2025-02-26 DIAGNOSIS — F41.0 GENERALIZED ANXIETY DISORDER WITH PANIC ATTACKS: ICD-10-CM

## 2025-02-27 NOTE — Clinical Note
REFERRAL APPROVAL NOTICE         Sent on February 27, 2025                   Danielle Shasha Brown  [address]   [address] [facility] [address]  [place] [address]                   Dear MsFatemeh Barcenas,    After a careful review of the medical information and benefit coverage, [facility] has processed your referral. See below for additional details.    If applicable, you must be actively enrolled with your insurance for coverage of the authorized service. If you have any questions regarding your coverage, please contact your insurance directly.    REFERRAL INFORMATION   Referral #:  [id number]  Referred-To Provider    Referred-By Provider:  Psychiatry & Neurology Psychiatry    Josefina De Leon M.D.   [facility]      [address]  [place] [address]  [phone] [address]  [place] [address]  [phone]    Referral Start Date:  02/26/2025  Referral End Date:   02/26/2026             SCHEDULING  If you do not already have an appointment, please call [phone] to make an appointment.     MORE INFORMATION  If you do not already have a [employer] account, sign up at: [email or web address]  You can access your medical information, make appointments, see lab results, billing information, and more.  If you have questions regarding this referral, please contact  the [facility] Referrals department at:             [phone]. Monday - Friday 8:00AM - 5:00PM.     Sincerely,    [facility]

## 2025-03-05 ENCOUNTER — RESULTS FOLLOW-UP (OUTPATIENT)
Dept: MEDICAL GROUP | Facility: MEDICAL CENTER | Age: 45
End: 2025-03-05
Payer: MEDICAID

## 2025-03-05 ENCOUNTER — HOSPITAL ENCOUNTER (OUTPATIENT)
Dept: RADIOLOGY | Facility: MEDICAL CENTER | Age: 45
End: 2025-03-05
Attending: INTERNAL MEDICINE
Payer: MEDICAID

## 2025-03-05 DIAGNOSIS — Z12.31 VISIT FOR SCREENING MAMMOGRAM: ICD-10-CM

## 2025-03-05 PROCEDURE — 77067 SCR MAMMO BI INCL CAD: CPT

## 2025-04-01 DIAGNOSIS — M54.50 CHRONIC RIGHT-SIDED LOW BACK PAIN WITHOUT SCIATICA: ICD-10-CM

## 2025-04-01 DIAGNOSIS — G89.29 CHRONIC RIGHT-SIDED LOW BACK PAIN WITHOUT SCIATICA: ICD-10-CM

## 2025-04-02 RX ORDER — IBUPROFEN 600 MG/1
TABLET, FILM COATED ORAL
Qty: 60 TABLET | Refills: 1 | Status: SHIPPED | OUTPATIENT
Start: 2025-04-02

## 2025-04-02 NOTE — TELEPHONE ENCOUNTER
Received request via: Pharmacy    Was the patient seen in the last year in this department? Yes    Does the patient have an active prescription (recently filled or refills available) for medication(s) requested? No    [facility]    Does the patient have snf Plus and need 100-day supply? (This applies to ALL medications) Patient does not have SCP

## 2025-05-15 ENCOUNTER — TELEMEDICINE (OUTPATIENT)
Dept: MEDICAL GROUP | Facility: MEDICAL CENTER | Age: 45
End: 2025-05-15
Attending: INTERNAL MEDICINE
Payer: MEDICAID

## 2025-05-15 VITALS — HEIGHT: 60 IN | BODY MASS INDEX: 22.58 KG/M2 | WEIGHT: 115 LBS | RESPIRATION RATE: 16 BRPM

## 2025-05-15 DIAGNOSIS — F90.9 ATTENTION DEFICIT HYPERACTIVITY DISORDER (ADHD), UNSPECIFIED ADHD TYPE: Primary | ICD-10-CM

## 2025-05-15 DIAGNOSIS — R63.5 WEIGHT GAIN: ICD-10-CM

## 2025-05-15 DIAGNOSIS — F51.01 PRIMARY INSOMNIA: ICD-10-CM

## 2025-05-15 PROBLEM — J22 LRTI (LOWER RESPIRATORY TRACT INFECTION): Status: RESOLVED | Noted: 2025-02-13 | Resolved: 2025-05-15

## 2025-05-15 PROCEDURE — 99214 OFFICE O/P EST MOD 30 MIN: CPT | Mod: 95 | Performed by: INTERNAL MEDICINE

## 2025-05-15 RX ORDER — DEXTROAMPHETAMINE SACCHARATE, AMPHETAMINE ASPARTATE, DEXTROAMPHETAMINE SULFATE AND AMPHETAMINE SULFATE 2.5; 2.5; 2.5; 2.5 MG/1; MG/1; MG/1; MG/1
10 TABLET ORAL DAILY
COMMUNITY

## 2025-05-15 RX ORDER — DEXTROAMPHETAMINE SACCHARATE, AMPHETAMINE ASPARTATE MONOHYDRATE, DEXTROAMPHETAMINE SULFATE AND AMPHETAMINE SULFATE 2.5; 2.5; 2.5; 2.5 MG/1; MG/1; MG/1; MG/1
10 CAPSULE, EXTENDED RELEASE ORAL EVERY MORNING
COMMUNITY
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
## 2025-05-16 NOTE — ASSESSMENT & PLAN NOTE
She has been working with psychiatry, reports new diagnosis of ADHD.  Has tried to nonstimulant medications which were not effective and recently started Adderall.  They are working on increasing her dose and she is not sure how effective it is so far.  Her current dose is 10 mg extended release in the morning and then 10 mg immediate release in the afternoon

## 2025-05-16 NOTE — ASSESSMENT & PLAN NOTE
She is working with psychiatry.  She got restarted on the trazodone because she was not sleeping at all.  They are working on weaning her back off of the Ambien.

## 2025-05-16 NOTE — ASSESSMENT & PLAN NOTE
She is concerned today about weight gain.  Over the past 2 years weight has varied from 90 pounds to 115 pounds where she is at today.  She states that she feels most comfortable around 90 pounds.  We discussed BMI is still in the normal range at her current weight.  She reports exercising regularly and eating a healthy diet free from processed foods.  She is not sure what has caused her weight gain.  She wonders if it could be her IUD as she noticed the weight gain corresponds with her IUD insertion.  However, her IUD has really helped with her painful menses.  We discussed that per up-to-date there can be a 6% incidence of weight gain with the Mirena IUD.  Unclear if she falls into this category.  Would not suspect weight gain with any of her other medications.  Last labs for thyroid were in 2017 and normal.

## 2025-06-24 ENCOUNTER — HOSPITAL ENCOUNTER (OUTPATIENT)
Dept: LAB | Facility: MEDICAL CENTER | Age: 45
End: 2025-06-24
Payer: MEDICAID

## 2025-06-24 ENCOUNTER — HOSPITAL ENCOUNTER (OUTPATIENT)
Dept: LAB | Facility: MEDICAL CENTER | Age: 45
End: 2025-06-24
Attending: INTERNAL MEDICINE
Payer: MEDICAID

## 2025-06-24 ENCOUNTER — OFFICE VISIT (OUTPATIENT)
Dept: MEDICAL GROUP | Facility: MEDICAL CENTER | Age: 45
End: 2025-06-24
Payer: MEDICAID

## 2025-06-24 VITALS
HEIGHT: 60 IN | RESPIRATION RATE: 16 BRPM | WEIGHT: 103.8 LBS | DIASTOLIC BLOOD PRESSURE: 60 MMHG | HEART RATE: 64 BPM | OXYGEN SATURATION: 100 % | TEMPERATURE: 99.1 F | SYSTOLIC BLOOD PRESSURE: 102 MMHG | BODY MASS INDEX: 20.38 KG/M2

## 2025-06-24 DIAGNOSIS — Z13.1 SCREENING FOR DIABETES MELLITUS: ICD-10-CM

## 2025-06-24 DIAGNOSIS — K52.9 GASTROENTERITIS: Primary | ICD-10-CM

## 2025-06-24 DIAGNOSIS — J30.2 SEASONAL ALLERGIES: Primary | ICD-10-CM

## 2025-06-24 DIAGNOSIS — F33.2 SEVERE EPISODE OF RECURRENT MAJOR DEPRESSIVE DISORDER, WITHOUT PSYCHOTIC FEATURES (HCC): ICD-10-CM

## 2025-06-24 DIAGNOSIS — Z13.220 SCREENING, LIPID: ICD-10-CM

## 2025-06-24 LAB
25(OH)D3 SERPL-MCNC: 28 NG/ML (ref 30–100)
ALBUMIN SERPL BCP-MCNC: 4.5 G/DL (ref 3.2–4.9)
ALBUMIN/GLOB SERPL: 1.6 G/DL
ALP SERPL-CCNC: 35 U/L (ref 30–99)
ALT SERPL-CCNC: 9 U/L (ref 2–50)
ANION GAP SERPL CALC-SCNC: 10 MMOL/L (ref 7–16)
AST SERPL-CCNC: 14 U/L (ref 12–45)
BASOPHILS # BLD AUTO: 0.7 % (ref 0–1.8)
BASOPHILS # BLD: 0.04 K/UL (ref 0–0.12)
BILIRUB SERPL-MCNC: 0.3 MG/DL (ref 0.1–1.5)
BUN SERPL-MCNC: 12 MG/DL (ref 8–22)
CALCIUM ALBUM COR SERPL-MCNC: 8.8 MG/DL (ref 8.5–10.5)
CALCIUM SERPL-MCNC: 9.2 MG/DL (ref 8.5–10.5)
CHLORIDE SERPL-SCNC: 106 MMOL/L (ref 96–112)
CHOLEST SERPL-MCNC: 170 MG/DL (ref 100–199)
CHOLEST SERPL-MCNC: 171 MG/DL (ref 100–199)
CO2 SERPL-SCNC: 22 MMOL/L (ref 20–33)
CREAT SERPL-MCNC: 0.8 MG/DL (ref 0.5–1.4)
EOSINOPHIL # BLD AUTO: 0.3 K/UL (ref 0–0.51)
EOSINOPHIL NFR BLD: 5.1 % (ref 0–6.9)
ERYTHROCYTE [DISTWIDTH] IN BLOOD BY AUTOMATED COUNT: 47.6 FL (ref 35.9–50)
EST. AVERAGE GLUCOSE BLD GHB EST-MCNC: 105 MG/DL
FOLATE SERPL-MCNC: 12.4 NG/ML
GFR SERPLBLD CREATININE-BSD FMLA CKD-EPI: 94 ML/MIN/1.73 M 2
GLOBULIN SER CALC-MCNC: 2.8 G/DL (ref 1.9–3.5)
GLUCOSE SERPL-MCNC: 85 MG/DL (ref 65–99)
HBA1C MFR BLD: 5.3 % (ref 4–5.6)
HCT VFR BLD AUTO: 40.5 % (ref 37–47)
HDLC SERPL-MCNC: 51 MG/DL
HDLC SERPL-MCNC: 53 MG/DL
HGB BLD-MCNC: 12.9 G/DL (ref 12–16)
IMM GRANULOCYTES # BLD AUTO: 0.01 K/UL (ref 0–0.11)
IMM GRANULOCYTES NFR BLD AUTO: 0.2 % (ref 0–0.9)
LDLC SERPL CALC-MCNC: 102 MG/DL
LDLC SERPL CALC-MCNC: 105 MG/DL
LYMPHOCYTES # BLD AUTO: 1.72 K/UL (ref 1–4.8)
LYMPHOCYTES NFR BLD: 29.4 % (ref 22–41)
MCH RBC QN AUTO: 27.5 PG (ref 27–33)
MCHC RBC AUTO-ENTMCNC: 31.9 G/DL (ref 32.2–35.5)
MCV RBC AUTO: 86.4 FL (ref 81.4–97.8)
MONOCYTES # BLD AUTO: 0.35 K/UL (ref 0–0.85)
MONOCYTES NFR BLD AUTO: 6 % (ref 0–13.4)
NEUTROPHILS # BLD AUTO: 3.43 K/UL (ref 1.82–7.42)
NEUTROPHILS NFR BLD: 58.6 % (ref 44–72)
NRBC # BLD AUTO: 0 K/UL
NRBC BLD-RTO: 0 /100 WBC (ref 0–0.2)
PLATELET # BLD AUTO: 251 K/UL (ref 164–446)
PMV BLD AUTO: 10.6 FL (ref 9–12.9)
POTASSIUM SERPL-SCNC: 4.2 MMOL/L (ref 3.6–5.5)
PROT SERPL-MCNC: 7.3 G/DL (ref 6–8.2)
RBC # BLD AUTO: 4.69 M/UL (ref 4.2–5.4)
SODIUM SERPL-SCNC: 138 MMOL/L (ref 135–145)
T3 SERPL-MCNC: 88.7 NG/DL (ref 60–181)
T4 FREE SERPL-MCNC: 0.98 NG/DL (ref 0.93–1.7)
TRIGL SERPL-MCNC: 74 MG/DL (ref 0–149)
TRIGL SERPL-MCNC: 75 MG/DL (ref 0–149)
TSH SERPL DL<=0.005 MIU/L-ACNC: 1.67 UIU/ML (ref 0.38–5.33)
TSH SERPL-ACNC: 1.66 UIU/ML (ref 0.38–5.33)
VIT B12 SERPL-MCNC: 381 PG/ML (ref 211–911)
WBC # BLD AUTO: 5.9 K/UL (ref 4.8–10.8)

## 2025-06-24 PROCEDURE — 3078F DIAST BP <80 MM HG: CPT | Performed by: FAMILY MEDICINE

## 2025-06-24 PROCEDURE — 36415 COLL VENOUS BLD VENIPUNCTURE: CPT

## 2025-06-24 PROCEDURE — 82746 ASSAY OF FOLIC ACID SERUM: CPT

## 2025-06-24 PROCEDURE — 80053 COMPREHEN METABOLIC PANEL: CPT

## 2025-06-24 PROCEDURE — 84480 ASSAY TRIIODOTHYRONINE (T3): CPT

## 2025-06-24 PROCEDURE — 80061 LIPID PANEL: CPT | Mod: 91

## 2025-06-24 PROCEDURE — 3074F SYST BP LT 130 MM HG: CPT | Performed by: FAMILY MEDICINE

## 2025-06-24 PROCEDURE — 84443 ASSAY THYROID STIM HORMONE: CPT

## 2025-06-24 PROCEDURE — 99212 OFFICE O/P EST SF 10 MIN: CPT | Performed by: FAMILY MEDICINE

## 2025-06-24 PROCEDURE — 82607 VITAMIN B-12: CPT

## 2025-06-24 PROCEDURE — 99213 OFFICE O/P EST LOW 20 MIN: CPT | Performed by: FAMILY MEDICINE

## 2025-06-24 PROCEDURE — 84443 ASSAY THYROID STIM HORMONE: CPT | Mod: 91

## 2025-06-24 PROCEDURE — 83036 HEMOGLOBIN GLYCOSYLATED A1C: CPT

## 2025-06-24 PROCEDURE — 85025 COMPLETE CBC W/AUTO DIFF WBC: CPT

## 2025-06-24 PROCEDURE — 82306 VITAMIN D 25 HYDROXY: CPT

## 2025-06-24 PROCEDURE — 84439 ASSAY OF FREE THYROXINE: CPT

## 2025-06-24 PROCEDURE — 80061 LIPID PANEL: CPT

## 2025-06-24 RX ORDER — MOMETASONE FUROATE MONOHYDRATE 50 UG/1
SPRAY, METERED NASAL
Qty: 17 G | Refills: 0 | Status: SHIPPED | OUTPATIENT
Start: 2025-06-24

## 2025-06-24 NOTE — PROGRESS NOTES
Verbal consent was acquired by the patient to use [employer] ambient listening note generation during this visit.    Subjective   Chief Complaint   Patient presents with    Abdominal Pain        HPI:   Danielle presents today with    History of Present Illness  The patient presents for evaluation of abdominal pain, nausea, and vomiting. She is accompanied by her daughter.    Abdominal Pain, Nausea, and Vomiting  She has been experiencing abdominal discomfort, nausea, and vomiting for several weeks. The onset of these symptoms was sudden, with no recent travel history. Her daughter first exhibited similar symptoms, including vomiting with a distinct sulfur-like odor, which lasted for approximately 6 days. Two days later, she began to experience the same symptoms, including severe abdominal pain, which she initially attributed to a stomach virus. The pain was so intense that it incapacitated her, confining her to bed. She also experienced diarrhea but did not vomit initially. Over the past 2.5 weeks, she has vomited twice due to the severity of the pain, not because of nausea.  - Onset: Sudden onset several weeks ago.  - Location: Abdominal area.  - Duration: Several weeks.  - Character: Severe abdominal pain, nausea, vomiting, diarrhea.  - Alleviating/Aggravating Factors: Pain subsided slightly after a week and a half, but recurred after consuming a full meal.  - Timing: Vomited twice over the past 2.5 weeks due to pain.  - Severity: Pain so intense it incapacitated her, confining her to bed.    Diarrhea  After a week and a half, the pain subsided slightly, allowing her to consume a full meal. However, this led to a recurrence of the pain and diarrhea the following morning. She noticed that her symptoms seemed to be food-triggered, even though she had not made any changes to her diet, which is generally healthy and devoid of processed or junk food. She continues to experience diarrhea, with the most recent episode  occurring yesterday morning. She has not had a bowel movement today. She denies any recent antibiotic use.  - Onset: Recurrence of pain and diarrhea after consuming a full meal.  - Duration: Diarrhea episodes ongoing, most recent yesterday morning.  - Character: Diarrhea, food-triggered symptoms.  - Alleviating/Aggravating Factors: No changes to diet, generally healthy diet.  - Timing: No bowel movement today.    Her daughter, on the other hand, does not seem to be in pain and only experiences brief episodes of diarrhea before returning to normal for the rest of the day. She is unsure if their conditions are related. She underwent lab tests ordered by Dr. De Leon earlier today. She has not taken any antidiarrheal medication and has never experienced a stomach virus before. She reports rumbling sounds in her abdomen and difficulty sleeping due to sharp pain when lying on her sides. She also reports feeling extremely bloated. She has not taken any antibiotics recently.      Health Maintenance Due   Topic Date Due    Chickenpox Vaccine (Varicella) (1 of 2 - 13+ 2-dose series) Never done    Hepatitis B Vaccine (Hep B) (1 of 3 - 19+ 3-dose series) Never done    COVID-19 Vaccine (3 - 2024-25 season) 09/01/2024       Objective   Social History[1]    Exam:  /60   Pulse 64   Temp 37.3 °C (99.1 °F)   Resp 16   Ht 1.524 m (5')   Wt 47.1 kg (103 lb 12.8 oz)   SpO2 100%   BMI 20.27 kg/m²     Physical Exam  Abdominal:      General: Abdomen is flat. Bowel sounds are increased. There is no distension.      Tenderness: There is generalized abdominal tenderness.      Comments: Mild tenderness to palpation        Constitutional: Alert, no distress  Skin: No rashes in visible areas  Eye: Conjunctiva clear, lids normal  Respiratory: Unlabored respiratory effort, no cough  MSK: Normal gait, moves all extremities  Psych: Alert and oriented x3, normal affect and mood    Allergies[2]    [id number] - [place], [place] - [address]  [address]  [place] [address]  Phone: [phone] Fax: [phone]    [id number] - [place] [address]  Phone: [phone] Fax: [phone]    Current Medications[3]    Assessment & Plan    44 y.o. female with the following -   1. Gastroenteritis          Assessment & Plan  1. Abdominal pain, nausea, and vomiting: Acute.  - Symptoms suggestive of gastroenteritis, possibly norovirus, given the recent community outbreak.  - Advised to consume smaller, more frequent meals throughout the day to ease digestive burden and avoid rich, heavy, fatty, and dairy foods.  - Recommended use of heating pads, rest, and maintaining good hand hygiene.  - Last episode of loose diarrhea was on 06/23/2025.  - Labs ordered earlier today to check electrolytes, thyroid levels, vitamin deficiencies, metabolic panel, and blood count.  - Advised to stay hydrated with fluids like water, Gatorade, or Pedialyte.  - Over-the-counter antidiarrheal medications such as Pepto-Bismol can be used if needed.    Follow-up  - Further evaluation and potential additional testing will be considered if symptoms persist beyond 1 to 2 weeks.    ER/Call/Return precautions discussed. She verbalized understanding and agreed with plan.      Return if symptoms worsen or fail to improve.    Please be advised that this document was generated using voice recognition software. While I have made reasonable efforts to correct any obvious errors, there may still be grammatical or content inaccuracies that were not identified or corrected prior to finalization.              [1]   Social History  Tobacco Use    Smoking status: Never    Smokeless tobacco: Never   Vaping Use    Vaping status: Former    Substances: Nicotine    Devices: Pre-filled or refillable cartridge   Substance Use Topics    Alcohol use: No     Alcohol/week: 0.0 oz    Drug use: No   [2]   Allergies  Allergen Reactions    Toradol  "Rash     Pt states \"first time I got a rash, second time I was throwing up\"   [3]   Current Outpatient Medications   Medication Sig Dispense Refill    mometasone (NASONEX) 50 MCG/ACT nasal spray Take 2 spray(s) intranasally once a day 17 g 0    amphetamine-dextroamphetamine (ADDERALL, 10MG,) 10 MG Tab Take 10 mg by mouth every day.      ibuprofen (MOTRIN) 600 MG Tab TAKE ONE TABLET BY MOUTH EVERY 8 HOURS AS NEEDED FOR MODERATE PAIN OR FOR HEADACHE 60 Tablet 1    traZODone (DESYREL) 50 MG Tab Take 1 Tablet by mouth every evening. 30 Tablet 0    albuterol 108 (90 Base) MCG/ACT Aero Soln inhalation aerosol INHALE 2 PUFFS BY MOUTH EVERY 4-6 HOURS AS NEEDED FOR COUGH, WHEEZING, OR SHORTNESS OF BREATH      topiramate (TOPAMAX) 100 MG Tab TAKE ONE TABLET BY MOUTH EVERY DAY 30 Tablet 5    fluticasone (FLONASE) 50 MCG/ACT nasal spray Administer 1 Spray into affected nostril(S) every day. 16 g 5    levonorgestrel (MIRENA, 52 MG,) 20 MCG/DAY IUD 1 Each by Intrauterine route one time.      promethazine (PHENERGAN) 25 MG Tab Take 1 Tablet by mouth every 6 hours as needed for Nausea/Vomiting. 30 Tablet 0    ondansetron (ZOFRAN) 4 MG Tab tablet Take 1 Tablet by mouth every four hours as needed for Nausea/Vomiting. 30 Tablet 3    cyclobenzaprine (FLEXERIL) 5 mg tablet Take 1-2 Tablets by mouth 3 times a day as needed for Moderate Pain or Muscle Spasms. 60 Tablet 1    omeprazole (PRILOSEC) 20 MG delayed-release capsule Take 1 Capsule by mouth every day. 30 Capsule 0    amphetamine-dextroamphetamine XR (ADDERALL XR, 10MG,) 10 MG CAPSULE SR 24 HR Take 10 mg by mouth every morning.      diclofenac sodium (VOLTAREN) 1 % Gel Apply 4 g topically in the morning, at noon, and at bedtime. 50 g 0     No current facility-administered medications for this visit.     "

## 2025-06-24 NOTE — TELEPHONE ENCOUNTER
Received request via: Pharmacy    Was the patient seen in the last year in this department? Yes    Does the patient have an active prescription (recently filled or refills available) for medication(s) requested? No    Pharmacy Name: [facility]    Does the patient have halfway Plus and need 100-day supply? (This applies to ALL medications) Patient does not have SCP

## 2025-06-25 ENCOUNTER — RESULTS FOLLOW-UP (OUTPATIENT)
Dept: MEDICAL GROUP | Facility: MEDICAL CENTER | Age: 45
End: 2025-06-25

## 2025-07-07 ENCOUNTER — PHARMACY VISIT (OUTPATIENT)
Dept: PHARMACY | Facility: MEDICAL CENTER | Age: 45
End: 2025-07-07
Payer: COMMERCIAL

## 2025-07-07 PROCEDURE — RXMED WILLOW AMBULATORY MEDICATION CHARGE

## 2025-07-15 PROCEDURE — RXMED WILLOW AMBULATORY MEDICATION CHARGE

## 2025-07-18 ENCOUNTER — PHARMACY VISIT (OUTPATIENT)
Dept: PHARMACY | Facility: MEDICAL CENTER | Age: 45
End: 2025-07-18
Payer: COMMERCIAL

## 2025-07-28 PROCEDURE — RXMED WILLOW AMBULATORY MEDICATION CHARGE

## 2025-08-06 ENCOUNTER — PHARMACY VISIT (OUTPATIENT)
Dept: PHARMACY | Facility: MEDICAL CENTER | Age: 45
End: 2025-08-06
Payer: COMMERCIAL

## 2025-08-06 PROCEDURE — RXMED WILLOW AMBULATORY MEDICATION CHARGE

## 2025-08-11 DIAGNOSIS — M54.50 CHRONIC RIGHT-SIDED LOW BACK PAIN WITHOUT SCIATICA: ICD-10-CM

## 2025-08-11 DIAGNOSIS — G89.29 CHRONIC RIGHT-SIDED LOW BACK PAIN WITHOUT SCIATICA: ICD-10-CM

## 2025-08-11 PROCEDURE — RXMED WILLOW AMBULATORY MEDICATION CHARGE

## 2025-08-12 RX ORDER — IBUPROFEN 600 MG/1
TABLET, FILM COATED ORAL
Qty: 60 TABLET | Refills: 1 | Status: SHIPPED | OUTPATIENT
Start: 2025-08-12

## 2025-08-15 ENCOUNTER — PHARMACY VISIT (OUTPATIENT)
Dept: PHARMACY | Facility: MEDICAL CENTER | Age: 45
End: 2025-08-15
Payer: COMMERCIAL

## 2025-08-15 PROCEDURE — RXMED WILLOW AMBULATORY MEDICATION CHARGE
